# Patient Record
Sex: MALE | Race: WHITE | NOT HISPANIC OR LATINO
[De-identification: names, ages, dates, MRNs, and addresses within clinical notes are randomized per-mention and may not be internally consistent; named-entity substitution may affect disease eponyms.]

---

## 2017-04-28 ENCOUNTER — APPOINTMENT (OUTPATIENT)
Dept: CARDIOLOGY | Facility: CLINIC | Age: 67
End: 2017-04-28

## 2017-04-28 VITALS
SYSTOLIC BLOOD PRESSURE: 129 MMHG | HEART RATE: 55 BPM | WEIGHT: 165 LBS | OXYGEN SATURATION: 99 % | DIASTOLIC BLOOD PRESSURE: 72 MMHG | BODY MASS INDEX: 23.01 KG/M2

## 2017-04-28 DIAGNOSIS — J30.2 OTHER SEASONAL ALLERGIC RHINITIS: ICD-10-CM

## 2017-04-28 DIAGNOSIS — Z78.9 OTHER SPECIFIED HEALTH STATUS: ICD-10-CM

## 2017-05-08 ENCOUNTER — APPOINTMENT (OUTPATIENT)
Dept: CARDIOLOGY | Facility: CLINIC | Age: 67
End: 2017-05-08

## 2017-05-23 ENCOUNTER — APPOINTMENT (OUTPATIENT)
Dept: CARDIOLOGY | Facility: CLINIC | Age: 67
End: 2017-05-23

## 2018-02-05 ENCOUNTER — APPOINTMENT (OUTPATIENT)
Dept: CARDIOLOGY | Facility: CLINIC | Age: 68
End: 2018-02-05
Payer: COMMERCIAL

## 2018-02-05 ENCOUNTER — NON-APPOINTMENT (OUTPATIENT)
Age: 68
End: 2018-02-05

## 2018-02-05 VITALS
HEART RATE: 61 BPM | BODY MASS INDEX: 23.66 KG/M2 | WEIGHT: 169 LBS | SYSTOLIC BLOOD PRESSURE: 144 MMHG | OXYGEN SATURATION: 91 % | DIASTOLIC BLOOD PRESSURE: 76 MMHG | HEIGHT: 71 IN

## 2018-02-05 VITALS — SYSTOLIC BLOOD PRESSURE: 104 MMHG | DIASTOLIC BLOOD PRESSURE: 60 MMHG

## 2018-02-05 PROCEDURE — 36415 COLL VENOUS BLD VENIPUNCTURE: CPT

## 2018-02-05 PROCEDURE — 93000 ELECTROCARDIOGRAM COMPLETE: CPT

## 2018-02-05 PROCEDURE — 99214 OFFICE O/P EST MOD 30 MIN: CPT

## 2018-02-05 RX ORDER — MESALAMINE 4 G/60ML
4 ENEMA RECTAL
Qty: 1 | Refills: 0 | Status: DISCONTINUED | COMMUNITY
Start: 2017-04-28 | End: 2018-02-05

## 2018-02-07 LAB
25(OH)D3 SERPL-MCNC: 68.2 NG/ML
ALBUMIN SERPL ELPH-MCNC: 4.2 G/DL
ALP BLD-CCNC: 97 U/L
ALT SERPL-CCNC: 17 U/L
ANION GAP SERPL CALC-SCNC: 14 MMOL/L
AST SERPL-CCNC: 24 U/L
BASOPHILS # BLD AUTO: 0.03 K/UL
BASOPHILS NFR BLD AUTO: 0.6 %
BILIRUB SERPL-MCNC: 0.2 MG/DL
BUN SERPL-MCNC: 26 MG/DL
CALCIUM SERPL-MCNC: 9.4 MG/DL
CHLORIDE SERPL-SCNC: 103 MMOL/L
CHOLEST SERPL-MCNC: 172 MG/DL
CHOLEST/HDLC SERPL: 4 RATIO
CO2 SERPL-SCNC: 24 MMOL/L
CREAT SERPL-MCNC: 1.35 MG/DL
EOSINOPHIL # BLD AUTO: 0.16 K/UL
EOSINOPHIL NFR BLD AUTO: 3.1 %
GLUCOSE SERPL-MCNC: 59 MG/DL
HBA1C MFR BLD HPLC: 5.4 %
HCT VFR BLD CALC: 45.1 %
HDLC SERPL-MCNC: 43 MG/DL
HGB BLD-MCNC: 14.6 G/DL
IMM GRANULOCYTES NFR BLD AUTO: 0 %
LDLC SERPL CALC-MCNC: 87 MG/DL
LYMPHOCYTES # BLD AUTO: 1.66 K/UL
LYMPHOCYTES NFR BLD AUTO: 31.7 %
MAN DIFF?: NORMAL
MCHC RBC-ENTMCNC: 32.4 GM/DL
MCHC RBC-ENTMCNC: 33.6 PG
MCV RBC AUTO: 103.7 FL
MONOCYTES # BLD AUTO: 0.43 K/UL
MONOCYTES NFR BLD AUTO: 8.2 %
NEUTROPHILS # BLD AUTO: 2.95 K/UL
NEUTROPHILS NFR BLD AUTO: 56.4 %
PLATELET # BLD AUTO: 163 K/UL
POTASSIUM SERPL-SCNC: 5.7 MMOL/L
PROT SERPL-MCNC: 6.8 G/DL
RBC # BLD: 4.35 M/UL
RBC # FLD: 13.6 %
SODIUM SERPL-SCNC: 141 MMOL/L
TRIGL SERPL-MCNC: 210 MG/DL
TSH SERPL-ACNC: 2.34 UIU/ML
WBC # FLD AUTO: 5.23 K/UL

## 2018-02-22 LAB
ALBUMIN SERPL ELPH-MCNC: 4 G/DL
ALP BLD-CCNC: 79 U/L
ALT SERPL-CCNC: 20 U/L
ANION GAP SERPL CALC-SCNC: 11 MMOL/L
AST SERPL-CCNC: 25 U/L
BILIRUB SERPL-MCNC: 0.6 MG/DL
BUN SERPL-MCNC: 27 MG/DL
CALCIUM SERPL-MCNC: 9.5 MG/DL
CHLORIDE SERPL-SCNC: 107 MMOL/L
CO2 SERPL-SCNC: 24 MMOL/L
CREAT SERPL-MCNC: 1.46 MG/DL
GLUCOSE SERPL-MCNC: 66 MG/DL
POTASSIUM SERPL-SCNC: 4.7 MMOL/L
PROT SERPL-MCNC: 6.5 G/DL
SODIUM SERPL-SCNC: 142 MMOL/L

## 2018-03-08 ENCOUNTER — NON-APPOINTMENT (OUTPATIENT)
Age: 68
End: 2018-03-08

## 2018-03-08 ENCOUNTER — APPOINTMENT (OUTPATIENT)
Dept: CARDIOLOGY | Facility: CLINIC | Age: 68
End: 2018-03-08
Payer: COMMERCIAL

## 2018-03-08 VITALS
TEMPERATURE: 97.5 F | OXYGEN SATURATION: 99 % | SYSTOLIC BLOOD PRESSURE: 105 MMHG | HEIGHT: 71 IN | HEART RATE: 53 BPM | BODY MASS INDEX: 22.4 KG/M2 | WEIGHT: 160 LBS | DIASTOLIC BLOOD PRESSURE: 62 MMHG

## 2018-03-08 DIAGNOSIS — Z01.810 ENCOUNTER FOR PREPROCEDURAL CARDIOVASCULAR EXAMINATION: ICD-10-CM

## 2018-03-08 PROCEDURE — 93000 ELECTROCARDIOGRAM COMPLETE: CPT

## 2018-03-08 PROCEDURE — 36415 COLL VENOUS BLD VENIPUNCTURE: CPT

## 2018-03-08 PROCEDURE — 99214 OFFICE O/P EST MOD 30 MIN: CPT

## 2018-03-12 LAB
ALBUMIN SERPL ELPH-MCNC: 4.4 G/DL
ALP BLD-CCNC: 87 U/L
ALT SERPL-CCNC: 16 U/L
ANION GAP SERPL CALC-SCNC: 16 MMOL/L
APPEARANCE: CLEAR
AST SERPL-CCNC: 26 U/L
BASOPHILS # BLD AUTO: 0.02 K/UL
BASOPHILS NFR BLD AUTO: 0.4 %
BILIRUB SERPL-MCNC: 0.6 MG/DL
BILIRUBIN URINE: NEGATIVE
BLOOD URINE: NEGATIVE
BUN SERPL-MCNC: 22 MG/DL
CALCIUM SERPL-MCNC: 9.5 MG/DL
CHLORIDE SERPL-SCNC: 106 MMOL/L
CO2 SERPL-SCNC: 17 MMOL/L
COLOR: YELLOW
CREAT SERPL-MCNC: 1.23 MG/DL
EOSINOPHIL # BLD AUTO: 0.08 K/UL
EOSINOPHIL NFR BLD AUTO: 1.6 %
GLUCOSE QUALITATIVE U: NEGATIVE MG/DL
GLUCOSE SERPL-MCNC: 98 MG/DL
HCT VFR BLD CALC: 43.7 %
HGB BLD-MCNC: 15.2 G/DL
IMM GRANULOCYTES NFR BLD AUTO: 0 %
KETONES URINE: NEGATIVE
LEUKOCYTE ESTERASE URINE: NEGATIVE
LYMPHOCYTES # BLD AUTO: 1.23 K/UL
LYMPHOCYTES NFR BLD AUTO: 24.2 %
MAN DIFF?: NORMAL
MCHC RBC-ENTMCNC: 34.4 PG
MCHC RBC-ENTMCNC: 34.8 GM/DL
MCV RBC AUTO: 98.9 FL
MONOCYTES # BLD AUTO: 0.43 K/UL
MONOCYTES NFR BLD AUTO: 8.4 %
NEUTROPHILS # BLD AUTO: 3.33 K/UL
NEUTROPHILS NFR BLD AUTO: 65.4 %
NITRITE URINE: NEGATIVE
PH URINE: 5
PLATELET # BLD AUTO: 187 K/UL
POTASSIUM SERPL-SCNC: 4.7 MMOL/L
PROT SERPL-MCNC: 6.8 G/DL
PROTEIN URINE: NEGATIVE MG/DL
PSA SERPL-MCNC: 5.98 NG/ML
RBC # BLD: 4.42 M/UL
RBC # FLD: 13.4 %
SODIUM SERPL-SCNC: 139 MMOL/L
SPECIFIC GRAVITY URINE: 1.01
UROBILINOGEN URINE: NEGATIVE MG/DL
WBC # FLD AUTO: 5.09 K/UL

## 2018-04-03 ENCOUNTER — TRANSCRIPTION ENCOUNTER (OUTPATIENT)
Age: 68
End: 2018-04-03

## 2018-04-17 ENCOUNTER — APPOINTMENT (OUTPATIENT)
Dept: UROLOGY | Facility: CLINIC | Age: 68
End: 2018-04-17
Payer: COMMERCIAL

## 2018-04-17 ENCOUNTER — RECORD ABSTRACTING (OUTPATIENT)
Age: 68
End: 2018-04-17

## 2018-04-17 VITALS
TEMPERATURE: 97.9 F | HEART RATE: 52 BPM | SYSTOLIC BLOOD PRESSURE: 114 MMHG | RESPIRATION RATE: 17 BRPM | BODY MASS INDEX: 22.4 KG/M2 | WEIGHT: 160 LBS | DIASTOLIC BLOOD PRESSURE: 73 MMHG | HEIGHT: 71 IN

## 2018-04-17 DIAGNOSIS — Z80.42 FAMILY HISTORY OF MALIGNANT NEOPLASM OF PROSTATE: ICD-10-CM

## 2018-04-17 PROCEDURE — 99204 OFFICE O/P NEW MOD 45 MIN: CPT

## 2018-04-25 ENCOUNTER — RESULT REVIEW (OUTPATIENT)
Age: 68
End: 2018-04-25

## 2018-05-02 ENCOUNTER — OUTPATIENT (OUTPATIENT)
Dept: OUTPATIENT SERVICES | Facility: HOSPITAL | Age: 68
LOS: 1 days | End: 2018-05-02

## 2018-05-02 VITALS
RESPIRATION RATE: 16 BRPM | DIASTOLIC BLOOD PRESSURE: 70 MMHG | TEMPERATURE: 97 F | HEIGHT: 70.5 IN | SYSTOLIC BLOOD PRESSURE: 114 MMHG | WEIGHT: 164.02 LBS | HEART RATE: 56 BPM

## 2018-05-02 DIAGNOSIS — E03.9 HYPOTHYROIDISM, UNSPECIFIED: ICD-10-CM

## 2018-05-02 DIAGNOSIS — C61 MALIGNANT NEOPLASM OF PROSTATE: ICD-10-CM

## 2018-05-02 DIAGNOSIS — Z01.818 ENCOUNTER FOR OTHER PREPROCEDURAL EXAMINATION: ICD-10-CM

## 2018-05-02 DIAGNOSIS — Z90.89 ACQUIRED ABSENCE OF OTHER ORGANS: Chronic | ICD-10-CM

## 2018-05-02 LAB
ALBUMIN SERPL ELPH-MCNC: 4 G/DL — SIGNIFICANT CHANGE UP (ref 3.3–5)
ALP SERPL-CCNC: 83 U/L — SIGNIFICANT CHANGE UP (ref 40–120)
ALT FLD-CCNC: 20 U/L — SIGNIFICANT CHANGE UP (ref 4–41)
APPEARANCE UR: CLEAR — SIGNIFICANT CHANGE UP
AST SERPL-CCNC: 22 U/L — SIGNIFICANT CHANGE UP (ref 4–40)
BILIRUB SERPL-MCNC: 0.6 MG/DL — SIGNIFICANT CHANGE UP (ref 0.2–1.2)
BILIRUB UR-MCNC: NEGATIVE — SIGNIFICANT CHANGE UP
BLD GP AB SCN SERPL QL: NEGATIVE — SIGNIFICANT CHANGE UP
BLOOD UR QL VISUAL: NEGATIVE — SIGNIFICANT CHANGE UP
BUN SERPL-MCNC: 21 MG/DL — SIGNIFICANT CHANGE UP (ref 7–23)
CALCIUM SERPL-MCNC: 8.9 MG/DL — SIGNIFICANT CHANGE UP (ref 8.4–10.5)
CHLORIDE SERPL-SCNC: 104 MMOL/L — SIGNIFICANT CHANGE UP (ref 98–107)
CO2 SERPL-SCNC: 25 MMOL/L — SIGNIFICANT CHANGE UP (ref 22–31)
COLOR SPEC: YELLOW — SIGNIFICANT CHANGE UP
CREAT SERPL-MCNC: 1.27 MG/DL — SIGNIFICANT CHANGE UP (ref 0.5–1.3)
GLUCOSE SERPL-MCNC: 67 MG/DL — LOW (ref 70–99)
GLUCOSE UR-MCNC: NEGATIVE — SIGNIFICANT CHANGE UP
HCT VFR BLD CALC: 46.5 % — SIGNIFICANT CHANGE UP (ref 39–50)
HGB BLD-MCNC: 15.6 G/DL — SIGNIFICANT CHANGE UP (ref 13–17)
KETONES UR-MCNC: NEGATIVE — SIGNIFICANT CHANGE UP
LEUKOCYTE ESTERASE UR-ACNC: NEGATIVE — SIGNIFICANT CHANGE UP
MCHC RBC-ENTMCNC: 33.5 % — SIGNIFICANT CHANGE UP (ref 32–36)
MCHC RBC-ENTMCNC: 33.8 PG — SIGNIFICANT CHANGE UP (ref 27–34)
MCV RBC AUTO: 100.9 FL — HIGH (ref 80–100)
NITRITE UR-MCNC: NEGATIVE — SIGNIFICANT CHANGE UP
NRBC # FLD: 0 — SIGNIFICANT CHANGE UP
PH UR: 7 — SIGNIFICANT CHANGE UP (ref 4.6–8)
PLATELET # BLD AUTO: 202 K/UL — SIGNIFICANT CHANGE UP (ref 150–400)
PMV BLD: 11.7 FL — SIGNIFICANT CHANGE UP (ref 7–13)
POTASSIUM SERPL-MCNC: 4.4 MMOL/L — SIGNIFICANT CHANGE UP (ref 3.5–5.3)
POTASSIUM SERPL-SCNC: 4.4 MMOL/L — SIGNIFICANT CHANGE UP (ref 3.5–5.3)
PROT SERPL-MCNC: 6.8 G/DL — SIGNIFICANT CHANGE UP (ref 6–8.3)
PROT UR-MCNC: 20 MG/DL — SIGNIFICANT CHANGE UP
RBC # BLD: 4.61 M/UL — SIGNIFICANT CHANGE UP (ref 4.2–5.8)
RBC # FLD: 12.4 % — SIGNIFICANT CHANGE UP (ref 10.3–14.5)
RBC CASTS # UR COMP ASSIST: SIGNIFICANT CHANGE UP (ref 0–?)
RH IG SCN BLD-IMP: POSITIVE — SIGNIFICANT CHANGE UP
SODIUM SERPL-SCNC: 140 MMOL/L — SIGNIFICANT CHANGE UP (ref 135–145)
SP GR SPEC: 1.02 — SIGNIFICANT CHANGE UP (ref 1–1.04)
UROBILINOGEN FLD QL: NORMAL MG/DL — SIGNIFICANT CHANGE UP
WBC # BLD: 4.28 K/UL — SIGNIFICANT CHANGE UP (ref 3.8–10.5)
WBC # FLD AUTO: 4.28 K/UL — SIGNIFICANT CHANGE UP (ref 3.8–10.5)
WBC UR QL: SIGNIFICANT CHANGE UP (ref 0–?)

## 2018-05-02 RX ORDER — SODIUM CHLORIDE 9 MG/ML
1000 INJECTION, SOLUTION INTRAVENOUS
Qty: 0 | Refills: 0 | Status: DISCONTINUED | OUTPATIENT
Start: 2018-05-14 | End: 2018-05-14

## 2018-05-02 NOTE — H&P PST ADULT - FAMILY HISTORY
Aunt  Still living? No  Family history of lung cancer, Age at diagnosis: Age Unknown  Family history of multiple myeloma, Age at diagnosis: Age Unknown  Family history of diabetes mellitus, Age at diagnosis: Age Unknown     Father  Still living? No  Family history of CHF (congestive heart failure), Age at diagnosis: Age Unknown

## 2018-05-02 NOTE — H&P PST ADULT - PMH
Hyperlipidemia  been on medication for >5 years  Hypothyroidism    IBS (irritable bowel syndrome)    Malignant neoplasm of prostate

## 2018-05-02 NOTE — H&P PST ADULT - NEGATIVE NEUROLOGICAL SYMPTOMS
no weakness/no generalized seizures/no vertigo/no loss of sensation/no transient paralysis/no paresthesias/no headache/no difficulty walking

## 2018-05-02 NOTE — H&P PST ADULT - MUSCULOSKELETAL
details… detailed exam normal strength/no joint warmth/no calf tenderness/no joint swelling/no joint erythema/ROM intact

## 2018-05-02 NOTE — H&P PST ADULT - NEGATIVE CARDIOVASCULAR SYMPTOMS
no orthopnea/no paroxysmal nocturnal dyspnea/no peripheral edema/no palpitations/no claudication/no chest pain/no dyspnea on exertion

## 2018-05-02 NOTE — H&P PST ADULT - NEGATIVE ENMT SYMPTOMS
no nasal congestion/no hearing difficulty/no vertigo/no sinus symptoms/no tinnitus/no ear pain/no dysphagia

## 2018-05-02 NOTE — H&P PST ADULT - HISTORY OF PRESENT ILLNESS
69 yo male with PMH hypothyroidism presents to pre surgical testing.  Pt reports his PSA was elevated last year and has had 2 prostate biopsies, last done 3/2018, positive for malignancy.  Pt reports MRI done 3/2018.  Pt is scheduled for robotic radical prostatectomy on 5/14/18.

## 2018-05-02 NOTE — H&P PST ADULT - NEGATIVE MUSCULOSKELETAL SYMPTOMS
no muscle cramps/no neck pain/no stiffness/no arm pain L/no leg pain L/no joint swelling/no myalgia/no muscle weakness/no leg pain R/no arthralgia/no back pain

## 2018-05-02 NOTE — H&P PST ADULT - PROBLEM SELECTOR PLAN 1
Pt is scheduled for robotic radical prostatectomy on 5/14/18.   Pre op instructions including chlorhexidine wash given and pt able to verbalize understanding.

## 2018-05-02 NOTE — H&P PST ADULT - LYMPHATIC
supraclavicular L/supraclavicular R/anterior cervical R/posterior cervical L/anterior cervical L/posterior cervical R

## 2018-05-02 NOTE — H&P PST ADULT - NSANTHOSAYNRD_GEN_A_CORE
No. NILDA screening performed.  STOP BANG Legend: 0-2 = LOW Risk; 3-4 = INTERMEDIATE Risk; 5-8 = HIGH Risk

## 2018-05-03 LAB — SPECIMEN SOURCE: SIGNIFICANT CHANGE UP

## 2018-05-04 LAB — BACTERIA UR CULT: SIGNIFICANT CHANGE UP

## 2018-05-13 ENCOUNTER — TRANSCRIPTION ENCOUNTER (OUTPATIENT)
Age: 68
End: 2018-05-13

## 2018-05-14 ENCOUNTER — INPATIENT (INPATIENT)
Facility: HOSPITAL | Age: 68
LOS: 1 days | Discharge: HOME | End: 2018-05-16
Attending: UROLOGY | Admitting: UROLOGY
Payer: COMMERCIAL

## 2018-05-14 ENCOUNTER — APPOINTMENT (OUTPATIENT)
Dept: UROLOGY | Facility: HOSPITAL | Age: 68
End: 2018-05-14

## 2018-05-14 ENCOUNTER — RESULT REVIEW (OUTPATIENT)
Age: 68
End: 2018-05-14

## 2018-05-14 VITALS
OXYGEN SATURATION: 100 % | SYSTOLIC BLOOD PRESSURE: 110 MMHG | HEART RATE: 50 BPM | RESPIRATION RATE: 15 BRPM | DIASTOLIC BLOOD PRESSURE: 67 MMHG | HEIGHT: 70.5 IN | WEIGHT: 164.02 LBS | TEMPERATURE: 97 F

## 2018-05-14 DIAGNOSIS — C61 MALIGNANT NEOPLASM OF PROSTATE: ICD-10-CM

## 2018-05-14 DIAGNOSIS — Z29.9 ENCOUNTER FOR PROPHYLACTIC MEASURES, UNSPECIFIED: ICD-10-CM

## 2018-05-14 DIAGNOSIS — Z90.89 ACQUIRED ABSENCE OF OTHER ORGANS: Chronic | ICD-10-CM

## 2018-05-14 LAB — RH IG SCN BLD-IMP: POSITIVE — SIGNIFICANT CHANGE UP

## 2018-05-14 PROCEDURE — 99223 1ST HOSP IP/OBS HIGH 75: CPT

## 2018-05-14 PROCEDURE — 38571 LAPAROSCOPY LYMPHADENECTOMY: CPT

## 2018-05-14 PROCEDURE — 55866 LAPS SURG PRST8ECT RPBIC RAD: CPT

## 2018-05-14 PROCEDURE — 88307 TISSUE EXAM BY PATHOLOGIST: CPT | Mod: 26

## 2018-05-14 PROCEDURE — 88309 TISSUE EXAM BY PATHOLOGIST: CPT | Mod: 26

## 2018-05-14 RX ORDER — OXYCODONE AND ACETAMINOPHEN 5; 325 MG/1; MG/1
1 TABLET ORAL EVERY 4 HOURS
Qty: 0 | Refills: 0 | Status: DISCONTINUED | OUTPATIENT
Start: 2018-05-14 | End: 2018-05-14

## 2018-05-14 RX ORDER — FLUTICASONE PROPIONATE 50 MCG
1 SPRAY, SUSPENSION NASAL
Qty: 0 | Refills: 0 | Status: DISCONTINUED | OUTPATIENT
Start: 2018-05-14 | End: 2018-05-16

## 2018-05-14 RX ORDER — ONDANSETRON 8 MG/1
4 TABLET, FILM COATED ORAL ONCE
Qty: 0 | Refills: 0 | Status: COMPLETED | OUTPATIENT
Start: 2018-05-14 | End: 2018-05-14

## 2018-05-14 RX ORDER — MORPHINE SULFATE 50 MG/1
4 CAPSULE, EXTENDED RELEASE ORAL
Qty: 0 | Refills: 0 | Status: DISCONTINUED | OUTPATIENT
Start: 2018-05-14 | End: 2018-05-14

## 2018-05-14 RX ORDER — HYDROMORPHONE HYDROCHLORIDE 2 MG/ML
0.5 INJECTION INTRAMUSCULAR; INTRAVENOUS; SUBCUTANEOUS ONCE
Qty: 0 | Refills: 0 | Status: DISCONTINUED | OUTPATIENT
Start: 2018-05-14 | End: 2018-05-14

## 2018-05-14 RX ORDER — SODIUM CHLORIDE 9 MG/ML
1000 INJECTION, SOLUTION INTRAVENOUS
Qty: 0 | Refills: 0 | Status: DISCONTINUED | OUTPATIENT
Start: 2018-05-14 | End: 2018-05-15

## 2018-05-14 RX ORDER — TETRAHYDROZOLINE/POLYETHYL GLY 0.05 %-1 %
1 DROPS OPHTHALMIC (EYE)
Qty: 0 | Refills: 0 | Status: DISCONTINUED | OUTPATIENT
Start: 2018-05-14 | End: 2018-05-16

## 2018-05-14 RX ORDER — SODIUM CHLORIDE 9 MG/ML
500 INJECTION INTRAMUSCULAR; INTRAVENOUS; SUBCUTANEOUS ONCE
Qty: 0 | Refills: 0 | Status: COMPLETED | OUTPATIENT
Start: 2018-05-14 | End: 2018-05-14

## 2018-05-14 RX ORDER — ACETAMINOPHEN 500 MG
650 TABLET ORAL EVERY 6 HOURS
Qty: 0 | Refills: 0 | Status: DISCONTINUED | OUTPATIENT
Start: 2018-05-14 | End: 2018-05-16

## 2018-05-14 RX ORDER — OXYCODONE HYDROCHLORIDE 5 MG/1
10 TABLET ORAL EVERY 4 HOURS
Qty: 0 | Refills: 0 | Status: DISCONTINUED | OUTPATIENT
Start: 2018-05-14 | End: 2018-05-16

## 2018-05-14 RX ORDER — OXYCODONE AND ACETAMINOPHEN 5; 325 MG/1; MG/1
2 TABLET ORAL EVERY 4 HOURS
Qty: 0 | Refills: 0 | Status: DISCONTINUED | OUTPATIENT
Start: 2018-05-14 | End: 2018-05-14

## 2018-05-14 RX ORDER — SENNA PLUS 8.6 MG/1
2 TABLET ORAL AT BEDTIME
Qty: 0 | Refills: 0 | Status: DISCONTINUED | OUTPATIENT
Start: 2018-05-14 | End: 2018-05-16

## 2018-05-14 RX ORDER — ONDANSETRON 8 MG/1
4 TABLET, FILM COATED ORAL EVERY 6 HOURS
Qty: 0 | Refills: 0 | Status: DISCONTINUED | OUTPATIENT
Start: 2018-05-14 | End: 2018-05-16

## 2018-05-14 RX ORDER — HEPARIN SODIUM 5000 [USP'U]/ML
5000 INJECTION INTRAVENOUS; SUBCUTANEOUS ONCE
Qty: 0 | Refills: 0 | Status: COMPLETED | OUTPATIENT
Start: 2018-05-14 | End: 2018-05-14

## 2018-05-14 RX ORDER — DOCUSATE SODIUM 100 MG
100 CAPSULE ORAL THREE TIMES A DAY
Qty: 0 | Refills: 0 | Status: DISCONTINUED | OUTPATIENT
Start: 2018-05-14 | End: 2018-05-16

## 2018-05-14 RX ORDER — OXYBUTYNIN CHLORIDE 5 MG
5 TABLET ORAL THREE TIMES A DAY
Qty: 0 | Refills: 0 | Status: DISCONTINUED | OUTPATIENT
Start: 2018-05-14 | End: 2018-05-16

## 2018-05-14 RX ORDER — LIDOCAINE 4 G/100G
1 CREAM TOPICAL
Qty: 0 | Refills: 0 | Status: DISCONTINUED | OUTPATIENT
Start: 2018-05-14 | End: 2018-05-16

## 2018-05-14 RX ORDER — THYROID 120 MG
45 TABLET ORAL DAILY
Qty: 0 | Refills: 0 | Status: DISCONTINUED | OUTPATIENT
Start: 2018-05-14 | End: 2018-05-16

## 2018-05-14 RX ORDER — METOCLOPRAMIDE HCL 10 MG
10 TABLET ORAL ONCE
Qty: 0 | Refills: 0 | Status: COMPLETED | OUTPATIENT
Start: 2018-05-14 | End: 2018-05-14

## 2018-05-14 RX ORDER — OXYCODONE HYDROCHLORIDE 5 MG/1
5 TABLET ORAL EVERY 4 HOURS
Qty: 0 | Refills: 0 | Status: DISCONTINUED | OUTPATIENT
Start: 2018-05-14 | End: 2018-05-16

## 2018-05-14 RX ORDER — BENZOCAINE AND MENTHOL 5; 1 G/100ML; G/100ML
1 LIQUID ORAL
Qty: 0 | Refills: 0 | Status: DISCONTINUED | OUTPATIENT
Start: 2018-05-14 | End: 2018-05-16

## 2018-05-14 RX ORDER — HEPARIN SODIUM 5000 [USP'U]/ML
5000 INJECTION INTRAVENOUS; SUBCUTANEOUS EVERY 12 HOURS
Qty: 0 | Refills: 0 | Status: DISCONTINUED | OUTPATIENT
Start: 2018-05-14 | End: 2018-05-16

## 2018-05-14 RX ADMIN — SODIUM CHLORIDE 125 MILLILITER(S): 9 INJECTION, SOLUTION INTRAVENOUS at 12:26

## 2018-05-14 RX ADMIN — HYDROMORPHONE HYDROCHLORIDE 0.5 MILLIGRAM(S): 2 INJECTION INTRAMUSCULAR; INTRAVENOUS; SUBCUTANEOUS at 13:15

## 2018-05-14 RX ADMIN — MORPHINE SULFATE 4 MILLIGRAM(S): 50 CAPSULE, EXTENDED RELEASE ORAL at 13:05

## 2018-05-14 RX ADMIN — SODIUM CHLORIDE 3000 MILLILITER(S): 9 INJECTION INTRAMUSCULAR; INTRAVENOUS; SUBCUTANEOUS at 13:15

## 2018-05-14 RX ADMIN — SODIUM CHLORIDE 30 MILLILITER(S): 9 INJECTION, SOLUTION INTRAVENOUS at 07:41

## 2018-05-14 RX ADMIN — HEPARIN SODIUM 5000 UNIT(S): 5000 INJECTION INTRAVENOUS; SUBCUTANEOUS at 07:41

## 2018-05-14 RX ADMIN — SENNA PLUS 2 TABLET(S): 8.6 TABLET ORAL at 21:24

## 2018-05-14 RX ADMIN — OXYCODONE AND ACETAMINOPHEN 1 TABLET(S): 5; 325 TABLET ORAL at 15:03

## 2018-05-14 RX ADMIN — OXYCODONE AND ACETAMINOPHEN 1 TABLET(S): 5; 325 TABLET ORAL at 15:45

## 2018-05-14 RX ADMIN — OXYCODONE HYDROCHLORIDE 10 MILLIGRAM(S): 5 TABLET ORAL at 18:30

## 2018-05-14 RX ADMIN — BENZOCAINE AND MENTHOL 1 LOZENGE: 5; 1 LIQUID ORAL at 17:26

## 2018-05-14 RX ADMIN — MORPHINE SULFATE 4 MILLIGRAM(S): 50 CAPSULE, EXTENDED RELEASE ORAL at 12:23

## 2018-05-14 RX ADMIN — OXYCODONE HYDROCHLORIDE 10 MILLIGRAM(S): 5 TABLET ORAL at 17:33

## 2018-05-14 RX ADMIN — MORPHINE SULFATE 4 MILLIGRAM(S): 50 CAPSULE, EXTENDED RELEASE ORAL at 12:46

## 2018-05-14 RX ADMIN — HEPARIN SODIUM 5000 UNIT(S): 5000 INJECTION INTRAVENOUS; SUBCUTANEOUS at 17:27

## 2018-05-14 RX ADMIN — Medication 10 MILLIGRAM(S): at 19:51

## 2018-05-14 RX ADMIN — ONDANSETRON 4 MILLIGRAM(S): 8 TABLET, FILM COATED ORAL at 11:53

## 2018-05-14 RX ADMIN — HYDROMORPHONE HYDROCHLORIDE 0.5 MILLIGRAM(S): 2 INJECTION INTRAMUSCULAR; INTRAVENOUS; SUBCUTANEOUS at 13:30

## 2018-05-14 RX ADMIN — Medication 100 MILLIGRAM(S): at 21:23

## 2018-05-14 RX ADMIN — ONDANSETRON 4 MILLIGRAM(S): 8 TABLET, FILM COATED ORAL at 16:27

## 2018-05-14 RX ADMIN — MORPHINE SULFATE 4 MILLIGRAM(S): 50 CAPSULE, EXTENDED RELEASE ORAL at 12:49

## 2018-05-14 NOTE — ASU PREOP CHECKLIST - ADVANCE DIRECTIVE ADDRESSED/READDRESSED
Endocrinology Clinic Progress Note  PCP: Daysi Nguyen M.D.    CC:  Hypercalcemia    HPI:  Michael Mixon is a 59 y.o. old patient who comes in today for routine follow up. Recent lab work is consistent with diagnoses of primary hyperparathyroidism. His serum calcium level is more than 1 mg/dL above upper normal. He is taking small dose of supplemental calcium and vitamin D. No history of kidney stones or fragility fractures.    ROS:  Constitutional: No unintentional weight loss    Past Medical History:  Patient Active Problem List    Diagnosis Date Noted   • Preventative health care 08/19/2016   • Need for prophylactic vaccination with combined vaccine 08/19/2016   • Hypertriglyceridemia 03/28/2014   • History of prostate cancer 03/28/2014   • GERD (gastroesophageal reflux disease) 03/28/2014   • Vitamin D deficiency 03/28/2014       Medications:    Current outpatient prescriptions:   •  tamsulosin (FLOMAX) 0.4 MG capsule, Take 0.4 mg by mouth ONE-HALF HOUR AFTER BREAKFAST., Disp: , Rfl:   •  omeprazole (PRILOSEC) 20 MG delayed-release capsule, Take 1 Cap by mouth every day. 1-2 daily prn acid reflux., Disp: 90 Cap, Rfl: 3  •  NON SPECIFIED, Vit D 50,000 IU # 8  Sig: Take once week x 8 weeks., Disp: 8 Tab, Rfl: 0  •  clobetasol (TEMOVATE) 0.05 % CREA, Apply  to affected area(s) 2 times a day. Apply bid to effected area., Disp: 45 g, Rfl: 1  •  gemfibrozil (LOPID) 600 MG TABS, Take 1 Tab by mouth 2 times a day. 1/2 ac., Disp: 180 Each, Rfl: 3  •  mupirocin (BACTROBAN) 2 % OINT, Apply to effected area bid prn., Disp: 1 Tube, Rfl: 2  •  fexofenadine-pseudoephedrine (ALLEGRA-D)  MG per tablet, Take 1 Tab by mouth. One q 12 hours prn ear or nasal congestion., Disp: 60 Tab, Rfl: 3    Labs:  Results for MICHAEL MIXON (MRN [id number]) as of 3/10/2017 08:29   Ref. Range 8/19/2016 12:02 9/2/2016 08:04 2/25/2017 11:46 2/25/2017 11:52 2/27/2017 15:54   Sodium Latest Ref Range: 135-145 mmol/L 139  138      Potassium Latest Ref Range: 3.6-5.5 mmol/L 4.0  3.9     Chloride Latest Ref Range:  mmol/L 106  102     Co2 Latest Ref Range: 20-33 mmol/L 26  31     Anion Gap Latest Ref Range: 0.0-11.9  7.0  5.0     Glucose Latest Ref Range: 65-99 mg/dL 87  93     Bun Latest Ref Range: 8-22 mg/dL 15  15     Creatinine Latest Ref Range: 0.50-1.40 mg/dL 0.90  1.06     GFR If  Latest Ref Range: >60 mL/min/1.73 m 2 >60  >60     GFR If Non  Latest Ref Range: >60 mL/min/1.73 m 2 >60  >60     Calcium Latest Ref Range: 8.5-10.5 mg/dL 11.1 (H) 11.2 (H) 11.6 (H)     AST(SGOT) Latest Ref Range: 12-45 U/L 22  16     ALT(SGPT) Latest Ref Range: 2-50 U/L 30  27     Alkaline Phosphatase Latest Ref Range: 30-99 U/L 86  91     Total Bilirubin Latest Ref Range: 0.1-1.5 mg/dL 0.9  0.6     Albumin Latest Ref Range: 3.2-4.9 g/dL 4.5  4.6     Total Protein Latest Ref Range: 6.0-8.2 g/dL 7.2  7.8     Globulin Latest Ref Range: 1.9-3.5 g/dL 2.7  3.2     A-G Ratio Latest Units: g/dL 1.7  1.4     Phosphorus Latest Ref Range: 2.5-4.5 mg/dL   2.9     Ionized Calcium Latest Ref Range: 1.1-1.3 mmol/L     1.5 (H)   Glycohemoglobin Latest Ref Range: 0.0-5.6 % 5.1       Estim. Avg Glu Latest Units: mg/dL 100       Cholesterol,Tot Latest Ref Range: 100-199 mg/dL 157       Triglycerides Latest Ref Range: 0-149 mg/dL 176 (H)       HDL Latest Ref Range: >=40 mg/dL 31 (A)       LDL Latest Ref Range: <100 mg/dL 91       Creatinine, Random Urine Latest Ref Range: 800-2100 mg/d    1512    Creatinine, Urine Latest Units: mg/dL    88.40    Total Volume, Urine Latest Units: mL    2100    Creatinine 24 Hr Urine Latest Ref Range: 9012-8851 mg/24 Hr    1856    Total Volume Latest Units: mL    2100    Calcium Random Urine Latest Units: mg/dL    14.1    Calcium Urine Latest Units: mg/d    296    Calcium Creat Ratio   13156 Latest Ref Range:  mg/g    196    Creatinine Urine Latest Units: mg/dL    72      Physical Examination:  Vital  "signs: /82 mmHg  Pulse 80  Ht 1.746 m (5' 8.75\")  Wt 79.017 kg (174 lb 3.2 oz)  BMI 25.92 kg/m2  SpO2 96%  General: No apparent distress, cooperative  Eyes: No scleral icterus, no discharge  Resp: Normal effort, clear to auscultation bilaterally  CVS: Regular rate and rhythm, S1 S2 normal, no murmur  Extremities: No lower extremity edema  Psych: Alert and oriented, normal mood and affect    Assessment and Plan:    1. Hypercalcemia due to Primary hyperparathyroidism (CMS-HCC)  · We discussed that serum calcium 1 mg/dL above upper normal is an indication for parathyroidectomy, he agrees with this plan  · We will obtain parathyroid scan  · We will refer to surgery for parathyroid resection  · Advised to resume 800-1000 mg calcium per day and 800 international units vitamin D per day  · Advised to stay well hydrated  - NM-PARATHYROID (SESTAMIBI) SCAN; Future  - REFERRAL TO GENERAL SURGERY    Return in about 6 months (around 9/10/2017).    Thank you for allowing me to participate in the care of this patient.    Carlos Slater M.D.  03/10/2017    CC:   Daysi Nguyen M.D.    This note was created using voice recognition software (Dragon). The accuracy of the dictation is limited by the abilities of the software. I have reviewed the note prior to signing, however some errors in grammar and context are still possible. If you have any questions related to this note please do not hesitate to contact our office.     " done

## 2018-05-14 NOTE — CONSULT NOTE ADULT - PROBLEM SELECTOR RECOMMENDATION 9
S/P OR.  Management as per   DVT prophylaxis  Pain control  Incentive spirometer  OOB and ambulation  Bowel regimen

## 2018-05-14 NOTE — PROGRESS NOTE ADULT - SUBJECTIVE AND OBJECTIVE BOX
Post op check    This 67 yo M is s/p RALP  PMH: IBS; chol; hypothyroid    Pt is awake and alert  Has no c/o    Afeb 124/64 64 98%RA    Abd- soft; appropriately tender             wounds C&D  Way 140 clear  NAWAF 15

## 2018-05-14 NOTE — CONSULT NOTE ADULT - ASSESSMENT
68 y.o. M with h/o hypothyroidism, IBS, HLD prostate ca, admitted for Robotic Radical prostatectomy. S/P OR, doing well

## 2018-05-14 NOTE — BRIEF OPERATIVE NOTE - PROCEDURE
<<-----Click on this checkbox to enter Procedure Robot-assisted laparoscopic radical prostatectomy  05/14/2018    Active  SGURRAM

## 2018-05-14 NOTE — CONSULT NOTE ADULT - SUBJECTIVE AND OBJECTIVE BOX
Patient is a 68y old  Male who presents with a chief complaint of "prostate removal" (02 May 2018 08:38)      HPI:  69 yo male with PMH hypothyroidism presents for Robotic radical prostatectomy.  S/P OR. Only c/o mild pain of suprapubic area and sore throat. Denies any CP or SOB    History limited due to: [ ] Dementia  [ ] Delirium  [ ] Condition    Pain Symptoms if applicable:             	                      	   mild          Pain:	                    	    1-3	     Location:	Abd  Modifying factors:	  Associated symptoms:	None    Function: [x] Independent  [ ] Assistance  [ ] Total care  [ ] Non-ambulatory    Allergies    Allergy Status Unknown    Intolerances    Gluten (Stomach Upset)      HOME MEDICATIONS: [ x] Reviewed    MEDICATIONS  (STANDING):  docusate sodium 100 milliGRAM(s) Oral three times a day  heparin  Injectable 5000 Unit(s) SubCutaneous every 12 hours  lactated ringers. 1000 milliLiter(s) (125 mL/Hr) IV Continuous <Continuous>  senna 2 Tablet(s) Oral at bedtime  thyroid 45 milliGRAM(s) Oral daily    MEDICATIONS  (PRN):  benzocaine 15 mG/menthol 3.6 mG Lozenge 1 Lozenge Oral four times a day PRN Sore Throat  fluticasone propionate 50 MICROgram(s)/spray Nasal Spray 1 Spray(s) Both Nostrils <User Schedule> PRN rhinorrhea  lidocaine 5% Ointment 1 Application(s) Topical every 3 hours PRN urethral pain  ondansetron Injectable 4 milliGRAM(s) IV Push every 6 hours PRN Nausea and/or Vomiting  oxybutynin 5 milliGRAM(s) Oral three times a day PRN Bladder spasms  oxyCODONE    5 mG/acetaminophen 325 mG 1 Tablet(s) Oral every 4 hours PRN Mild Pain (1 - 3)  oxyCODONE    5 mG/acetaminophen 325 mG 2 Tablet(s) Oral every 4 hours PRN Moderate Pain (4 - 6)  tetrahydrazoline Solution 1 Drop(s) Both EYES four times a day PRN dry eyes      PAST MEDICAL & SURGICAL HISTORY:  Hyperlipidemia: been on medication for &gt;5 years  Malignant neoplasm of prostate  IBS (irritable bowel syndrome)  Hypothyroidism  S/P tonsillectomy: age 5  [x ] Reviewed     SOCIAL HISTORY:  Residence: [ ] care home  [ ] SNF  [x ] Community  [ ] Substance abuse: denies  [ ] Tobacco: denies  [ ] Alcohol use: denies    FAMILY HISTORY:  Family history of diabetes mellitus (Aunt)  Family history of CHF (congestive heart failure) (Father)  Family history of multiple myeloma (Aunt)  Family history of lung cancer (Aunt)  [ ] No pertinent family history in first degree relatives     REVIEW OF SYSTEMS:    CONSTITUTIONAL: No fever, weight loss, or fatigue  EYES: No eye pain, visual disturbances, or discharge  ENMT:  No difficulty hearing, tinnitus, vertigo; No sinus or throat pain  NECK: No pain or stiffness  BREASTS: No pain, masses, or nipple discharge  RESPIRATORY: No cough, wheezing, chills or hemoptysis; No shortness of breath  CARDIOVASCULAR: No chest pain, palpitations, dizziness, or leg swelling  GASTROINTESTINAL: (+) abdominal  pain. No nausea, vomiting, or hematemesis.  GENITOURINARY: No dysuria, frequency, hematuria, or incontinence, (+) rosas  NEUROLOGICAL: No headaches, memory loss, loss of strength, numbness, or tremors  SKIN: No itching, burning, rashes, or lesions   LYMPH NODES: No enlarged glands  ENDOCRINE: No heat or cold intolerance; No hair loss  MUSCULOSKELETAL: No muscle or back pain  PSYCHIATRIC: No depression, anxiety, mood swings, or difficulty sleeping  HEME/LYMPH: No easy bruising, or bleeding gums  ALLERGY AND IMMUNOLOGIC: No hives or eczema    [  ] All other ROS negative  [  ] Unable to obtain due to poor mental status    Vital Signs Last 24 Hrs  T(C): 36.7 (14 May 2018 14:54), Max: 36.7 (14 May 2018 14:54)  T(F): 98.1 (14 May 2018 14:54), Max: 98.1 (14 May 2018 14:54)  HR: 64 (14 May 2018 14:54) (50 - 81)  BP: 124/64 (14 May 2018 14:54) (106/81 - 130/70)  BP(mean): --  RR: 17 (14 May 2018 14:54) (10 - 17)  SpO2: 98% (14 May 2018 14:54) (93% - 100%)    PHYSICAL EXAM:    GENERAL: NAD, well-groomed, well-developed  HEAD:  Atraumatic, Normocephalic  EYES: EOMI, PERRLA, conjunctiva and sclera clear  ENMT: Moist mucous membranes  NECK: Supple, No JVD  RESPIRATORY: Clear to auscultation bilaterally; No rales, rhonchi, wheezing, or rubs  CARDIOVASCULAR: Regular rate and rhythm; (+) 2/6 systolic murmurs at LSB, No rubs, or gallops  GASTROINTESTINAL: Soft, Nontender, Nondistended; Bowel sounds present  GENITOURINARY: (+) rosas  EXTREMITIES:  2+ Peripheral Pulses, No clubbing, cyanosis, or edema  NERVOUS SYSTEM:  Alert & Oriented X3; Moving all 4 extremities; No gross sensory deficits  HEME/LYMPH: No lymphadenopathy noted  SKIN: No rashes or lesions; Incisions C/D/I    LABS: pre -surgical lab reviewed              CAPILLARY BLOOD GLUCOSE          RADIOLOGY & ADDITIONAL STUDIES:    EKG:   Personally Reviewed:  [ ] YES     Imaging:   Personally Reviewed:  [ ] YES               Consultant(s) notes reviewed:    Care Discussed with Consultant(s)/Other Providers:

## 2018-05-15 LAB
BASOPHILS # BLD AUTO: 0.01 K/UL — SIGNIFICANT CHANGE UP (ref 0–0.2)
BASOPHILS NFR BLD AUTO: 0.1 % — SIGNIFICANT CHANGE UP (ref 0–2)
BUN SERPL-MCNC: 16 MG/DL — SIGNIFICANT CHANGE UP (ref 7–23)
CALCIUM SERPL-MCNC: 8.4 MG/DL — SIGNIFICANT CHANGE UP (ref 8.4–10.5)
CHLORIDE SERPL-SCNC: 105 MMOL/L — SIGNIFICANT CHANGE UP (ref 98–107)
CO2 SERPL-SCNC: 24 MMOL/L — SIGNIFICANT CHANGE UP (ref 22–31)
CREAT SERPL-MCNC: 1.24 MG/DL — SIGNIFICANT CHANGE UP (ref 0.5–1.3)
EOSINOPHIL # BLD AUTO: 0.01 K/UL — SIGNIFICANT CHANGE UP (ref 0–0.5)
EOSINOPHIL NFR BLD AUTO: 0.1 % — SIGNIFICANT CHANGE UP (ref 0–6)
GLUCOSE SERPL-MCNC: 135 MG/DL — HIGH (ref 70–99)
HCT VFR BLD CALC: 38.6 % — LOW (ref 39–50)
HGB BLD-MCNC: 12.8 G/DL — LOW (ref 13–17)
IMM GRANULOCYTES # BLD AUTO: 0.02 # — SIGNIFICANT CHANGE UP
IMM GRANULOCYTES NFR BLD AUTO: 0.3 % — SIGNIFICANT CHANGE UP (ref 0–1.5)
LYMPHOCYTES # BLD AUTO: 1.3 K/UL — SIGNIFICANT CHANGE UP (ref 1–3.3)
LYMPHOCYTES # BLD AUTO: 16.8 % — SIGNIFICANT CHANGE UP (ref 13–44)
MCHC RBC-ENTMCNC: 33.2 % — SIGNIFICANT CHANGE UP (ref 32–36)
MCHC RBC-ENTMCNC: 33.2 PG — SIGNIFICANT CHANGE UP (ref 27–34)
MCV RBC AUTO: 100 FL — SIGNIFICANT CHANGE UP (ref 80–100)
MONOCYTES # BLD AUTO: 1.05 K/UL — HIGH (ref 0–0.9)
MONOCYTES NFR BLD AUTO: 13.5 % — SIGNIFICANT CHANGE UP (ref 2–14)
NEUTROPHILS # BLD AUTO: 5.37 K/UL — SIGNIFICANT CHANGE UP (ref 1.8–7.4)
NEUTROPHILS NFR BLD AUTO: 69.2 % — SIGNIFICANT CHANGE UP (ref 43–77)
NRBC # FLD: 0 — SIGNIFICANT CHANGE UP
PLATELET # BLD AUTO: 154 K/UL — SIGNIFICANT CHANGE UP (ref 150–400)
PMV BLD: 11.5 FL — SIGNIFICANT CHANGE UP (ref 7–13)
POTASSIUM SERPL-MCNC: 4.5 MMOL/L — SIGNIFICANT CHANGE UP (ref 3.5–5.3)
POTASSIUM SERPL-SCNC: 4.5 MMOL/L — SIGNIFICANT CHANGE UP (ref 3.5–5.3)
RBC # BLD: 3.86 M/UL — LOW (ref 4.2–5.8)
RBC # FLD: 12.7 % — SIGNIFICANT CHANGE UP (ref 10.3–14.5)
SODIUM SERPL-SCNC: 140 MMOL/L — SIGNIFICANT CHANGE UP (ref 135–145)
WBC # BLD: 7.76 K/UL — SIGNIFICANT CHANGE UP (ref 3.8–10.5)
WBC # FLD AUTO: 7.76 K/UL — SIGNIFICANT CHANGE UP (ref 3.8–10.5)

## 2018-05-15 PROCEDURE — 99233 SBSQ HOSP IP/OBS HIGH 50: CPT

## 2018-05-15 RX ORDER — SODIUM CHLORIDE 9 MG/ML
1000 INJECTION, SOLUTION INTRAVENOUS
Qty: 0 | Refills: 0 | Status: DISCONTINUED | OUTPATIENT
Start: 2018-05-15 | End: 2018-05-16

## 2018-05-15 RX ORDER — METOCLOPRAMIDE HCL 10 MG
10 TABLET ORAL EVERY 6 HOURS
Qty: 0 | Refills: 0 | Status: DISCONTINUED | OUTPATIENT
Start: 2018-05-15 | End: 2018-05-16

## 2018-05-15 RX ADMIN — Medication 650 MILLIGRAM(S): at 21:13

## 2018-05-15 RX ADMIN — BENZOCAINE AND MENTHOL 1 LOZENGE: 5; 1 LIQUID ORAL at 18:45

## 2018-05-15 RX ADMIN — ONDANSETRON 4 MILLIGRAM(S): 8 TABLET, FILM COATED ORAL at 08:05

## 2018-05-15 RX ADMIN — Medication 650 MILLIGRAM(S): at 08:13

## 2018-05-15 RX ADMIN — ONDANSETRON 4 MILLIGRAM(S): 8 TABLET, FILM COATED ORAL at 00:57

## 2018-05-15 RX ADMIN — Medication 650 MILLIGRAM(S): at 22:00

## 2018-05-15 RX ADMIN — OXYCODONE HYDROCHLORIDE 5 MILLIGRAM(S): 5 TABLET ORAL at 00:57

## 2018-05-15 RX ADMIN — Medication 100 MILLIGRAM(S): at 21:13

## 2018-05-15 RX ADMIN — Medication 650 MILLIGRAM(S): at 15:10

## 2018-05-15 RX ADMIN — HEPARIN SODIUM 5000 UNIT(S): 5000 INJECTION INTRAVENOUS; SUBCUTANEOUS at 05:36

## 2018-05-15 RX ADMIN — Medication 100 MILLIGRAM(S): at 07:22

## 2018-05-15 RX ADMIN — Medication 650 MILLIGRAM(S): at 09:00

## 2018-05-15 RX ADMIN — Medication 45 MILLIGRAM(S): at 05:35

## 2018-05-15 RX ADMIN — OXYCODONE HYDROCHLORIDE 5 MILLIGRAM(S): 5 TABLET ORAL at 01:20

## 2018-05-15 RX ADMIN — SENNA PLUS 2 TABLET(S): 8.6 TABLET ORAL at 21:13

## 2018-05-15 RX ADMIN — SODIUM CHLORIDE 75 MILLILITER(S): 9 INJECTION, SOLUTION INTRAVENOUS at 21:13

## 2018-05-15 RX ADMIN — HEPARIN SODIUM 5000 UNIT(S): 5000 INJECTION INTRAVENOUS; SUBCUTANEOUS at 18:45

## 2018-05-15 RX ADMIN — Medication 650 MILLIGRAM(S): at 16:00

## 2018-05-15 RX ADMIN — Medication 100 MILLIGRAM(S): at 13:04

## 2018-05-15 NOTE — PROGRESS NOTE ADULT - SUBJECTIVE AND OBJECTIVE BOX
Patient is a 68y old  Male who presents with a chief complaint of "prostate removal" (02 May 2018 08:38)      SUBJECTIVE / OVERNIGHT EVENTS:  c/O mild pain of lower abd. No flatus or BM.    MEDICATIONS  (STANDING):  dextrose 5% + sodium chloride 0.45%. 1000 milliLiter(s) (75 mL/Hr) IV Continuous <Continuous>  docusate sodium 100 milliGRAM(s) Oral three times a day  heparin  Injectable 5000 Unit(s) SubCutaneous every 12 hours  senna 2 Tablet(s) Oral at bedtime  thyroid 45 milliGRAM(s) Oral daily    MEDICATIONS  (PRN):  acetaminophen   Tablet. 650 milliGRAM(s) Oral every 6 hours PRN headache  benzocaine 15 mG/menthol 3.6 mG Lozenge 1 Lozenge Oral four times a day PRN Sore Throat  fluticasone propionate 50 MICROgram(s)/spray Nasal Spray 1 Spray(s) Both Nostrils <User Schedule> PRN rhinorrhea  lidocaine 5% Ointment 1 Application(s) Topical every 3 hours PRN urethral pain  ondansetron Injectable 4 milliGRAM(s) IV Push every 6 hours PRN Nausea and/or Vomiting  oxybutynin 5 milliGRAM(s) Oral three times a day PRN Bladder spasms  oxyCODONE    IR 5 milliGRAM(s) Oral every 4 hours PRN Mild Pain (1 - 3)  oxyCODONE    IR 10 milliGRAM(s) Oral every 4 hours PRN mod-severe pain  tetrahydrazoline Solution 1 Drop(s) Both EYES four times a day PRN dry eyes      Vital Signs Last 24 Hrs  T(C): 36.7 (15 May 2018 10:40), Max: 37 (14 May 2018 21:19)  T(F): 98.1 (15 May 2018 10:40), Max: 98.6 (14 May 2018 21:19)  HR: 54 (15 May 2018 10:40) (54 - 81)  BP: 110/64 (15 May 2018 10:40) (106/81 - 130/70)  BP(mean): --  RR: 18 (15 May 2018 10:40) (10 - 18)  SpO2: 99% (15 May 2018 10:40) (93% - 100%)  CAPILLARY BLOOD GLUCOSE        I&O's Summary    14 May 2018 07:01  -  15 May 2018 07:00  --------------------------------------------------------  IN: 750 mL / OUT: 1446.5 mL / NET: -696.5 mL    15 May 2018 07:01  -  15 May 2018 11:01  --------------------------------------------------------  IN: 0 mL / OUT: 320 mL / NET: -320 mL        PHYSICAL EXAM:  GENERAL: NAD, well-developed  HEAD:  Atraumatic, Normocephalic  EYES: EOMI, PERRLA, conjunctiva and sclera clear  NECK: Supple, No JVD  CHEST/LUNG: Clear to auscultation bilaterally; No wheeze  HEART: Regular rate and rhythm; No murmurs, rubs, or gallops  ABDOMEN: Soft, mildly tender, Nondistended; Bowel sounds present  : (+) rosas with clear urine  EXTREMITIES:  2+ Peripheral Pulses, No clubbing, cyanosis, or edema  PSYCH: AAOx3  NEUROLOGY: non-focal  SKIN: No rashes or lesions    LABS:                        12.8   7.76  )-----------( 154      ( 15 May 2018 05:24 )             38.6     05-15    140  |  105  |  16  ----------------------------<  135<H>  4.5   |  24  |  1.24    Ca    8.4      15 May 2018 05:24                RADIOLOGY & ADDITIONAL TESTS:    Imaging Personally Reviewed:    Consultant(s) Notes Reviewed:      Care Discussed with Consultants/Other Providers:

## 2018-05-15 NOTE — PROGRESS NOTE ADULT - SUBJECTIVE AND OBJECTIVE BOX
POD #1    Afeb 117/60 77 99%RA    Pt has no c/o    Abd- soft NT ND; no flatus     wounds C&D    Way 750  NAWAF 16

## 2018-05-16 ENCOUNTER — TRANSCRIPTION ENCOUNTER (OUTPATIENT)
Age: 68
End: 2018-05-16

## 2018-05-16 VITALS
DIASTOLIC BLOOD PRESSURE: 58 MMHG | TEMPERATURE: 99 F | HEART RATE: 53 BPM | OXYGEN SATURATION: 96 % | SYSTOLIC BLOOD PRESSURE: 118 MMHG | RESPIRATION RATE: 18 BRPM

## 2018-05-16 LAB
BUN SERPL-MCNC: 11 MG/DL — SIGNIFICANT CHANGE UP (ref 7–23)
CALCIUM SERPL-MCNC: 8.6 MG/DL — SIGNIFICANT CHANGE UP (ref 8.4–10.5)
CHLORIDE SERPL-SCNC: 104 MMOL/L — SIGNIFICANT CHANGE UP (ref 98–107)
CO2 SERPL-SCNC: 25 MMOL/L — SIGNIFICANT CHANGE UP (ref 22–31)
CREAT FLD-MCNC: 1.36 MG/DL — SIGNIFICANT CHANGE UP
CREAT SERPL-MCNC: 1.25 MG/DL — SIGNIFICANT CHANGE UP (ref 0.5–1.3)
GLUCOSE SERPL-MCNC: 120 MG/DL — HIGH (ref 70–99)
POTASSIUM SERPL-MCNC: 4.5 MMOL/L — SIGNIFICANT CHANGE UP (ref 3.5–5.3)
POTASSIUM SERPL-SCNC: 4.5 MMOL/L — SIGNIFICANT CHANGE UP (ref 3.5–5.3)
SODIUM SERPL-SCNC: 139 MMOL/L — SIGNIFICANT CHANGE UP (ref 135–145)

## 2018-05-16 PROCEDURE — 99232 SBSQ HOSP IP/OBS MODERATE 35: CPT

## 2018-05-16 RX ORDER — OXYBUTYNIN CHLORIDE 5 MG
1 TABLET ORAL
Qty: 0 | Refills: 0 | COMMUNITY
Start: 2018-05-16

## 2018-05-16 RX ORDER — OXYBUTYNIN CHLORIDE 5 MG
1 TABLET ORAL
Qty: 21 | Refills: 0
Start: 2018-05-16 | End: 2018-05-22

## 2018-05-16 RX ADMIN — Medication 650 MILLIGRAM(S): at 15:11

## 2018-05-16 RX ADMIN — Medication 650 MILLIGRAM(S): at 10:20

## 2018-05-16 RX ADMIN — Medication 650 MILLIGRAM(S): at 04:00

## 2018-05-16 RX ADMIN — Medication 45 MILLIGRAM(S): at 05:40

## 2018-05-16 RX ADMIN — HEPARIN SODIUM 5000 UNIT(S): 5000 INJECTION INTRAVENOUS; SUBCUTANEOUS at 05:40

## 2018-05-16 RX ADMIN — Medication 650 MILLIGRAM(S): at 09:42

## 2018-05-16 RX ADMIN — Medication 100 MILLIGRAM(S): at 05:40

## 2018-05-16 RX ADMIN — Medication 650 MILLIGRAM(S): at 15:40

## 2018-05-16 RX ADMIN — Medication 650 MILLIGRAM(S): at 03:17

## 2018-05-16 RX ADMIN — Medication 100 MILLIGRAM(S): at 15:11

## 2018-05-16 NOTE — DISCHARGE NOTE ADULT - NS AS ACTIVITY OBS
Showering allowed/Walking-Indoors allowed/Stairs allowed/No Heavy lifting/straining/Walking-Outdoors allowed

## 2018-05-16 NOTE — DISCHARGE NOTE ADULT - CARE PLAN
Principal Discharge DX:	Malignant neoplasm of prostate  Goal:	prostatectomy  Assessment and plan of treatment:	as above; drink plenty of fluids; change dressing at drain site daily or as needed then may remove; rosas care as instructed; call office for fever over 101; nausea; vomiting Principal Discharge DX:	Malignant neoplasm of prostate  Goal:	prostatectomy  Assessment and plan of treatment:	as above; drink plenty of fluids; change dressing at drain site daily or as needed then may remove; rosas care as instructed; call office for fever over 101; nausea; vomiting; avoid constipation while taking pain meds with stool softener/fiber

## 2018-05-16 NOTE — DISCHARGE NOTE ADULT - PLAN OF CARE
prostatectomy as above; drink plenty of fluids; change dressing at drain site daily or as needed then may remove; rosas care as instructed; call office for fever over 101; nausea; vomiting as above; drink plenty of fluids; change dressing at drain site daily or as needed then may remove; rosas care as instructed; call office for fever over 101; nausea; vomiting; avoid constipation while taking pain meds with stool softener/fiber

## 2018-05-16 NOTE — DISCHARGE NOTE ADULT - PATIENT PORTAL LINK FT
You can access the [facility], offered by [facility], by registering with the following website: [email or web address]

## 2018-05-16 NOTE — PROGRESS NOTE ADULT - SUBJECTIVE AND OBJECTIVE BOX
Patient is a 68y old  Male who presents with a chief complaint of "prostate removal" (02 May 2018 08:38)      SUBJECTIVE / OVERNIGHT EVENTS: No complaints. Ambulating, (+) Flatus    MEDICATIONS  (STANDING):  dextrose 5% + sodium chloride 0.45%. 1000 milliLiter(s) (75 mL/Hr) IV Continuous <Continuous>  docusate sodium 100 milliGRAM(s) Oral three times a day  heparin  Injectable 5000 Unit(s) SubCutaneous every 12 hours  senna 2 Tablet(s) Oral at bedtime  thyroid 45 milliGRAM(s) Oral daily    MEDICATIONS  (PRN):  acetaminophen   Tablet. 650 milliGRAM(s) Oral every 6 hours PRN headache  benzocaine 15 mG/menthol 3.6 mG Lozenge 1 Lozenge Oral four times a day PRN Sore Throat  fluticasone propionate 50 MICROgram(s)/spray Nasal Spray 1 Spray(s) Both Nostrils <User Schedule> PRN rhinorrhea  lidocaine 5% Ointment 1 Application(s) Topical every 3 hours PRN urethral pain  metoclopramide Injectable 10 milliGRAM(s) IV Push every 6 hours PRN nausea  ondansetron Injectable 4 milliGRAM(s) IV Push every 6 hours PRN Nausea and/or Vomiting  oxybutynin 5 milliGRAM(s) Oral three times a day PRN Bladder spasms  oxyCODONE    IR 5 milliGRAM(s) Oral every 4 hours PRN Mild Pain (1 - 3)  oxyCODONE    IR 10 milliGRAM(s) Oral every 4 hours PRN mod-severe pain  tetrahydrazoline Solution 1 Drop(s) Both EYES four times a day PRN dry eyes      Vital Signs Last 24 Hrs  T(C): 37.1 (16 May 2018 08:53), Max: 37.7 (15 May 2018 16:44)  T(F): 98.8 (16 May 2018 08:53), Max: 99.8 (15 May 2018 16:44)  HR: 53 (16 May 2018 08:53) (50 - 64)  BP: 118/58 (16 May 2018 08:53) (108/53 - 142/64)  BP(mean): --  RR: 18 (16 May 2018 08:53) (16 - 18)  SpO2: 96% (16 May 2018 08:53) (96% - 99%)  CAPILLARY BLOOD GLUCOSE        I&O's Summary    15 May 2018 07:01  -  16 May 2018 07:00  --------------------------------------------------------  IN: 0 mL / OUT: 2860.5 mL / NET: -2860.5 mL    16 May 2018 07:01  -  16 May 2018 11:49  --------------------------------------------------------  IN: 0 mL / OUT: 402.5 mL / NET: -402.5 mL        PHYSICAL EXAM:  GENERAL: NAD, well-developed  HEAD:  Atraumatic, Normocephalic  EYES: EOMI, PERRLA, conjunctiva and sclera clear  NECK: Supple, No JVD  CHEST/LUNG: Clear to auscultation bilaterally; No wheeze  HEART: Regular rate and rhythm; No murmurs, rubs, or gallops  ABDOMEN: Soft, Nontender, Nondistended; Bowel sounds present  : (+) rosas with clear urine  EXTREMITIES:  2+ Peripheral Pulses, No clubbing, cyanosis, or edema  PSYCH: AAOx3  NEUROLOGY: non-focal  SKIN: No rashes or lesions    LABS:                        12.8   7.76  )-----------( 154      ( 15 May 2018 05:24 )             38.6     05-16    139  |  104  |  11  ----------------------------<  120<H>  4.5   |  25  |  1.25    Ca    8.6      16 May 2018 05:30                RADIOLOGY & ADDITIONAL TESTS:    Imaging Personally Reviewed:    Consultant(s) Notes Reviewed:      Care Discussed with Consultants/Other Providers:

## 2018-05-16 NOTE — DISCHARGE NOTE ADULT - MEDICATION SUMMARY - MEDICATIONS TO TAKE
I will START or STAY ON the medications listed below when I get home from the hospital:    5MTHF plus B12  -- 1 cap(s) by mouth once a day. last dose 5/6/18  -- Indication: For Home med    vitamin C plus D  -- 1 cap(s) by mouth once a day  -- Indication: For Home med    Percocet 5/325 oral tablet  -- 2 tab(s) by mouth every 6 hours, As Needed MDD:8   -- Caution federal law prohibits the transfer of this drug to any person other  than the person for whom it was prescribed.  May cause drowsiness.  Alcohol may intensify this effect.  Use care when operating dangerous machinery.  This prescription cannot be refilled.  This product contains acetaminophen.  Do not use  with any other product containing acetaminophen to prevent possible liver damage.  Using more of this medication than prescribed may cause serious breathing problems.    -- Indication: For Pain    Saw Palmetto oral capsule  -- 1 cap(s) by mouth once a day. last dose 5/6/18  -- Indication: For Home med    magnesium citrate  -- 1 tab(s) by mouth once a day  -- Indication: For Home med    Flonase 50 mcg/inh nasal spray  -- 1 spray(s) into nose once a day, As Needed  -- Indication: For Same    glutamine 500 mg oral capsule  -- 1 cap(s) by mouth once a day. Last dose 5/6/18  -- Indication: For Same    Fish Oil oral capsule  -- 1 cap(s) by mouth 2 times a day. last dose 5/6  -- Indication: For Same    Visine 0.05% ophthalmic solution  -- 1 drop(s) to each affected eye 4 times a day, As Needed  -- Indication: For Same    Probiotic Formula oral capsule  -- 1 cap(s) by mouth once a day  -- Indication: For Same    [place] Thyroid  -- 45 milligram(s) by mouth once a day AM  -- Indication: For Same    oxybutynin 5 mg oral tablet  -- 1 tab(s) by mouth 3 times a day, As needed, Bladder spasms  -- Indication: For for bladder spasms; stop taking 24 hrs before your appt next week

## 2018-05-16 NOTE — DISCHARGE NOTE ADULT - HOSPITAL COURSE
Pt had RALP 2 days ago; ambulating; passed gas yesterday and britni reg diet today; NAWAF removed; home with rosas

## 2018-05-16 NOTE — DISCHARGE NOTE ADULT - CONDITIONS AT DISCHARGE
Stable.  OOB ambulating independently, Way catheter draining adequate urine, tolerating diet, scope sites intact.  Patient verbalizes readiness for discharge

## 2018-05-16 NOTE — DISCHARGE NOTE ADULT - INSTRUCTIONS
as tolerated Call MD for temperature greater than 100.5, pain not relieved by pain medication Call MD for temperature greater than 101, pain not relieved by pain medication, if urine stops draining from Way or if it becomes bright red or has clots, excessive bleeding from surgical incisions, trouble tolerating food, trouble having a bowel movement.  Please continue to take over the counter stool softeners to help with constipation which can be a side effect from pain medication.  Please schedule a follow up appointment with Dr. Mckeon.

## 2018-05-16 NOTE — DISCHARGE NOTE ADULT - CARE PROVIDERS DIRECT ADDRESSES
[email or web address]

## 2018-05-16 NOTE — PROGRESS NOTE ADULT - PROBLEM SELECTOR PLAN 1
POD#2.  management as per   pain control  Incentive spirometer  OOB and Ambulate  DVt prophylaxis  Monitor for GI function

## 2018-05-16 NOTE — PROGRESS NOTE ADULT - SUBJECTIVE AND OBJECTIVE BOX
POD #2  Afeb 122/60 56 96%RA    Pt has no c/o    Abd- soft NT ND; + flatus     wounds C&D    Rayna 600  NAWAF 16

## 2018-05-17 LAB — SURGICAL PATHOLOGY STUDY: SIGNIFICANT CHANGE UP

## 2018-05-18 ENCOUNTER — TRANSCRIPTION ENCOUNTER (OUTPATIENT)
Age: 68
End: 2018-05-18

## 2018-05-22 ENCOUNTER — APPOINTMENT (OUTPATIENT)
Dept: UROLOGY | Facility: CLINIC | Age: 68
End: 2018-05-22
Payer: COMMERCIAL

## 2018-05-22 PROCEDURE — 99024 POSTOP FOLLOW-UP VISIT: CPT

## 2018-05-25 ENCOUNTER — APPOINTMENT (OUTPATIENT)
Dept: UROLOGY | Facility: CLINIC | Age: 68
End: 2018-05-25

## 2018-06-18 ENCOUNTER — APPOINTMENT (OUTPATIENT)
Dept: ORTHOPEDIC SURGERY | Facility: CLINIC | Age: 68
End: 2018-06-18

## 2018-07-10 ENCOUNTER — APPOINTMENT (OUTPATIENT)
Dept: UROLOGY | Facility: CLINIC | Age: 68
End: 2018-07-10
Payer: COMMERCIAL

## 2018-07-10 VITALS
HEART RATE: 48 BPM | TEMPERATURE: 97.7 F | RESPIRATION RATE: 17 BRPM | DIASTOLIC BLOOD PRESSURE: 67 MMHG | SYSTOLIC BLOOD PRESSURE: 130 MMHG

## 2018-07-10 LAB — PSA SERPL-MCNC: <0.01 NG/ML

## 2018-07-10 PROCEDURE — 99024 POSTOP FOLLOW-UP VISIT: CPT

## 2018-08-13 NOTE — ASU PATIENT PROFILE, ADULT - SITE
prostate negative Affect and characteristics of appearance, verbalizations, behaviors are appropriate

## 2018-08-14 ENCOUNTER — APPOINTMENT (OUTPATIENT)
Dept: CARDIOLOGY | Facility: CLINIC | Age: 68
End: 2018-08-14
Payer: COMMERCIAL

## 2018-08-14 ENCOUNTER — NON-APPOINTMENT (OUTPATIENT)
Age: 68
End: 2018-08-14

## 2018-08-14 VITALS
OXYGEN SATURATION: 99 % | WEIGHT: 163 LBS | HEART RATE: 47 BPM | BODY MASS INDEX: 22.82 KG/M2 | HEIGHT: 71 IN | SYSTOLIC BLOOD PRESSURE: 120 MMHG | DIASTOLIC BLOOD PRESSURE: 74 MMHG

## 2018-08-14 DIAGNOSIS — N52.9 MALE ERECTILE DYSFUNCTION, UNSPECIFIED: ICD-10-CM

## 2018-08-14 PROCEDURE — 93000 ELECTROCARDIOGRAM COMPLETE: CPT

## 2018-08-14 PROCEDURE — 99214 OFFICE O/P EST MOD 30 MIN: CPT

## 2018-08-14 PROCEDURE — 36415 COLL VENOUS BLD VENIPUNCTURE: CPT

## 2018-08-15 LAB
25(OH)D3 SERPL-MCNC: 68 NG/ML
ALBUMIN SERPL ELPH-MCNC: 4.6 G/DL
ALP BLD-CCNC: 86 U/L
ALT SERPL-CCNC: 11 U/L
ANION GAP SERPL CALC-SCNC: 15 MMOL/L
AST SERPL-CCNC: 20 U/L
BASOPHILS # BLD AUTO: 0.02 K/UL
BASOPHILS NFR BLD AUTO: 0.4 %
BILIRUB SERPL-MCNC: 0.3 MG/DL
BUN SERPL-MCNC: 21 MG/DL
CALCIUM SERPL-MCNC: 9.9 MG/DL
CHLORIDE SERPL-SCNC: 103 MMOL/L
CHOLEST SERPL-MCNC: 187 MG/DL
CHOLEST/HDLC SERPL: 3.9 RATIO
CO2 SERPL-SCNC: 23 MMOL/L
CREAT SERPL-MCNC: 1.18 MG/DL
EOSINOPHIL # BLD AUTO: 0.14 K/UL
EOSINOPHIL NFR BLD AUTO: 2.7 %
GLUCOSE SERPL-MCNC: 44 MG/DL
HBA1C MFR BLD HPLC: 5.5 %
HCT VFR BLD CALC: 45.7 %
HDLC SERPL-MCNC: 48 MG/DL
HGB BLD-MCNC: 14.7 G/DL
IMM GRANULOCYTES NFR BLD AUTO: 0.2 %
LDLC SERPL CALC-MCNC: 120 MG/DL
LYMPHOCYTES # BLD AUTO: 2.01 K/UL
LYMPHOCYTES NFR BLD AUTO: 38.7 %
MAN DIFF?: NORMAL
MCHC RBC-ENTMCNC: 32.2 GM/DL
MCHC RBC-ENTMCNC: 32.2 PG
MCV RBC AUTO: 100.2 FL
MONOCYTES # BLD AUTO: 0.47 K/UL
MONOCYTES NFR BLD AUTO: 9.1 %
NEUTROPHILS # BLD AUTO: 2.54 K/UL
NEUTROPHILS NFR BLD AUTO: 48.9 %
PLATELET # BLD AUTO: 189 K/UL
POTASSIUM SERPL-SCNC: 4.4 MMOL/L
PROT SERPL-MCNC: 7 G/DL
RBC # BLD: 4.56 M/UL
RBC # FLD: 13.7 %
SODIUM SERPL-SCNC: 141 MMOL/L
TRIGL SERPL-MCNC: 97 MG/DL
TSH SERPL-ACNC: 2.81 UIU/ML
WBC # FLD AUTO: 5.19 K/UL

## 2018-10-11 PROBLEM — K58.9 IRRITABLE BOWEL SYNDROME, UNSPECIFIED: Chronic | Status: ACTIVE | Noted: 2018-05-02

## 2018-10-11 PROBLEM — K58.9 IRRITABLE BOWEL SYNDROME WITHOUT DIARRHEA: Chronic | Status: ACTIVE | Noted: 2018-05-02

## 2018-10-11 PROBLEM — C61 MALIGNANT NEOPLASM OF PROSTATE: Chronic | Status: ACTIVE | Noted: 2018-05-02

## 2018-10-11 PROBLEM — E03.9 HYPOTHYROIDISM, UNSPECIFIED: Chronic | Status: ACTIVE | Noted: 2018-05-02

## 2018-10-11 PROBLEM — E78.5 HYPERLIPIDEMIA, UNSPECIFIED: Chronic | Status: ACTIVE | Noted: 2018-05-02

## 2018-10-24 LAB — PSA SERPL-MCNC: <0.01 NG/ML

## 2018-10-30 ENCOUNTER — APPOINTMENT (OUTPATIENT)
Dept: UROLOGY | Facility: CLINIC | Age: 68
End: 2018-10-30
Payer: COMMERCIAL

## 2018-10-30 PROCEDURE — 99213 OFFICE O/P EST LOW 20 MIN: CPT

## 2018-11-01 ENCOUNTER — APPOINTMENT (OUTPATIENT)
Dept: UROLOGY | Facility: CLINIC | Age: 68
End: 2018-11-01
Payer: COMMERCIAL

## 2018-11-01 PROCEDURE — 99214 OFFICE O/P EST MOD 30 MIN: CPT

## 2018-12-13 ENCOUNTER — APPOINTMENT (OUTPATIENT)
Dept: UROLOGY | Facility: CLINIC | Age: 68
End: 2018-12-13

## 2019-06-04 LAB — PSA SERPL-MCNC: <0.01 NG/ML

## 2019-09-17 ENCOUNTER — APPOINTMENT (OUTPATIENT)
Dept: CARDIOLOGY | Facility: CLINIC | Age: 69
End: 2019-09-17
Payer: COMMERCIAL

## 2019-09-17 ENCOUNTER — NON-APPOINTMENT (OUTPATIENT)
Age: 69
End: 2019-09-17

## 2019-09-17 VITALS
BODY MASS INDEX: 22.82 KG/M2 | SYSTOLIC BLOOD PRESSURE: 111 MMHG | DIASTOLIC BLOOD PRESSURE: 68 MMHG | HEIGHT: 71 IN | HEART RATE: 48 BPM | OXYGEN SATURATION: 98 % | WEIGHT: 163 LBS

## 2019-09-17 DIAGNOSIS — R97.20 ELEVATED PROSTATE, SPECIFIC ANTIGEN [PSA]: ICD-10-CM

## 2019-09-17 PROCEDURE — 36415 COLL VENOUS BLD VENIPUNCTURE: CPT

## 2019-09-17 PROCEDURE — 99214 OFFICE O/P EST MOD 30 MIN: CPT

## 2019-09-17 PROCEDURE — 93000 ELECTROCARDIOGRAM COMPLETE: CPT

## 2019-09-17 NOTE — PHYSICAL EXAM
[Normal Appearance] : normal appearance [General Appearance - Well Developed] : well developed [Well Groomed] : well groomed [General Appearance - Well Nourished] : well nourished [General Appearance - In No Acute Distress] : no acute distress [No Deformities] : no deformities [No Oral Pallor] : no oral pallor [Normal Oral Mucosa] : normal oral mucosa [No Oral Cyanosis] : no oral cyanosis [Normal Oropharynx] : normal oropharynx [Normal Jugular Venous A Waves Present] : normal jugular venous A waves present [Normal Jugular Venous V Waves Present] : normal jugular venous V waves present [Respiration, Rhythm And Depth] : normal respiratory rhythm and effort [] : no respiratory distress [No Jugular Venous Black A Waves] : no jugular venous black A waves [Exaggerated Use Of Accessory Muscles For Inspiration] : no accessory muscle use [Auscultation Breath Sounds / Voice Sounds] : lungs were clear to auscultation bilaterally [Bowel Sounds] : normal bowel sounds [Abdomen Soft] : soft [Gait - Sufficient For Exercise Testing] : the gait was sufficient for exercise testing [Abdomen Tenderness] : non-tender [Abnormal Walk] : normal gait [Cyanosis, Localized] : no localized cyanosis [Nail Clubbing] : no clubbing of the fingernails [No Xanthoma] : no  xanthoma was observed [No Venous Stasis] : no venous stasis [Skin Color & Pigmentation] : normal skin color and pigmentation [Oriented To Time, Place, And Person] : oriented to person, place, and time [Impaired Insight] : insight and judgment were intact [No Anxiety] : not feeling anxious [Affect] : the affect was normal [Mood] : the mood was normal [Conjunctiva] : the conjunctiva were normal in both eyes [EOM Intact] : extraocular movements were intact [PERRL] : pupils were equal in size, round, and reactive to light [5th Left ICS - MCL] : palpated at the 5th LICS in the midclavicular line [No Precordial Heave] : no precordial heave was noted [Normal] : normal [Rhythm Regular] : regular [Bradycardia] : bradycardic [Normal S1] : normal S1 [Normal S2] : normal S2 [No Gallop] : no gallop heard [No Murmur] : no murmurs heard [Right Carotid Bruit] : no bruit heard over the right carotid [Left Carotid Bruit] : no bruit heard over the left carotid [2+] : left 2+ [No Abnormalities] : the abdominal aorta was not enlarged and no bruit was heard [No Pitting Edema] : no pitting edema present

## 2019-09-17 NOTE — REASON FOR VISIT
[Follow-Up - Clinic] : a clinic follow-up of [FreeTextEntry2] : erectile dysfunction [FreeTextEntry1] : Patient presents today for follow-up.

## 2019-09-17 NOTE — DISCUSSION/SUMMARY
[FreeTextEntry1] : Very pleasant 69 year-old gentleman with inflammatory bowel disease (ulcerative colitis) presents today for follow-up. His EKG shows sinus bradycardia consistent with his conditioning (he rides more than 20 miles on a bike several times per week), but there are diffuse nonspecific ST-T changes, which are his baseline.\par \par Follow-up routine labs.

## 2019-09-17 NOTE — HISTORY OF PRESENT ILLNESS
[FreeTextEntry1] : Patient presents today for follow-up. He is in his usual state of health. His erectile dysfunction had resolved without intervention. More recently, however, he noted an elevated PSA and 4K score. He is now status post robotic prostatectomy. He has urinary continence but needs Viagra for sexual function. He has resumed exercise after prostatectomy. He is exercising at approximately 80% of his prior intensity. He favors bike riding for aerobic exercise.\par \par Patient is up to date with colonoscopy, having had one in 2017. He follows with Dr. Dinero.\par \par PMD: BINH Valencia [phone]\par GI: Brent Dinero MD [phone]\par Urologist: Jere Duncan MD [phone] and Carlin Mckeon MD [phone]

## 2019-09-18 LAB
25(OH)D3 SERPL-MCNC: 38.1 NG/ML
ALBUMIN SERPL ELPH-MCNC: 4.6 G/DL
ALP BLD-CCNC: 100 U/L
ALT SERPL-CCNC: 12 U/L
ANION GAP SERPL CALC-SCNC: 10 MMOL/L
AST SERPL-CCNC: 20 U/L
BASOPHILS # BLD AUTO: 0.05 K/UL
BASOPHILS NFR BLD AUTO: 0.9 %
BILIRUB SERPL-MCNC: 0.3 MG/DL
BUN SERPL-MCNC: 20 MG/DL
CALCIUM SERPL-MCNC: 9.5 MG/DL
CHLORIDE SERPL-SCNC: 106 MMOL/L
CHOLEST SERPL-MCNC: 185 MG/DL
CHOLEST/HDLC SERPL: 4 RATIO
CO2 SERPL-SCNC: 25 MMOL/L
CREAT SERPL-MCNC: 1.2 MG/DL
EOSINOPHIL # BLD AUTO: 0.18 K/UL
EOSINOPHIL NFR BLD AUTO: 3.2 %
ESTIMATED AVERAGE GLUCOSE: 111 MG/DL
GLUCOSE SERPL-MCNC: 85 MG/DL
HBA1C MFR BLD HPLC: 5.5 %
HCT VFR BLD CALC: 48.5 %
HDLC SERPL-MCNC: 46 MG/DL
HGB BLD-MCNC: 15.7 G/DL
IMM GRANULOCYTES NFR BLD AUTO: 0.2 %
LDLC SERPL CALC-MCNC: 110 MG/DL
LYMPHOCYTES # BLD AUTO: 1.8 K/UL
LYMPHOCYTES NFR BLD AUTO: 32.4 %
MAN DIFF?: NORMAL
MCHC RBC-ENTMCNC: 32.4 GM/DL
MCHC RBC-ENTMCNC: 33.3 PG
MCV RBC AUTO: 102.8 FL
MONOCYTES # BLD AUTO: 0.62 K/UL
MONOCYTES NFR BLD AUTO: 11.2 %
NEUTROPHILS # BLD AUTO: 2.9 K/UL
NEUTROPHILS NFR BLD AUTO: 52.1 %
PLATELET # BLD AUTO: 166 K/UL
POTASSIUM SERPL-SCNC: 5.1 MMOL/L
PROT SERPL-MCNC: 6.9 G/DL
RBC # BLD: 4.72 M/UL
RBC # FLD: 13.3 %
SODIUM SERPL-SCNC: 141 MMOL/L
TRIGL SERPL-MCNC: 145 MG/DL
TSH SERPL-ACNC: 3.41 UIU/ML
WBC # FLD AUTO: 5.56 K/UL

## 2019-09-22 ENCOUNTER — TRANSCRIPTION ENCOUNTER (OUTPATIENT)
Age: 69
End: 2019-09-22

## 2019-10-29 ENCOUNTER — MOBILE ON CALL (OUTPATIENT)
Age: 69
End: 2019-10-29

## 2019-11-05 ENCOUNTER — APPOINTMENT (OUTPATIENT)
Dept: SURGERY | Facility: CLINIC | Age: 69
End: 2019-11-05
Payer: COMMERCIAL

## 2019-11-05 VITALS
DIASTOLIC BLOOD PRESSURE: 70 MMHG | WEIGHT: 161 LBS | RESPIRATION RATE: 15 BRPM | HEIGHT: 71 IN | OXYGEN SATURATION: 98 % | SYSTOLIC BLOOD PRESSURE: 107 MMHG | HEART RATE: 50 BPM | TEMPERATURE: 98.2 F | BODY MASS INDEX: 22.54 KG/M2

## 2019-11-05 DIAGNOSIS — Z85.46 PERSONAL HISTORY OF MALIGNANT NEOPLASM OF PROSTATE: ICD-10-CM

## 2019-11-05 DIAGNOSIS — K57.30 DIVERTICULOSIS OF LARGE INTESTINE W/OUT PERFORATION OR ABSCESS W/OUT BLEEDING: ICD-10-CM

## 2019-11-05 PROCEDURE — 99204 OFFICE O/P NEW MOD 45 MIN: CPT

## 2019-11-05 RX ORDER — SILDENAFIL 20 MG/1
20 TABLET ORAL
Qty: 90 | Refills: 1 | Status: DISCONTINUED | COMMUNITY
Start: 2018-11-01 | End: 2019-11-05

## 2019-11-05 RX ORDER — VITAMIN B COMPLEX
CAPSULE ORAL
Refills: 0 | Status: ACTIVE | COMMUNITY

## 2019-11-05 RX ORDER — THYROID, PORCINE 15 MG/1
15 TABLET ORAL
Qty: 90 | Refills: 0 | Status: DISCONTINUED | COMMUNITY
Start: 2017-12-18 | End: 2019-11-05

## 2019-11-05 RX ORDER — THYROID, PORCINE 30 MG/1
30 TABLET ORAL
Qty: 90 | Refills: 0 | Status: DISCONTINUED | COMMUNITY
Start: 2017-08-09 | End: 2019-11-05

## 2019-11-05 RX ORDER — CHROMIUM 200 MCG
TABLET ORAL
Refills: 0 | Status: ACTIVE | COMMUNITY

## 2019-11-05 RX ORDER — PSYLLIUM HUSK 0.4 G
CAPSULE ORAL
Refills: 0 | Status: ACTIVE | COMMUNITY

## 2019-11-05 RX ORDER — CETIRIZINE HYDROCHLORIDE 10 MG/1
10 TABLET, FILM COATED ORAL DAILY
Qty: 30 | Refills: 3 | Status: DISCONTINUED | COMMUNITY
Start: 2017-04-28 | End: 2019-11-05

## 2019-11-05 RX ORDER — OMEGA-3/DHA/EPA/FISH OIL 300-1000MG
1000 CAPSULE,DELAYED RELEASE (ENTERIC COATED) ORAL TWICE DAILY
Qty: 90 | Refills: 0 | Status: DISCONTINUED | COMMUNITY
Start: 2017-04-28 | End: 2019-11-05

## 2019-11-05 RX ORDER — SILDENAFIL 100 MG/1
100 TABLET, FILM COATED ORAL
Qty: 3 | Refills: 6 | Status: DISCONTINUED | COMMUNITY
Start: 2018-07-10 | End: 2019-11-05

## 2019-11-05 NOTE — PLAN
[FreeTextEntry1] : Advised elective mesh repair in ambulatory OR ([facility]). Discussed with patient nature of, indications for and risks/benefits of surgery.

## 2019-11-05 NOTE — HISTORY OF PRESENT ILLNESS
[de-identified] : Ry is a 70 y/o male here for evaluation of right groin pain. Reports an intermittent inguinal bulge.  [de-identified] : 2 weeks ago patient experienced acute onset of sharp right groin pain during dinner. Symptoms resolved after some time but recurred for short periods almost daily thereafter. About one week ago patient first became aware of bulge in the right groin which reduces with recumbency. Appetite and bowel habits remain normal. Family history negative for hernias.

## 2019-11-05 NOTE — ASSESSMENT
[FreeTextEntry1] : 69-year-old male status post prior robotic prostatectomy now with symptomatic right inguinal hernia.

## 2019-11-05 NOTE — PHYSICAL EXAM
[Normal Breath Sounds] : Normal breath sounds [Normal Heart Sounds] : normal heart sounds [Normal Rate and Rhythm] : normal rate and rhythm [No Rash or Lesion] : No rash or lesion [Alert] : alert [Oriented to Person] : oriented to person [Oriented to Place] : oriented to place [Oriented to Time] : oriented to time [Calm] : calm [JVD] : no jugular venous distention  [de-identified] : Well nourished male, in no apparent distress [de-identified] : WNL [de-identified] : Soft, nondistended, nontender. No palpable mass or organomegaly. Well-healed surgical scar at the umbilicus and trocar scars across the upper abdomen. Left groin-  no palpable hernia. Right groin-  moderate sized, nontender, reducible inguinal hernia. [de-identified] : External genitalia normal [de-identified] : Full ROM

## 2019-11-14 ENCOUNTER — APPOINTMENT (OUTPATIENT)
Dept: SURGERY | Facility: AMBULATORY MEDICAL SERVICES | Age: 69
End: 2019-11-14
Payer: COMMERCIAL

## 2019-11-14 PROCEDURE — 55520 REMOVAL OF SPERM CORD LESION: CPT | Mod: 59,RT

## 2019-11-14 PROCEDURE — 49505 PRP I/HERN INIT REDUC >5 YR: CPT | Mod: RT

## 2019-11-26 PROBLEM — Z09 POSTOPERATIVE EXAMINATION: Status: ACTIVE | Noted: 2019-11-26

## 2019-11-27 ENCOUNTER — APPOINTMENT (OUTPATIENT)
Dept: SURGERY | Facility: CLINIC | Age: 69
End: 2019-11-27
Payer: COMMERCIAL

## 2019-11-27 VITALS
DIASTOLIC BLOOD PRESSURE: 70 MMHG | RESPIRATION RATE: 15 BRPM | SYSTOLIC BLOOD PRESSURE: 110 MMHG | OXYGEN SATURATION: 98 % | WEIGHT: 161 LBS | HEART RATE: 57 BPM | TEMPERATURE: 97.8 F | HEIGHT: 71 IN | BODY MASS INDEX: 22.54 KG/M2

## 2019-11-27 DIAGNOSIS — Z09 ENCOUNTER FOR FOLLOW-UP EXAMINATION AFTER COMPLETED TREATMENT FOR CONDITIONS OTHER THAN MALIGNANT NEOPLASM: ICD-10-CM

## 2019-11-27 DIAGNOSIS — Z98.890 OTHER SPECIFIED POSTPROCEDURAL STATES: ICD-10-CM

## 2019-11-27 PROCEDURE — 99024 POSTOP FOLLOW-UP VISIT: CPT

## 2019-11-27 RX ORDER — MESALAMINE 1.2 G/1
1.2 TABLET, DELAYED RELEASE ORAL
Refills: 0 | Status: DISCONTINUED | COMMUNITY
End: 2019-11-27

## 2019-11-27 RX ORDER — DOXYLAMINE SUCCINATE 25 MG
TABLET ORAL
Refills: 0 | Status: DISCONTINUED | COMMUNITY
End: 2019-11-27

## 2019-11-27 NOTE — PHYSICAL EXAM
[de-identified] : The inguinal hernia repair is intact. Surgical wound is clean dry and intact. The cord and testicle are normal.

## 2019-11-27 NOTE — HISTORY OF PRESENT ILLNESS
[de-identified] : Ry is a 70 y/o male here for post-operative visit. 11/14/19- right inguinal hernia repair with 1 x 4" Prolene mesh. The patient had no complaints.

## 2020-08-13 ENCOUNTER — APPOINTMENT (OUTPATIENT)
Dept: UROLOGY | Facility: CLINIC | Age: 70
End: 2020-08-13
Payer: COMMERCIAL

## 2020-08-13 VITALS
TEMPERATURE: 97.3 F | WEIGHT: 160 LBS | OXYGEN SATURATION: 99 % | BODY MASS INDEX: 22.4 KG/M2 | HEIGHT: 71 IN | DIASTOLIC BLOOD PRESSURE: 62 MMHG | HEART RATE: 53 BPM | SYSTOLIC BLOOD PRESSURE: 98 MMHG

## 2020-08-13 DIAGNOSIS — Z85.46 PERSONAL HISTORY OF MALIGNANT NEOPLASM OF PROSTATE: ICD-10-CM

## 2020-08-13 PROCEDURE — 99213 OFFICE O/P EST LOW 20 MIN: CPT

## 2020-08-13 RX ORDER — MESALAMINE 4 G/60ML
4 ENEMA RECTAL
Refills: 0 | Status: ACTIVE | COMMUNITY

## 2020-08-13 NOTE — ASSESSMENT
[FreeTextEntry1] : 68 year old male with erectile dysfunction after RALP.\par \par Patient reports initially had good erectile function with viagra 100 mg\par Subsequently has had several episodes without being able to maintain erections sufficient for SI.\par Of note is extensive bicycle riding. There does not seem to be any temporal relationship to his riding and change infunction.\par TYRONE 5\par Reviewed the concepts of cavernous nerve recovery and penile rehabilitation with the patient, including the rationale for a daily low-dose PDE5i which should be taken even if it does not help produce an erection, in addition to the need to achieve actual erections on a regular basis (regardless of whether they will be used for intercourse).  Discussed the fact that patients who do not respond to PDE5i alone usually supplement the daily pill with intracavernosal injections 1-3 x per week.  \par Discussed EVD as adjunct or alternative. \par Patient is not willing to proceed with IC\par Will institute daily sildenafil 20 mg\par Will start Viagra 100 mg BIW \par Warned re priapism risk and need for immediate intervention if erection lasts more than 2 hours.  Patient instructed to report to an emergency room and explicitly inform staff of his condition and need for treatment.\par \par \par 8.13.2020\par patient returns doing will on sildenafil 100 mg\par no side effects\par no medical contraindication to sildenafil\par satisfied with effectiveness\par \par he is s/p right inguinal hernia and now has a LEFT inguinal hernia.  followup scheduled with Dr Johnson.\par \par Last psa last year was undetectable.\par  repeat PSA today\par

## 2020-08-13 NOTE — PHYSICAL EXAM
[Abdomen Soft] : soft [Abdomen Tenderness] : non-tender [Costovertebral Angle Tenderness] : no ~M costovertebral angle tenderness [Urethral Meatus] : meatus normal [Penis Abnormality] : normal circumcised penis [Epididymis] : the epididymides were normal [Testes Tenderness] : no tenderness of the testes [FreeTextEntry1] : right inguinal scar; left inguinal hernia

## 2020-08-13 NOTE — HISTORY OF PRESENT ILLNESS
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

## 2020-08-14 ENCOUNTER — TRANSCRIPTION ENCOUNTER (OUTPATIENT)
Age: 70
End: 2020-08-14

## 2020-08-14 ENCOUNTER — APPOINTMENT (OUTPATIENT)
Dept: SURGERY | Facility: CLINIC | Age: 70
End: 2020-08-14
Payer: COMMERCIAL

## 2020-08-14 VITALS
HEART RATE: 55 BPM | TEMPERATURE: 97.2 F | WEIGHT: 164 LBS | DIASTOLIC BLOOD PRESSURE: 70 MMHG | HEIGHT: 71 IN | RESPIRATION RATE: 15 BRPM | BODY MASS INDEX: 22.96 KG/M2 | SYSTOLIC BLOOD PRESSURE: 112 MMHG | OXYGEN SATURATION: 99 %

## 2020-08-14 LAB
APPEARANCE: CLEAR
BACTERIA: NEGATIVE
BILIRUBIN URINE: NEGATIVE
BLOOD URINE: NEGATIVE
COLOR: NORMAL
GLUCOSE QUALITATIVE U: NEGATIVE
HYALINE CASTS: 0 /LPF
KETONES URINE: NEGATIVE
LEUKOCYTE ESTERASE URINE: NEGATIVE
MICROSCOPIC-UA: NORMAL
NITRITE URINE: NEGATIVE
PH URINE: 6.5
PROTEIN URINE: NEGATIVE
PSA SERPL-MCNC: <0.01 NG/ML
RED BLOOD CELLS URINE: 1 /HPF
SPECIFIC GRAVITY URINE: 1.01
SQUAMOUS EPITHELIAL CELLS: 0 /HPF
UROBILINOGEN URINE: NORMAL
WHITE BLOOD CELLS URINE: 0 /HPF

## 2020-08-14 PROCEDURE — 99214 OFFICE O/P EST MOD 30 MIN: CPT

## 2020-08-14 RX ORDER — SILDENAFIL 100 MG/1
100 TABLET, FILM COATED ORAL
Qty: 30 | Refills: 1 | Status: DISCONTINUED | COMMUNITY
Start: 2020-08-13 | End: 2020-08-14

## 2020-08-14 NOTE — PLAN
[FreeTextEntry1] : Advised elective mesh repair in ambulatory OR. Discussed with patient nature of, indications for and risks/benefits of surgery.

## 2020-08-14 NOTE — HISTORY OF PRESENT ILLNESS
[de-identified] : Ry is a 71 y/o male here for evaluation of left inguinal bulge. Patient is s/p right inguinal hernia repair with 1 x 4" Prolene mesh on 11/14/19.  [de-identified] : Status post right inguinal hernia repair about a year ago. Several weeks ago patient first noted a bulge in the left groin. Minimal local discomfort and no generalized abdominal symptoms or change in bowel habits. Bulge reduces with recumbency. Past history significant for previous robotic prostatectomy for cancer.

## 2020-08-14 NOTE — PHYSICAL EXAM
[de-identified] : Soft, nondistended, nontender. No palpable mass or organomegaly. Well-healed surgical scars at the umbilicus and in right groin. No hernia palpable at either side. Left groin-  moderate-sized, nontender, reducible inguinal hernia.

## 2020-09-24 ENCOUNTER — APPOINTMENT (OUTPATIENT)
Dept: SURGERY | Facility: AMBULATORY MEDICAL SERVICES | Age: 70
End: 2020-09-24
Payer: COMMERCIAL

## 2020-09-24 PROCEDURE — 49505 PRP I/HERN INIT REDUC >5 YR: CPT | Mod: LT

## 2020-09-24 PROCEDURE — 55520 REMOVAL OF SPERM CORD LESION: CPT | Mod: 59,LT

## 2020-10-07 ENCOUNTER — APPOINTMENT (OUTPATIENT)
Dept: SURGERY | Facility: CLINIC | Age: 70
End: 2020-10-07
Payer: COMMERCIAL

## 2020-10-07 VITALS
OXYGEN SATURATION: 99 % | TEMPERATURE: 97.4 F | SYSTOLIC BLOOD PRESSURE: 114 MMHG | DIASTOLIC BLOOD PRESSURE: 69 MMHG | HEART RATE: 65 BPM | RESPIRATION RATE: 16 BRPM

## 2020-10-07 DIAGNOSIS — K40.90 UNILATERAL INGUINAL HERNIA, W/OUT OBSTRUCTION OR GANGRENE, NOT SPECIFIED AS RECURRENT: ICD-10-CM

## 2020-10-07 PROCEDURE — 99024 POSTOP FOLLOW-UP VISIT: CPT

## 2020-10-07 NOTE — HISTORY OF PRESENT ILLNESS
[de-identified] : Ry is a 71 y/o male here for post-operative visit. 9/24/20- left inguinal hernia repair with Prolene mesh.  [de-identified] : Status post left inguinal hernia repair on 9/24/20. At present has only minimal residual discomfort at the incision.

## 2020-10-07 NOTE — PHYSICAL EXAM
[de-identified] : Soft, nondistended, nontender. Left groin incision healing well with prominent ridge. Repair fully intact. No palpable seroma or signs of infection.

## 2021-06-07 ENCOUNTER — NON-APPOINTMENT (OUTPATIENT)
Age: 71
End: 2021-06-07

## 2021-06-07 ENCOUNTER — APPOINTMENT (OUTPATIENT)
Dept: CARDIOLOGY | Facility: CLINIC | Age: 71
End: 2021-06-07
Payer: COMMERCIAL

## 2021-06-07 VITALS
HEART RATE: 56 BPM | WEIGHT: 168 LBS | BODY MASS INDEX: 23.52 KG/M2 | HEIGHT: 71 IN | DIASTOLIC BLOOD PRESSURE: 69 MMHG | OXYGEN SATURATION: 97 % | SYSTOLIC BLOOD PRESSURE: 109 MMHG | RESPIRATION RATE: 17 BRPM | TEMPERATURE: 97.9 F

## 2021-06-07 PROCEDURE — 99214 OFFICE O/P EST MOD 30 MIN: CPT

## 2021-06-07 PROCEDURE — 36415 COLL VENOUS BLD VENIPUNCTURE: CPT

## 2021-06-07 PROCEDURE — 93000 ELECTROCARDIOGRAM COMPLETE: CPT

## 2021-06-07 PROCEDURE — 99072 ADDL SUPL MATRL&STAF TM PHE: CPT

## 2021-06-08 NOTE — PHYSICAL EXAM
[General Appearance - Well Developed] : well developed [Normal Appearance] : normal appearance [Well Groomed] : well groomed [General Appearance - Well Nourished] : well nourished [No Deformities] : no deformities [General Appearance - In No Acute Distress] : no acute distress [Normal Oral Mucosa] : normal oral mucosa [No Oral Pallor] : no oral pallor [No Oral Cyanosis] : no oral cyanosis [Normal Oropharynx] : normal oropharynx [Normal Jugular Venous A Waves Present] : normal jugular venous A waves present [Normal Jugular Venous V Waves Present] : normal jugular venous V waves present [No Jugular Venous Black A Waves] : no jugular venous black A waves [] : no respiratory distress [Respiration, Rhythm And Depth] : normal respiratory rhythm and effort [Exaggerated Use Of Accessory Muscles For Inspiration] : no accessory muscle use [Auscultation Breath Sounds / Voice Sounds] : lungs were clear to auscultation bilaterally [Bowel Sounds] : normal bowel sounds [Abdomen Soft] : soft [Abdomen Tenderness] : non-tender [Abnormal Walk] : normal gait [Gait - Sufficient For Exercise Testing] : the gait was sufficient for exercise testing [Nail Clubbing] : no clubbing of the fingernails [Cyanosis, Localized] : no localized cyanosis [Skin Color & Pigmentation] : normal skin color and pigmentation [No Venous Stasis] : no venous stasis [No Xanthoma] : no  xanthoma was observed [Oriented To Time, Place, And Person] : oriented to person, place, and time [Impaired Insight] : insight and judgment were intact [Affect] : the affect was normal [Mood] : the mood was normal [No Anxiety] : not feeling anxious [Conjunctiva] : the conjunctiva were normal in both eyes [PERRL] : pupils were equal in size, round, and reactive to light [EOM Intact] : extraocular movements were intact [5th Left ICS - MCL] : palpated at the 5th LICS in the midclavicular line [Normal] : normal [No Precordial Heave] : no precordial heave was noted [Bradycardia] : bradycardic [Rhythm Regular] : regular [Normal S1] : normal S1 [Normal S2] : normal S2 [No Gallop] : no gallop heard [No Murmur] : no murmurs heard [2+] : left 2+ [No Abnormalities] : the abdominal aorta was not enlarged and no bruit was heard [No Pitting Edema] : no pitting edema present [Right Carotid Bruit] : no bruit heard over the right carotid [Left Carotid Bruit] : no bruit heard over the left carotid

## 2021-06-08 NOTE — DISCUSSION/SUMMARY
[FreeTextEntry1] : Very pleasant 71 year-old gentleman with inflammatory bowel disease (ulcerative colitis) presents today for follow-up. His EKG shows sinus bradycardia consistent with his conditioning (he rides more than 20 miles on a bike several times per week), but there are diffuse nonspecific ST-T changes, which are his baseline.\par \par Follow-up routine labs. \par Encouraged to minimize use of Ambien. Given seasonal allergies, consider low dose antihistamine as alternative.

## 2021-06-08 NOTE — CARDIOLOGY SUMMARY
[de-identified] : 4/28/2017, sinus bradycardia at 51 bpm, diffuse ST-T abnormality (nonspecific)  [de-identified] : 5/23/2017, mild LV remodeling (increased wall thickness with normal LV mass), normal LV function, no pulmonary hypertension, normal LA size, mild mitral regurgitation LVEF 60-65%.

## 2021-06-08 NOTE — REASON FOR VISIT
[FreeTextEntry3] : BINH Valencia [phone] [FreeTextEntry1] : June 201 - Patient returns today in his usual state of health. This is his first visit since 2019. He spent June-September in [place], but he spent the rest of the pandemic in [place], [place]. He and his wife drove back and forth. \par He continues to exercise regularly, including riding his bike and doing some resistance training at the gym (more so in [place] than in [place]). He also plays golf.\par He received both doses of Covid-19 vaccine in [place] (January and February).

## 2021-06-08 NOTE — HISTORY OF PRESENT ILLNESS
[FreeTextEntry1] : Patient presents today for follow-up. He is in his usual state of health. His erectile dysfunction had resolved without intervention. More recently, however, he noted an elevated PSA and 4K score. He is now status post robotic prostatectomy. He has urinary continence but needs Viagra for sexual function. He has resumed exercise after prostatectomy. He is exercising at approximately 80% of his prior intensity. He favors bike riding for aerobic exercise.\par \par Patient is up to date with colonoscopy, having had one in 2017. He follows with Dr. Dinero.\par \par PMD: BINH Valencia [phone]\par GI: Brent Dinero MD [phone]\par Urologist: Jere Duncan MD [phone] and Carlin Mckeon MD [phone]

## 2021-06-10 LAB
25(OH)D3 SERPL-MCNC: 81.5 NG/ML
ALBUMIN SERPL ELPH-MCNC: 4.3 G/DL
ALP BLD-CCNC: 90 U/L
ALT SERPL-CCNC: 10 U/L
ANION GAP SERPL CALC-SCNC: 18 MMOL/L
AST SERPL-CCNC: 18 U/L
BASOPHILS # BLD AUTO: 0.03 K/UL
BASOPHILS NFR BLD AUTO: 0.5 %
BILIRUB SERPL-MCNC: 0.2 MG/DL
BUN SERPL-MCNC: 24 MG/DL
CALCIUM SERPL-MCNC: 9.2 MG/DL
CHLORIDE SERPL-SCNC: 105 MMOL/L
CHOLEST SERPL-MCNC: 186 MG/DL
CO2 SERPL-SCNC: 17 MMOL/L
COVID-19 NUCLEOCAPSID  GAM ANTIBODY INTERPRETATION: NEGATIVE
COVID-19 SPIKE DOMAIN ANTIBODY INTERPRETATION: POSITIVE
CREAT SERPL-MCNC: 1.31 MG/DL
EOSINOPHIL # BLD AUTO: 0.14 K/UL
EOSINOPHIL NFR BLD AUTO: 2.4 %
ESTIMATED AVERAGE GLUCOSE: 114 MG/DL
GLUCOSE SERPL-MCNC: 67 MG/DL
HBA1C MFR BLD HPLC: 5.6 %
HCT VFR BLD CALC: 45.1 %
HDLC SERPL-MCNC: 42 MG/DL
HGB BLD-MCNC: 15.3 G/DL
IMM GRANULOCYTES NFR BLD AUTO: 0.2 %
LDLC SERPL CALC-MCNC: 126 MG/DL
LDLC SERPL DIRECT ASSAY-MCNC: 124 MG/DL
LYMPHOCYTES # BLD AUTO: 1.6 K/UL
LYMPHOCYTES NFR BLD AUTO: 27.1 %
MAN DIFF?: NORMAL
MCHC RBC-ENTMCNC: 33.8 PG
MCHC RBC-ENTMCNC: 33.9 GM/DL
MCV RBC AUTO: 99.8 FL
MONOCYTES # BLD AUTO: 0.62 K/UL
MONOCYTES NFR BLD AUTO: 10.5 %
NEUTROPHILS # BLD AUTO: 3.51 K/UL
NEUTROPHILS NFR BLD AUTO: 59.3 %
NONHDLC SERPL-MCNC: 144 MG/DL
PLATELET # BLD AUTO: 191 K/UL
POTASSIUM SERPL-SCNC: 4.6 MMOL/L
PROT SERPL-MCNC: 6.6 G/DL
PSA FREE FLD-MCNC: NORMAL %
PSA FREE SERPL-MCNC: <0.01 NG/ML
PSA SERPL-MCNC: 0.03 NG/ML
RBC # BLD: 4.52 M/UL
RBC # FLD: 13.2 %
SARS-COV-2 AB SERPL IA-ACNC: >250 U/ML
SARS-COV-2 AB SERPL QL IA: 0.09 INDEX
SODIUM SERPL-SCNC: 140 MMOL/L
TRIGL SERPL-MCNC: 88 MG/DL
TSH SERPL-ACNC: 3.87 UIU/ML
WBC # FLD AUTO: 5.91 K/UL

## 2021-07-30 ENCOUNTER — NON-APPOINTMENT (OUTPATIENT)
Age: 71
End: 2021-07-30

## 2021-10-13 ENCOUNTER — APPOINTMENT (OUTPATIENT)
Dept: ORTHOPEDIC SURGERY | Facility: CLINIC | Age: 71
End: 2021-10-13
Payer: COMMERCIAL

## 2021-10-13 VITALS
WEIGHT: 168 LBS | HEART RATE: 47 BPM | BODY MASS INDEX: 23.52 KG/M2 | SYSTOLIC BLOOD PRESSURE: 119 MMHG | HEIGHT: 71 IN | DIASTOLIC BLOOD PRESSURE: 72 MMHG

## 2021-10-13 DIAGNOSIS — M17.0 BILATERAL PRIMARY OSTEOARTHRITIS OF KNEE: ICD-10-CM

## 2021-10-13 DIAGNOSIS — M17.10 UNILATERAL PRIMARY OSTEOARTHRITIS, UNSPECIFIED KNEE: ICD-10-CM

## 2021-10-13 PROCEDURE — 99204 OFFICE O/P NEW MOD 45 MIN: CPT | Mod: 25

## 2021-10-13 PROCEDURE — 20610 DRAIN/INJ JOINT/BURSA W/O US: CPT | Mod: LT

## 2021-10-13 PROCEDURE — 73562 X-RAY EXAM OF KNEE 3: CPT | Mod: 50

## 2021-10-29 ENCOUNTER — APPOINTMENT (OUTPATIENT)
Dept: UROLOGY | Facility: CLINIC | Age: 71
End: 2021-10-29

## 2021-11-03 ENCOUNTER — APPOINTMENT (OUTPATIENT)
Dept: UROLOGY | Facility: CLINIC | Age: 71
End: 2021-11-03
Payer: COMMERCIAL

## 2021-11-03 DIAGNOSIS — N52.31 ERECTILE DYSFUNCTION FOLLOWING RADICAL PROSTATECTOMY: ICD-10-CM

## 2021-11-03 DIAGNOSIS — Z85.46 PERSONAL HISTORY OF MALIGNANT NEOPLASM OF PROSTATE: ICD-10-CM

## 2021-11-03 DIAGNOSIS — R97.20 ELEVATED PROSTATE, SPECIFIC ANTIGEN [PSA]: ICD-10-CM

## 2021-11-03 PROCEDURE — 99214 OFFICE O/P EST MOD 30 MIN: CPT | Mod: 95

## 2021-11-03 PROCEDURE — 99204 OFFICE O/P NEW MOD 45 MIN: CPT | Mod: 95

## 2021-11-03 RX ORDER — SILDENAFIL 100 MG/1
100 TABLET, FILM COATED ORAL
Qty: 30 | Refills: 1 | Status: ACTIVE | COMMUNITY
Start: 2021-11-03 | End: 1900-01-01

## 2021-11-03 NOTE — HISTORY OF PRESENT ILLNESS
[Home] : at home, [unfilled] , at the time of the visit. 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  The patient's identity has been confirmed.  The patient was previously emailed a copy of the telemedicine consent.  They have had a chance to review and has now given verbal consent and has requested care to be assessed and treated through telemedicine and understands there maybe limitations in this process and they may need further follow up care in the office and or hospital settings.   \par THE FOLLOWING WAS READ TO AND CONSENTED TO BY PATIENT\par By virtue of my participation in this telehealth visit, I am consenting to receive care through telehealth. Telehealth is the use of electronic information and communication technologies by providers to deliver health care to patients at a distance. \par I understand that any care provided to me through [facility]’s telehealth application (“the [employer] laron”) will incorporate security protocols to protect the privacy and security of my health information. If any other application is used to provide care to me, I understand that the technology may not contain appropriate security protocols to protect the privacy and security of my health information. My provider has explained to me the risks associated with the technology platforms that he or she is using to provide care to me. I acknowledge that there are potential risks associated with any technology used while obtaining care through telehealth, including, but not limited to, connectively interruptions, other technical difficulties, and unauthorized access by a third party to one’s health information. Despite these risks, I agree to participate in the telehealth encounter. \par I understand and agree that I or my healthcare provider may terminate a telehealth encounter at any time in the event of a technical malfunction. \par I also understand that my location determines where medicine is being practiced. As a result, I will inform my provider where I am located at the time of my telehealth visit. \par I understand that there may be costs associated with a telehealth visit. I agree that I am responsible for any fees associated with the telehealth services that I receive. \par This Informed Consent for Telehealth Services During a Public Health Emergency will remain in effect solely during the term of the public health emergency.\par \par Verbal consent given on Nov 03, 2021  11:30AM by Iván Armenta from Mr. DENNIS COGAN \par  \par \par

## 2021-11-03 NOTE — REASON FOR VISIT
[FreeTextEntry1] : RS- neg\par TC SENT OUT WILL TREAT IF POSITIVE\par ADVISED SX TX, FLUIDS FEVER CONTROL\par F/U IF FEVER PERSIST OR SX WORSEN\par \par disc most likely Coxsackie virus, sx tx , fluids advised
[FreeTextEntry1] : RS- neg\par TC SENT OUT WILL TREAT IF POSITIVE\par ADVISED SX TX, FLUIDS FEVER CONTROL\par F/U IF FEVER PERSIST OR SX WORSEN\par \par disc most likely Coxsackie virus, sx tx , fluids advised
[Follow-up Visit ___] : a follow-up visit  for [unfilled]

## 2021-12-08 NOTE — H&P PST ADULT - ENERGY EXPENDITURE (METS)
[facility] Home Exercise Prescription      Exercise Plan:  It is very beneficial for you to exercise at home on a regular basis.  The American Heart Association suggests at least 150 minutes per week of moderate exercise.  If you are interested in losing weight, 300 minutes or more per week of moderate exercise may be necessary.  Below is a list of guidelines for you to follow when exercising.     Warm-Up  The exercise session should always begin with at least 2-5  minutes of warming up.  This means starting out the aerobic exercise phase at a slow pace, gradually increasing the speed or intensity.     Aerobic Exercise  This phase consists of continuous, purposeful activity using larger muscle groups to elevate heart rate to a goal range.  [facility] exercise settings.    Mode of Exercise: Treadmill,Arm ergometer,Recumbent bike,Free weights,Walking,Recumbent stepper  Frequency of Exercise: 1-3 days/week  Duration of Aerobic Exercise: 16-30 minutes  Intensity of Exercise (METS) - Current: 2.7  Intensity Level - Rate of Perceived: Somewhat hard to hard  Target Heart Rate Range: 50 bpm above baseline  Strength: 1 to 3 sets of 10 to 15 repetitions,Free weight  Progression: Duration will be advanced by 1-10 minutes every week, up to 60 minutes,Weights will be advanced 2-15 pounds, once 10-15 repetitions can be lifted comfortably,METs will be advanced by 0.5-1.0 every 2 weeks    What is a MET?  Metabolic Equivalency Table: Activities are categorized by the amount of energy it takes to perform them.  This energy level is measured in \"METs\".  One MET equals the amount of energy your body uses at rest and increases as you activity level increases.  For example: Sitting in a chair is 1 MET, doing laundry is 3 METs, 3mph/2% incline on a treadmill is 4 METs.        Cool-Down  The exercise session should always end with at least 2-5  minutes of cooling down. This means ending the aerobic exercise at  a slow pace, gradually decreasing speed or intensity.  This should be followed by stretching to increase flexibility.  Hold each stretch for 15-30 seconds.     Always listen to your body!  During exercise, if you experience any of the following Signs and Symptoms STOP exercising and alert your physician:  Chest discomfort (that may spread to other areas of the body), lightheadedness/dizziness, unusual shortness of breath, unusual fatigue, excessive sweating, or nausea/vomiting.     Special Weather Considerations:  Avoid exercise outdoors if the temperature is less than 20 degrees or more than 80 degrees.  Remember to factor in wind chills and heat indexes. Also, stay hydrated and drink water.    [facility] Life Style Changes    Goals identified on first session visit:  Increase endurance  Increase muscle strength  Return to daily and/or recreational activities  Control diabetes    Tips to help you succeed with Lifestyle Change:  1. Take it one change at a time:  Do not tackle all your goals at once. Rank your risk factors and then decided which to change first.  2. Set realistic goals-set your long term goals and divide each long term goal into a short term goal.  3. Expect set-backs:  Make a plan to get back on track. A journal or log may help you stick to your plan.     Heart Healthy Diet Tips:  1. Eat a variety of foods every day. Include choices from all of the food groups.  2. Eat high-fiber foods such as fresh fruits, fresh vegetables, and whole grains.  3. Choose low-fat or reduced fat products and lean cut meats.   4. Control your portions and make healthy choices more often.  5. Read food labels and checking for serving size.  6. Try not to skip meals.  This may cause you to overeat at the next meal.    You may be receiving a [facility] Survey via mail, email or text. Please consider taking the time to complete it as your feedback is important to us in order to make improvements in our program. We  always strive to provide very good care.             5

## 2021-12-14 ENCOUNTER — TRANSCRIPTION ENCOUNTER (OUTPATIENT)
Age: 71
End: 2021-12-14

## 2022-06-06 ENCOUNTER — NON-APPOINTMENT (OUTPATIENT)
Age: 72
End: 2022-06-06

## 2022-06-06 ENCOUNTER — APPOINTMENT (OUTPATIENT)
Dept: CARDIOLOGY | Facility: CLINIC | Age: 72
End: 2022-06-06
Payer: COMMERCIAL

## 2022-06-06 VITALS
HEIGHT: 71 IN | RESPIRATION RATE: 17 BRPM | HEART RATE: 52 BPM | WEIGHT: 160 LBS | DIASTOLIC BLOOD PRESSURE: 73 MMHG | SYSTOLIC BLOOD PRESSURE: 118 MMHG | OXYGEN SATURATION: 98 % | BODY MASS INDEX: 22.4 KG/M2

## 2022-06-06 DIAGNOSIS — Z11.59 ENCOUNTER FOR SCREENING FOR OTHER VIRAL DISEASES: ICD-10-CM

## 2022-06-06 PROCEDURE — 93000 ELECTROCARDIOGRAM COMPLETE: CPT

## 2022-06-06 PROCEDURE — 99214 OFFICE O/P EST MOD 30 MIN: CPT

## 2022-06-06 PROCEDURE — 36415 COLL VENOUS BLD VENIPUNCTURE: CPT

## 2022-06-07 LAB
25(OH)D3 SERPL-MCNC: 80.3 NG/ML
ALBUMIN SERPL ELPH-MCNC: 4.3 G/DL
ALP BLD-CCNC: 89 U/L
ALT SERPL-CCNC: 13 U/L
ANION GAP SERPL CALC-SCNC: 12 MMOL/L
AST SERPL-CCNC: 20 U/L
BASOPHILS # BLD AUTO: 0.03 K/UL
BASOPHILS NFR BLD AUTO: 0.5 %
BILIRUB SERPL-MCNC: 0.2 MG/DL
BUN SERPL-MCNC: 26 MG/DL
CALCIUM SERPL-MCNC: 9 MG/DL
CHLORIDE SERPL-SCNC: 105 MMOL/L
CHOLEST SERPL-MCNC: 215 MG/DL
CO2 SERPL-SCNC: 22 MMOL/L
COVID-19 NUCLEOCAPSID  GAM ANTIBODY INTERPRETATION: NEGATIVE
COVID-19 SPIKE DOMAIN ANTIBODY INTERPRETATION: POSITIVE
CREAT SERPL-MCNC: 1.24 MG/DL
EGFR: 62 ML/MIN/1.73M2
EOSINOPHIL # BLD AUTO: 0.09 K/UL
EOSINOPHIL NFR BLD AUTO: 1.4 %
ESTIMATED AVERAGE GLUCOSE: 117 MG/DL
GLUCOSE SERPL-MCNC: 84 MG/DL
HBA1C MFR BLD HPLC: 5.7 %
HCT VFR BLD CALC: 46.9 %
HDLC SERPL-MCNC: 41 MG/DL
HGB BLD-MCNC: 15.4 G/DL
IMM GRANULOCYTES NFR BLD AUTO: 0.2 %
LDLC SERPL CALC-MCNC: 150 MG/DL
LDLC SERPL DIRECT ASSAY-MCNC: 148 MG/DL
LYMPHOCYTES # BLD AUTO: 1.58 K/UL
LYMPHOCYTES NFR BLD AUTO: 24.2 %
MAN DIFF?: NORMAL
MCHC RBC-ENTMCNC: 32.8 GM/DL
MCHC RBC-ENTMCNC: 33.6 PG
MCV RBC AUTO: 102.4 FL
MONOCYTES # BLD AUTO: 0.52 K/UL
MONOCYTES NFR BLD AUTO: 8 %
NEUTROPHILS # BLD AUTO: 4.29 K/UL
NEUTROPHILS NFR BLD AUTO: 65.7 %
NONHDLC SERPL-MCNC: 173 MG/DL
PLATELET # BLD AUTO: 190 K/UL
POTASSIUM SERPL-SCNC: 4.8 MMOL/L
PROT SERPL-MCNC: 6.4 G/DL
RBC # BLD: 4.58 M/UL
RBC # FLD: 13 %
SARS-COV-2 AB SERPL IA-ACNC: 178 U/ML
SARS-COV-2 AB SERPL QL IA: 0.08 INDEX
SODIUM SERPL-SCNC: 138 MMOL/L
TRIGL SERPL-MCNC: 118 MG/DL
TSH SERPL-ACNC: 2.77 UIU/ML
WBC # FLD AUTO: 6.52 K/UL

## 2022-06-15 NOTE — CARDIOLOGY SUMMARY
[de-identified] : 4/28/2017, sinus bradycardia at 51 bpm, diffuse ST-T abnormality (nonspecific)  [de-identified] : 5/23/2017, mild LV remodeling (increased wall thickness with normal LV mass), normal LV function, no pulmonary hypertension, normal LA size, mild mitral regurgitation LVEF 60-65%.

## 2022-06-15 NOTE — REASON FOR VISIT
[FreeTextEntry3] : BINH Valencia [phone] [FreeTextEntry1] : June 2022 - Patient returns today for follow-up in his usual state of health. He spent November -May in [place], [place].\par He continues to exercise regularly, including riding his bike and doing some resistance training at the gym. He also plays golf.\par He received both doses of Covid-19 vaccine in [place] (January and February 2021) and a Moderna booster sometime after that. He is considering a second booster.

## 2022-06-15 NOTE — DISCUSSION/SUMMARY
[FreeTextEntry1] : Very pleasant 72 year-old gentleman with inflammatory bowel disease (ulcerative colitis) presents today for follow-up. His EKG shows sinus bradycardia consistent with his conditioning (he rides more than 20 miles on a bike several times per week).\par \par Follow-up routine labs. \par Patient has stopped using Ambien. Given seasonal allergies, he takes the occasional Zyrtec at night. \par \par He has been prescribed medical marijuana that also helps him sleep.

## 2022-06-15 NOTE — HISTORY OF PRESENT ILLNESS
[FreeTextEntry1] : Patient presents today for follow-up. He is in his usual state of health. His erectile dysfunction had resolved without intervention. More recently, however, he noted an elevated PSA and 4K score. He is now status post robotic prostatectomy. He has urinary continence but needs Viagra for sexual function. He has resumed exercise after prostatectomy. He is exercising at approximately 80% of his prior intensity. He favors bike riding for aerobic exercise.\par \par Patient is up to date with colonoscopy, having had one in 2017. He follows with Dr. Dinero.\par \par June 201 - Patient returns today in his usual state of health. This is his first visit since 2019. He spent June-September in [place], but he spent the rest of the pandemic in [place], [place]. He and his wife drove back and forth. \par He continues to exercise regularly, including riding his bike and doing some resistance training at the gym (more so in [place] than in [place]). He also plays golf.\par He received both doses of Covid-19 vaccine in [place] (January and February).\par \par PMD: BINH Valencia [phone]\par GI: Brent Dinero MD [phone]\par Urologist: Jere Duncan MD [phone] and Carlin Mckeon MD [phone]

## 2022-07-18 ENCOUNTER — TRANSCRIPTION ENCOUNTER (OUTPATIENT)
Age: 72
End: 2022-07-18

## 2022-08-11 ENCOUNTER — APPOINTMENT (OUTPATIENT)
Dept: CARDIOLOGY | Facility: CLINIC | Age: 72
End: 2022-08-11

## 2022-08-11 VITALS
DIASTOLIC BLOOD PRESSURE: 64 MMHG | HEART RATE: 54 BPM | WEIGHT: 162 LBS | BODY MASS INDEX: 22.59 KG/M2 | SYSTOLIC BLOOD PRESSURE: 120 MMHG | OXYGEN SATURATION: 98 %

## 2022-08-11 PROCEDURE — 99214 OFFICE O/P EST MOD 30 MIN: CPT

## 2022-08-16 NOTE — HISTORY OF PRESENT ILLNESS
[FreeTextEntry1] : Patient presents today for follow-up. He is in his usual state of health. His erectile dysfunction had resolved without intervention. More recently, however, he noted an elevated PSA and 4K score. He is now status post robotic prostatectomy. He has urinary continence but needs Viagra for sexual function. He has resumed exercise after prostatectomy. He is exercising at approximately 80% of his prior intensity. He favors bike riding for aerobic exercise.\par \par Patient is up to date with colonoscopy, having had one in 2017. He follows with Dr. Dinero.\par \par June 201 - Patient returns today in his usual state of health. This is his first visit since 2019. He spent June-September in [place], but he spent the rest of the pandemic in [place], [place]. He and his wife drove back and forth. \par He continues to exercise regularly, including riding his bike and doing some resistance training at the gym (more so in [place] than in [place]). He also plays golf.\par He received both doses of Covid-19 vaccine in [place] (January and February).\par \par June 2022 - Patient returns today for follow-up in his usual state of health. He spent November -May in [place], [place].\par He continues to exercise regularly, including riding his bike and doing some resistance training at the gym. He also plays golf.\par He received both doses of Covid-19 vaccine in [place] (January and February 2021) and a Moderna booster sometime after that. He is considering a second booster.\par \par PMD: BINH Valencia [phone]\par GI: Brent Dinero MD [phone]\par Urologist: Jere Duncan MD [phone] and Carlin Mckeon MD [phone]

## 2022-08-16 NOTE — CARDIOLOGY SUMMARY
[de-identified] : 4/28/2017, sinus bradycardia at 51 bpm, diffuse ST-T abnormality (nonspecific)  [de-identified] : 5/23/2017, mild LV remodeling (increased wall thickness with normal LV mass), normal LV function, no pulmonary hypertension, normal LA size, mild mitral regurgitation LVEF 60-65%.

## 2022-08-16 NOTE — REASON FOR VISIT
[FreeTextEntry3] : BINH Valencia [phone] [FreeTextEntry1] : August 2022 - Patient returns today for follow-up. He is having worsening difficulty with falling asleep. He  tries to maintain consistent sleep hygiene, including reading before going to bed. He attributes the worsening of the problem to a recent trip to [place] that threw him off. He had been using Benadryl and a medical marijuana gummy, and that was working, until it stopped. Several weeks ago, he started taking Ambien 5 mg PRN, and he has found that very effective.\par He continues to exercise regularly, usually in the morning.\par He does not have any caffeine intake.

## 2022-08-24 DIAGNOSIS — U07.1 COVID-19: ICD-10-CM

## 2022-09-16 ENCOUNTER — EMERGENCY (EMERGENCY)
Facility: HOSPITAL | Age: 72
LOS: 1 days | Discharge: HOME | End: 2022-09-16
Attending: STUDENT IN AN ORGANIZED HEALTH CARE EDUCATION/TRAINING PROGRAM
Payer: COMMERCIAL

## 2022-09-16 VITALS
RESPIRATION RATE: 18 BRPM | DIASTOLIC BLOOD PRESSURE: 72 MMHG | TEMPERATURE: 98 F | HEART RATE: 64 BPM | SYSTOLIC BLOOD PRESSURE: 142 MMHG | OXYGEN SATURATION: 100 %

## 2022-09-16 DIAGNOSIS — Z90.89 ACQUIRED ABSENCE OF OTHER ORGANS: Chronic | ICD-10-CM

## 2022-09-16 LAB
ALBUMIN SERPL ELPH-MCNC: 4.1 G/DL — SIGNIFICANT CHANGE UP (ref 3.3–5)
ALP SERPL-CCNC: 98 U/L — SIGNIFICANT CHANGE UP (ref 40–120)
ALT FLD-CCNC: 12 U/L — SIGNIFICANT CHANGE UP (ref 10–45)
ANION GAP SERPL CALC-SCNC: 9 MMOL/L — SIGNIFICANT CHANGE UP (ref 5–17)
APTT BLD: 27.3 SEC — LOW (ref 27.5–35.5)
AST SERPL-CCNC: 19 U/L — SIGNIFICANT CHANGE UP (ref 10–40)
BASOPHILS # BLD AUTO: 0.02 K/UL — SIGNIFICANT CHANGE UP (ref 0–0.2)
BASOPHILS NFR BLD AUTO: 0.4 % — SIGNIFICANT CHANGE UP (ref 0–2)
BILIRUB SERPL-MCNC: 0.2 MG/DL — SIGNIFICANT CHANGE UP (ref 0.2–1.2)
BUN SERPL-MCNC: 25 MG/DL — HIGH (ref 7–23)
CALCIUM SERPL-MCNC: 9.1 MG/DL — SIGNIFICANT CHANGE UP (ref 8.4–10.5)
CHLORIDE SERPL-SCNC: 108 MMOL/L — SIGNIFICANT CHANGE UP (ref 96–108)
CO2 SERPL-SCNC: 22 MMOL/L — SIGNIFICANT CHANGE UP (ref 22–31)
CREAT SERPL-MCNC: 1.32 MG/DL — HIGH (ref 0.5–1.3)
EGFR: 57 ML/MIN/1.73M2 — LOW
EOSINOPHIL # BLD AUTO: 0.16 K/UL — SIGNIFICANT CHANGE UP (ref 0–0.5)
EOSINOPHIL NFR BLD AUTO: 2.9 % — SIGNIFICANT CHANGE UP (ref 0–6)
GLUCOSE SERPL-MCNC: 143 MG/DL — HIGH (ref 70–99)
HCT VFR BLD CALC: 43.3 % — SIGNIFICANT CHANGE UP (ref 39–50)
HGB BLD-MCNC: 14.7 G/DL — SIGNIFICANT CHANGE UP (ref 13–17)
IMM GRANULOCYTES NFR BLD AUTO: 0.4 % — SIGNIFICANT CHANGE UP (ref 0–0.9)
INR BLD: 1.09 RATIO — SIGNIFICANT CHANGE UP (ref 0.88–1.16)
LYMPHOCYTES # BLD AUTO: 1.74 K/UL — SIGNIFICANT CHANGE UP (ref 1–3.3)
LYMPHOCYTES # BLD AUTO: 31.7 % — SIGNIFICANT CHANGE UP (ref 13–44)
MCHC RBC-ENTMCNC: 33.9 GM/DL — SIGNIFICANT CHANGE UP (ref 32–36)
MCHC RBC-ENTMCNC: 34.2 PG — HIGH (ref 27–34)
MCV RBC AUTO: 100.7 FL — HIGH (ref 80–100)
MONOCYTES # BLD AUTO: 0.62 K/UL — SIGNIFICANT CHANGE UP (ref 0–0.9)
MONOCYTES NFR BLD AUTO: 11.3 % — SIGNIFICANT CHANGE UP (ref 2–14)
NEUTROPHILS # BLD AUTO: 2.93 K/UL — SIGNIFICANT CHANGE UP (ref 1.8–7.4)
NEUTROPHILS NFR BLD AUTO: 53.3 % — SIGNIFICANT CHANGE UP (ref 43–77)
NRBC # BLD: 0 /100 WBCS — SIGNIFICANT CHANGE UP (ref 0–0)
PLATELET # BLD AUTO: 173 K/UL — SIGNIFICANT CHANGE UP (ref 150–400)
POTASSIUM SERPL-MCNC: 4.4 MMOL/L — SIGNIFICANT CHANGE UP (ref 3.5–5.3)
POTASSIUM SERPL-SCNC: 4.4 MMOL/L — SIGNIFICANT CHANGE UP (ref 3.5–5.3)
PROT SERPL-MCNC: 6.4 G/DL — SIGNIFICANT CHANGE UP (ref 6–8.3)
PROTHROM AB SERPL-ACNC: 12.6 SEC — SIGNIFICANT CHANGE UP (ref 10.5–13.4)
RBC # BLD: 4.3 M/UL — SIGNIFICANT CHANGE UP (ref 4.2–5.8)
RBC # FLD: 12.7 % — SIGNIFICANT CHANGE UP (ref 10.3–14.5)
SARS-COV-2 RNA SPEC QL NAA+PROBE: DETECTED
SODIUM SERPL-SCNC: 139 MMOL/L — SIGNIFICANT CHANGE UP (ref 135–145)
TROPONIN T, HIGH SENSITIVITY RESULT: <6 NG/L — SIGNIFICANT CHANGE UP (ref 0–51)
WBC # BLD: 5.49 K/UL — SIGNIFICANT CHANGE UP (ref 3.8–10.5)
WBC # FLD AUTO: 5.49 K/UL — SIGNIFICANT CHANGE UP (ref 3.8–10.5)

## 2022-09-16 PROCEDURE — 70496 CT ANGIOGRAPHY HEAD: CPT | Mod: 26,MA

## 2022-09-16 PROCEDURE — 70498 CT ANGIOGRAPHY NECK: CPT | Mod: 26,MA

## 2022-09-16 PROCEDURE — 99291 CRITICAL CARE FIRST HOUR: CPT

## 2022-09-16 PROCEDURE — 93010 ELECTROCARDIOGRAM REPORT: CPT

## 2022-09-16 RX ORDER — ATORVASTATIN CALCIUM 80 MG/1
80 TABLET, FILM COATED ORAL AT BEDTIME
Refills: 0 | Status: DISCONTINUED | OUTPATIENT
Start: 2022-09-16 | End: 2022-09-20

## 2022-09-16 RX ORDER — ASPIRIN/CALCIUM CARB/MAGNESIUM 324 MG
81 TABLET ORAL DAILY
Refills: 0 | Status: DISCONTINUED | OUTPATIENT
Start: 2022-09-16 | End: 2022-09-16

## 2022-09-16 RX ORDER — CLOPIDOGREL BISULFATE 75 MG/1
300 TABLET, FILM COATED ORAL ONCE
Refills: 0 | Status: COMPLETED | OUTPATIENT
Start: 2022-09-16 | End: 2022-09-16

## 2022-09-16 RX ORDER — ACETAMINOPHEN 500 MG
975 TABLET ORAL ONCE
Refills: 0 | Status: DISCONTINUED | OUTPATIENT
Start: 2022-09-16 | End: 2022-09-16

## 2022-09-16 RX ORDER — ASPIRIN/CALCIUM CARB/MAGNESIUM 324 MG
81 TABLET ORAL DAILY
Refills: 0 | Status: DISCONTINUED | OUTPATIENT
Start: 2022-09-16 | End: 2022-09-20

## 2022-09-16 RX ORDER — ASPIRIN/CALCIUM CARB/MAGNESIUM 324 MG
81 TABLET ORAL ONCE
Refills: 0 | Status: COMPLETED | OUTPATIENT
Start: 2022-09-16 | End: 2022-09-16

## 2022-09-16 RX ORDER — ACETAMINOPHEN 500 MG
650 TABLET ORAL ONCE
Refills: 0 | Status: COMPLETED | OUTPATIENT
Start: 2022-09-16 | End: 2022-09-16

## 2022-09-16 RX ORDER — LANOLIN ALCOHOL/MO/W.PET/CERES
3 CREAM (GRAM) TOPICAL ONCE
Refills: 0 | Status: COMPLETED | OUTPATIENT
Start: 2022-09-16 | End: 2022-09-16

## 2022-09-16 RX ADMIN — Medication 650 MILLIGRAM(S): at 22:11

## 2022-09-16 RX ADMIN — Medication 81 MILLIGRAM(S): at 16:44

## 2022-09-16 RX ADMIN — Medication 650 MILLIGRAM(S): at 22:00

## 2022-09-16 RX ADMIN — CLOPIDOGREL BISULFATE 300 MILLIGRAM(S): 75 TABLET, FILM COATED ORAL at 16:44

## 2022-09-16 RX ADMIN — Medication 3 MILLIGRAM(S): at 21:51

## 2022-09-16 RX ADMIN — ATORVASTATIN CALCIUM 80 MILLIGRAM(S): 80 TABLET, FILM COATED ORAL at 21:51

## 2022-09-16 NOTE — ED CDU PROVIDER INITIAL DAY NOTE - OBJECTIVE STATEMENT
72 y M with pmhx of colitis on mesalamine, hx of basal and squamous cancer  presenting as code stroke. Around 2:30/2:45 pm patient was speaking with wife when all of a sudden he felt like his R arm and R leg were falling asleep and couldn't move his right arm. Pt reports he couldn't speak, he could only make noises. Per wife he kept pointing to his right hand and garbled. She estimates it lasted 1-2 min and that he was back to normal by the time she called for help. Had covid 3 weeks ago for the first known time, but has improved from that stand point. No known rhinorrhea/cough/nasal congestion/vomiting/diarrhea. Eating and drinking ok. No fevers. Denies bleeding/bruising. No known bloody stools recently.     In ED, initial labs unremarkable. COVID+ however patient reports COVID 3 weeks ago and feeling better. Pt seen by neuro, recommending MRI Brain w/wo, TTE, and plan as above. Normal neuro exam at time of CDU eval.

## 2022-09-16 NOTE — ED CDU PROVIDER INITIAL DAY NOTE - NSICDXFAMILYHX_GEN_ALL_CORE_FT
FAMILY HISTORY:  Father  Still living? No  Family history of CHF (congestive heart failure), Age at diagnosis: Age Unknown    Aunt  Still living? No  Family history of diabetes mellitus, Age at diagnosis: Age Unknown  Family history of lung cancer, Age at diagnosis: Age Unknown  Family history of multiple myeloma, Age at diagnosis: Age Unknown

## 2022-09-16 NOTE — ED PROVIDER NOTE - PROGRESS NOTE DETAILS
Seen by stroke code team. NIH score 0. Suspect TIA over stroke and recommend observation in CDU. Rec starting aspirin and loading dose of palvix 300 followed by 75 q day x 3weeks. -Vickie RODRÍGUEZ PGY6 Patient and wife believe he is back to baseline. -Vickie RODRÍGUEZ PGY6 Patient and wife believe he is back to baseline.  -Vickie RODRÍGUEZ PGY6 Entered late: Patient notes that about 1 time a month he has an intrusive thought of about 1 second that he could jump in front of a train. Lasts for a second. He seems surprised by this and reports he is a very optimistic happy person. Denies suicidal ideation/homicidal ideation/thoughts of wanting to hurt self. This sounds like a passing intrusive thought. In the absence of SI/HI, recommended he discuss with his PMD and get reconnected with psych just to monitor. -Vickie RODRÍGUEZ PGY6

## 2022-09-16 NOTE — ED ADULT NURSE NOTE - CHPI ED NUR SYMPTOMS NEG
Detail Level: Zone
no blurred vision/no change in level of consciousness/no confusion/no dizziness/no fever/no loss of consciousness/no nausea/no numbness/no vomiting

## 2022-09-16 NOTE — ED PROVIDER NOTE - NEUROLOGICAL, MLM
Alert and oriented, no focal deficits. 5/5 strength:  b/l elbow flexion/extension, b/l hip flexion, b/l plantar flexion, b/l flexion/extension, b/l shoulder shrug, neck rotation to the left. Neck rotation to the right slightly diminished. Alert and interactive. 5/5 strength:  b/l elbow flexion/extension, b/l hip flexion, b/l plantar flexion, b/l flexion/extension, b/l shoulder shrug, neck rotation to the left. Neck rotation to the right slightly diminished.

## 2022-09-16 NOTE — ED CDU PROVIDER DISPOSITION NOTE - ATTENDING CONTRIBUTION TO CARE
Patient is a 71 yo M with history of hyperlipidemia, hypothyroidism, IBS, hx of prostate CA, hx of colitis on mesalamine, hx of basal and squamous cancer in CDU for evaluation for possible TIA/ CVA. Patient was evaluated in the ED as a code stroke. He reported that yesterday around 2:30/2:45 pm, he had an episode of right arm and right leg weakness where he couldn't move his right arm or leg and couldn't speak. EMS was called and his symptoms resolved by the time EMS was present. He never had chest pain, shortness of breath. No prior episodes. Per ED note, wife he kept pointing to his right hand and patient's speech was garbled. The episode lasted for 1-2 minutes. Of note, patient had covid 3 weeks ago for the first known time. He denies fevers, chest pain or shortness of breath. No vomiting or diarrhea. Patient was seen by neurology, who recommended an MRI Brain w/wo, TTE.    Patient seen and examined alongside BINH Le.    VS noted  Gen. no acute distress, Non toxic   HEENT: PERRL, EOMI, mmm  Lungs: CTAB/L no C/ W /R   CVS: RRR   Abd; Soft non tender, non distended   Ext: no edema  Skin: no rash  Neuro AAOx3, CN 2-12 intact, strength equal in UE/LE, gait normal, clear speech  a/p: TIA, r/o CVA - Patient is back to baseline, reports no further episodes and has no complaints on my assessment. MRI: MRI: Small focus of diffusion restriction and increased T2 signal within the   left motor cortex strongly suspicious for an area of acute or subacute   infarction. Echo: Conclusions: 1. Normal left ventricular internal dimensions and wall thicknesses. 2. Normal left ventricular systolic function. No segmental wall motion abnormalities. 3. Normal right ventricular size and function. 4. Agitated saline injection and color flow Doppler demonstrates evidence of a patent foramen ovale. Neurology aware. D-dimer negative and LE dopplers negative. Neuro recommendation is for d/c home on ASA, Plavix, Lipitor. Patient is stable for discharge.   - Marita RODRÍGUEZ. 11-Apr-2020

## 2022-09-16 NOTE — ED CDU PROVIDER DISPOSITION NOTE - CLINICAL COURSE
72 y M with pmhx of colitis on mesalamine, hx of basal and squamous cancer  presenting as code stroke. Around 2:30/2:45 pm patient was speaking with wife when all of a sudden he felt like his R arm and R leg were falling asleep and couldn't move his right arm. Pt reports he couldn't speak, he could only make noises. Per wife he kept pointing to his right hand and garbled. She estimates it lasted 1-2 min and that he was back to normal by the time she called for help. Had covid 3 weeks ago for the first known time, but has improved from that stand point. No known rhinorrhea/cough/nasal congestion/vomiting/diarrhea. Eating and drinking ok. No fevers. Denies bleeding/bruising. No known bloody stools recently.     In ED, initial labs unremarkable. COVID+ however patient reports COVID 3 weeks ago and feeling better. Pt seen by neuro, recommending MRI Brain w/wo, TTE, and plan as above. Nonfocal neuro exam at time of CDU eval.  In CDU, 72 y M with pmhx of colitis on mesalamine, hx of basal and squamous cancer  presenting as code stroke. Around 2:30/2:45 pm patient was speaking with wife when all of a sudden he felt like his R arm and R leg were falling asleep and couldn't move his right arm. Pt reports he couldn't speak, he could only make noises. Per wife he kept pointing to his right hand and garbled. She estimates it lasted 1-2 min and that he was back to normal by the time she called for help. Had covid 3 weeks ago for the first known time, but has improved from that stand point. No known rhinorrhea/cough/nasal congestion/vomiting/diarrhea. Eating and drinking ok. No fevers. Denies bleeding/bruising. No known bloody stools recently.     In ED, initial labs unremarkable. COVID+ however patient reports COVID 3 weeks ago and feeling better. Pt seen by neuro, recommending MRI Brain w/wo, TTE, and plan as above. Nonfocal neuro exam at time of CDU eval.  In CDU, pt better, no neurologic deficits, no events on tele, MRI +small stroke. echo +PFO. ddimer negative, dopplers negative. seen by neuro- d/c home on ASA, plavix, lipitor

## 2022-09-16 NOTE — ED PROVIDER NOTE - ATTENDING CONTRIBUTION TO CARE
72 M w/ hx of colitis on mesalamine RHand dominant prior hx of prostate ca s/p resection in 2018 presents to the ER as a code stroke, presents to the ER w/ R hand clumsiness, and weakness, and slurred speech that occurred at 230 and self resolved by 240 PM, pt states this has never occurred before, felt a little dizzy w/ mild headache, pt w/ no fevers, no chills, no ansuea no vomiting. pt states that he was brought in by ambulance, upon arrival to the ER pt w/ improvement in symptoms, no slurred speech. On exam, pt I have reviewed the triage vital signs. Const: no acute distress, Well-developed, Eyes: no conjunctival injection and no scleral icterus ENMT: Moist mucus membranes, CVS: +S1/S2, radial pulse 2+ bilaterally RESP: Unlabored respiratory effort, Clear to auscultation bilaterally GI: Nontender/Nondistended soft abdomen, no CVA tenderness MSK: Extremities w/o deformity or ttp, Psych: Awake, Alert, & Orientedx3;  Appropriate mood and affect, cooperative Neuro: 5/5 bilateral elbow flexion/extension, 5/5 bilateral wrist flexion/extension, 5/5 bilateral hand , 5/5 bilateral hip flexion/extension, 5/5 bilateral knee flexion/extension, 5/5 bilateral dorsiflexion/plantarflexion, intact sensation in the bilateral arms and legs to light touch, normal finger to nose bilaterally w/ no dysmetria, EOMI, CN2-12 intact, pupils are 4 mm and reactive bilaterally  Pt w/ claustrophobia states can do a closed MRI but will need valium pt to get benzo prior to MRI. pt to start asa and plavix. concern for TIA neuro recommending observation

## 2022-09-16 NOTE — ED CDU PROVIDER INITIAL DAY NOTE - MEDICAL DECISION MAKING DETAILS
Recommendation Preamble: The following recommendations were made during the visit: Recommendations (Free Text): Recommended ISDIN Healthy Scalp products Detail Level: Zone Render Risk Assessment In Note?: no see attestation

## 2022-09-16 NOTE — ED ADULT NURSE REASSESSMENT NOTE - NS ED NURSE REASSESS COMMENT FT1
Pt complaining of slight headache at this time- denies wanting any pain medication. Pt instructed to inform RN if pain medication is needed. VS updated- see flowsheet. Pt instructed not to leave room due to covid precautions.

## 2022-09-16 NOTE — ED ADULT NURSE REASSESSMENT NOTE - NS ED NURSE REASSESS COMMENT FT1
Received pt from ZHOU Singleton , received pt alert and responsive, oriented x4, denies any respiratory distress, SOB, or difficulty breathing. Pt transferred to CDU for TIA. Pending MRI in AM pt aware. On telemetry pt is SR on monitor.  IV in place, patent and free of signs of infiltration, on continuos cardiac monitoring as ordered, NSR.  pt denies chest pain or palpitations, V/S stable, pt afebrile, pt denies pain at this time. Pt educated on unit and unit rules, instructed patient to notify RN of any needed assistance, Pt verbalizes understanding, Call bell placed within reach. Safety maintained. Will continue to monitor. Family member at bedside.

## 2022-09-16 NOTE — CONSULT NOTE ADULT - ATTENDING COMMENTS
code stroke called and neurology emergently assessed patient. Briefly   73 yo RH male with a hypothyroidism, anxiety, IBS, prostate cancer, skin cancer (unknown type), and HLD who presented to the ED on 09/16 for transient RUE plegia + speech disturbance. LKW 09/16 @ 1430.   CTH, CTA H/N unremarkable.   Neuro exam nonfocal. 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 Etiology ESUS.  may be related to covid hypercoagulability   - Dual antiplatelet therapy with ASA 81mg PO daily and Clopidogrel 75mg PO daily x 3 weeks followed by monotherapy with ASA 81mg PO daily for now;   - please check D dimer and APLS labs.   - if D dimer is 2x the upper limit of normal would place on eliquis 5mg BID x 1 month for possible covid hypercoagulability   - check LE doppler given +PFO   - High dose statin therapy - atorvastatin 80mg PO daily. LDL goal <70mg/dL.   - outpatient cardio f/u with Dr. Natalio yoder for ILR.   - telemetry  - PT/OT/SS/SLP, OOBC --> no needs   - permissive HTN, -180mmHg x24hrs, can now bring down to normotension .  - check FS, glucose control <180  - GI/DVT ppx  - Counseling on diet, exercise, and medication adherence was done  - Counseling on smoking cessation and alcohol consumption offered when appropriate.  - Pain assessed and judicious use of narcotics when appropriate was discussed.    - Stroke education given when appropriate.  - Importance of fall prevention discussed.   - Differential diagnosis and plan of care discussed with patient and/or family and primary team  - Thank you for allowing me to participate in the care of this patient. Call with questions.   - quick outpatient f/u with me and cardio.  would also send to hematology outpatient  - spoke with CDU   Josue Aldrich MD  Vascular Neurology  Office: [phone]

## 2022-09-16 NOTE — CONSULT NOTE ADULT - ASSESSMENT
Assessment: 71 yo RH male with a PMHx of hypothyroidism, anxiety, IBS, prostate cancer, skin cancer (unknown type), and HLD who presented to the ED on 09/16 for transient RUE plegia + speech disturbance. LKW 09/16 @ 1430. CTH, CTA H/N unremarkable. Neuro exam nonfocal.    Impression: Transient RUE plegia + speech disturbance, possibly due to L brain dysfunction in setting of transient ischemic attack vs less-likely acute ischemic stroke.    Recommendations:  [] CDU.  [] Neuro checks Q4HR.  [] Telemetry.  [] Fall/Aspiration precautions.  [x] Passed dysphagia screen.   [] Keep BP permissive up to 220/110 for now.  [] MRI brain w/wo contrast (given history of cancer).  [] TTE.  [] Start ASA 81MG PO QD.  [] Give Plavix 300MG PO x1 loading dose, then tomorrow start Plavix 75MG PO QD x3 weeks. After that, patient will remain on ASA monotherapy (CHANCE Trial Protocol).  [] Start Atorvastatin 80MG PO QHS (titrate to goal LDL < 70).  [] Send Lipid panel, HbA1c, TSH, B12, Folate.   [] Rest of care per CDU team.    Patient discussed with stroke fellow Dr. Muñoz. Final recommendations regarding management to be confirmed upon review by stroke attending Dr. Aldrich.

## 2022-09-16 NOTE — ED PROVIDER NOTE - OBJECTIVE STATEMENT
72 y M with pmhx of colitis on mesalamine presenting as code stroke. Around 2:30/2:45 pm patient was speaking with wife when all of a sudden he couldn't move his right hand. Pt reports he couldn't speak, he could only make noises. Per wife he kept pointing to his right hand and garbled. She estimates it lasted 1-2 min and that he was back to normal by the time she called for help. Had covid 3 weeks ago for the first known time, but has improved from that stand point. No known rhinorrhea/cough/nasal congestion/vomiting/diarrhea. Eating and drinking ok. 72 y M with pmhx of colitis on mesalamine, hx of basal and squamous cancer  presenting as code stroke. Around 2:30/2:45 pm patient was speaking with wife when all of a sudden he couldn't move his right hand. Pt reports he couldn't speak, he could only make noises. Per wife he kept pointing to his right hand and garbled. She estimates it lasted 1-2 min and that he was back to normal by the time she called for help. Had covid 3 weeks ago for the first known time, but has improved from that stand point. No known rhinorrhea/cough/nasal congestion/vomiting/diarrhea. Eating and drinking ok.    Meds: ambien, mesalamine 72 y M with pmhx of colitis on mesalamine, hx of basal and squamous cancer  presenting as code stroke. Around 2:30/2:45 pm patient was speaking with wife when all of a sudden he felt like his R arm and R leg were falling asleep and couldn't move his right arm. Pt reports he couldn't speak, he could only make noises. Per wife he kept pointing to his right hand and garbled. She estimates it lasted 1-2 min and that he was back to normal by the time she called for help. Had covid 3 weeks ago for the first known time, but has improved from that stand point. No known rhinorrhea/cough/nasal congestion/vomiting/diarrhea. Eating and drinking ok. No fevers. Denies bleeding/bruising. No known bloody stools recently.     Meds: ambien, mesalamine 72 y M with pmhx of colitis on mesalamine, hx of basal and squamous cancer  presenting as code stroke. Around 2:30/2:45 pm patient was speaking with wife when all of a sudden he felt like his R arm and R leg were falling asleep and couldn't move his right arm. Pt reports he couldn't speak, he could only make noises. Per wife he kept pointing to his right hand and garbled. She estimates it lasted 1-2 min and that he was back to normal by the time she called for help. Had covid 3 weeks ago for the first known time, but has improved from that stand point. No known rhinorrhea/cough/nasal congestion/vomiting/diarrhea. Eating and drinking ok. No fevers. Denies bleeding/bruising. No known bloody stools recently. Patient notes that about 1 time a month he has an intrusive thought of about 1 second that he could jump in front of a train.     Meds: ambien, mesalamine

## 2022-09-16 NOTE — ED CDU PROVIDER INITIAL DAY NOTE - ATTENDING APP SHARED VISIT CONTRIBUTION OF CARE
72 M w/ hx of colitis on mesalamine RH dominant prior hx of prostate ca s/p resection in 2018 presents to the ER as a code stroke, presents to the ER w/ R hand clumsiness, and weakness, and slurred speech that occurred at 230 and self resolved by 240 PM, pt states this has never occurred before, felt a little dizzy w/ mild headache, pt w/ no fevers, no chills, no nausea no vomiting. pt states that he was brought in by ambulance, upon arrival to the ER pt w/ improvement in symptoms, no slurred speech. on exam, pt neuro intact plan for ASA and plavix concern for TIA and neuro recommending MRI pt comfortable w/ observation

## 2022-09-16 NOTE — ED ADULT NURSE NOTE - ED STAT RN HANDOFF DETAILS 2
1900 Report given to receiving change of shift SHMUEL Taylor  patient is in no acute distress. Patient vital signs stable, plan of care explained.

## 2022-09-16 NOTE — ED ADULT NURSE REASSESSMENT NOTE - NS ED NURSE REASSESS COMMENT FT1
Pt assessed, resting in bed with wife at bedside. Denies any complaints at this time of chest pain/SOB/dizziness/weakness/nausea, no new neuro deficits noted. Pt and spouse updated on POC.

## 2022-09-16 NOTE — ED CDU PROVIDER DISPOSITION NOTE - NSFOLLOWUPINSTRUCTIONS_ED_ALL_ED_FT
Rest. Keep self well hydrated. Continue home medications as prescribed.     Start taking Aspirin 81mg daily and Atorvastatin 80mg daily.   Take Plavix 75mg once daily for 3 weeks.    Follow up with your primary care provider and Neurologist,  -- in 24-48 hours. Take copy of your printed results with you to your appointment.     Return to ER for any weakness/dizziness, numbness/tingling, change in speech or vision, problems walking or any other concerns.     -----------------------      Please read the information below concerning stroke prevention:    Stroke Prevention  Some medical conditions and behaviors are associated with a higher chance of having a stroke. You can help prevent a stroke by making nutrition, lifestyle, and other changes, including managing any medical conditions you may have.  WHAT NUTRITION CHANGES CAN BE MADE?  Eat healthy foods. You can do this by:  · Choosing foods high in fiber, such as fresh fruits and vegetables and whole grains.  · Eating at least 5 or more servings of fruits and vegetables a day. Try to fill half of your plate at each meal with fruits and vegetables.  · Choosing lean protein foods, such as lean cuts of meat, poultry without skin, fish, tofu, beans, and nuts.  · Eating low-fat dairy products.  · Avoiding foods that are high in salt (sodium). This can help lower blood pressure.  · Avoiding foods that have saturated fat, trans fat, and cholesterol. This can help prevent high cholesterol.  · Avoiding processed and premade foods.  Follow your health care provider's specific guidelines for losing weight, controlling high blood pressure (hypertension), lowering high cholesterol, and managing diabetes. These may include:  · Reducing your daily calorie intake.  · Limiting your daily sodium intake to 1,500 milligrams (mg).  · Using only healthy fats for cooking, such as olive oil, canola oil, or sunflower oil.  · Counting your daily carbohydrate intake.  WHAT LIFESTYLE CHANGES CAN BE MADE?  Maintain a healthy weight. Talk to your health care provider about your ideal weight.  Get at least 30 minutes of moderate physical activity at least 5 days a week. Moderate activity includes brisk walking, biking, and swimming.  Do not use any products that contain nicotine or tobacco, such as cigarettes and e-cigarettes. If you need  help quitting, ask your health care provider. It may also be helpful to avoid exposure to secondhand smoke.  Limit alcohol intake to no more than 1 drink a day for nonpregnant women and 2 drinks a day for men. One  drink equals 12 oz of beer, 5 oz of wine, or 1½ oz of hard liquor.  Stop any illegal drug use.      Avoid taking birth control pills. Talk to your health care provider about the risks of taking birth control pills if:  · You are over 35 years old.  · You smoke.  · You get migraines.  · You have ever had a blood clot.  WHAT OTHER CHANGES CAN BE MADE?  Manage your cholesterol levels.  · Eating a healthy diet is important for preventing high cholesterol. If cholesterol cannot be managed  through diet alone, you may also need to take medicines.  · Take any prescribed medicines to control your cholesterol as told by your health care provider.  Manage your diabetes.  · Eating a healthy diet and exercising regularly are important parts of managing your blood sugar. If your  blood sugar cannot be managed through diet and exercise, you may need to take medicines.  · Take any prescribed medicines to control your diabetes as told by your health care provider.  Control your hypertension.  · To reduce your risk of stroke, try to keep your blood pressure below 130/80.  · Eating a healthy diet and exercising regularly are an important part of controlling your blood pressure. If  your blood pressure cannot be managed through diet and exercise, you may need to take medicines.  · Take any prescribed medicines to control hypertension as told by your health care provider.  · Ask your health care provider if you should monitor your blood pressure at home.  · Have your blood pressure checked every year, even if your blood pressure is normal. Blood pressure  increases with age and some medical conditions.  Get evaluated for sleep disorders (sleep apnea). Talk to your health care provider about getting a sleep evaluation if you snore a lot or have excessive sleepiness.  Take over-the-counter and prescription medicines only as told by your health care provider. Aspirin or blood thinners (antiplatelets or anticoagulants) may be recommended to reduce your risk of forming blood clots that can lead to stroke.  Make sure that any other medical conditions you have, such as atrial fibrillation or atherosclerosis, are  managed.  WHAT ARE THE WARNING SIGNS OF A STROKE?  The warning signs of a stroke can be easily remembered as BEFAST.  B is for balance. Signs include:  · Dizziness.  · Loss of balance or coordination.  · Sudden trouble walking.  E is for eyes. Signs include:  · A sudden change in vision.  · Trouble seeing.  F is for face. Signs include:  · Sudden weakness or numbness of the face.  · The face or eyelid drooping to one side.  A is for arms. Signs include:  · Sudden weakness or numbness of the arm, usually on one side of the body.  S is for speech. Signs include:  · Trouble speaking (aphasia).  · Trouble understanding.  T is for time.      · These symptoms may represent a serious problem that is an emergency. Do not wait to see if the symptoms will go away. Get medical help right away. Call your local emergency services (911 in the [place] Do not drive yourself to the hospital.  Other signs of stroke may include:  · A sudden, severe headache with no known cause.  · Nausea or vomiting.  · Seizure.    WHERE TO FIND MORE INFORMATION  For more information, visit:  American Stroke Association: www.strokeassociation.org  National Stroke Association: www.stroke.org  SUMMARY  You can prevent a stroke by eating healthy, exercising, not smoking, limiting alcohol intake, and managing  any medical conditions you may have.  Do not use any products that contain nicotine or tobacco, such as cigarettes and e-cigarettes. If you need  help quitting, ask your health care provider. It may also be helpful to avoid exposure to secondhand smoke.  Remember BEFAST for warning signs of stroke. Get help right away if you or a loved one has any of these  signs.    CARE AGREEMENT:    You have the right to help plan your care. Learn about your health condition and how it may be treated. Discuss treatment options with your healthcare providers to decide what care you want to receive. You always have the right to refuse treatment. Rest. Keep self well hydrated. Continue home medications as prescribed.     Start taking Aspirin 81mg daily and Atorvastatin 80mg daily.   Take Plavix 75mg once daily for 3 weeks.     Follow up with your PCP in 1 -2 days.     Follow up with Neurology Dr. Aldrich #[phone] in 3-4 days.    Follow up with your Cardiologist Dr. Natalio Covington in 2-3 days, can set you up with a loop recorder     Bring Printed Results to your Doctor Visits. Stroke Education given to you.       Return if symptoms worsen, fever, weakness, numbness, dizziness, vision change, slurred speech and all other concerns    ***Follow up the following with your doctors:  patent foramen ovale on echocardiogram with the Cardiologist  elevated creatinine (kidney function) with your Doctors  elevated TSH level - can indicate that you are hypothyroid  elevated vitamin b12 level  elevated cholesterol diet  mild diastolic dysfunction found on echo    -----------------------      Please read the information below concerning stroke prevention:          Stroke    AMBULATORY CARE:    A stroke happens when blood flow to part of the brain is stopped. This can cause serious brain damage from a lack of oxygen. A stroke caused by a blood clot is called an ischemic stroke. A stroke caused by a burst or torn blood vessel is called an intracerebral hemorrhage, or a hemorrhagic stroke. When stroke symptoms go away completely within minutes to hours and do not cause damage, it is called a transient ischemic attack (TIA). A TIA is a warning sign that you are at risk of soon having a stroke.  Ischemic Stroke       Hemorrhagic Stroke         Know the warning signs of a stroke: The words BE FAST can help you remember and recognize warning signs of a stroke:  •B = Balance: Sudden loss of balance      •E = Eyes: Loss of vision in one or both eyes      •F = Face: Face droops on one side      •A = Arms: Arm drops when both arms are raised      •S = Speech: Speech is slurred or sounds different      •T = Time: Time to get help immediately    BE FAST SIGNS OF A STROKE         Call your local emergency number (821 in the [place]) or have someone call if:   •You have any of the following signs of a stroke: ?Numbness or drooping on one side of your face       ?Weakness in an arm or leg      ?Confusion or difficulty speaking      ?Dizziness, a severe headache, or vision loss    BE FAST SIGNS OF A STROKE         •You have a seizure.      •You have chest pain or shortness of breath.      Seek care immediately if:   •Your arm or leg feels warm, tender, and painful. It may look swollen and red.      •You have loss of balance or coordination.      •You have double vision or vision loss.      •You have unusual or heavy bleeding.      Call your doctor or neurologist if:   •Your blood sugar level or blood pressure is higher or lower than usual.      •You have trouble swallowing.      •You have trouble having a bowel movement or urinating.      •You have questions or concerns about your condition or care.      Signs and symptoms depend on the type of stroke you had and where it occurred:   •Loss of consciousness      •Loss of vision in one or both eyes      •Sudden weakness or paralysis in your arm, leg, or face      •Sudden trouble walking, speaking, or understanding words you hear or read      •Vomiting or a severe headache      Treatment depends on the type of stroke you had:   •Medicines may be given to prevent or break up blood clots, or help your blood clot more easily. You may also need medicines to treat high cholesterol, high blood pressure, or diabetes.      •Thrombolysis is a procedure used to break apart clots in an artery. A catheter is guided into the artery until it is near the clot. Medicine is put through the catheter that will help break apart the clot. The clot may be pulled out of the artery.      •Surgery may be used to remove a blood clot or to relieve pressure within your brain. You may also need surgery to remove plaque buildup from your carotid arteries.      Recovery testing: Your healthcare provider will test your recovery 90 days (3 months) after your stroke. This may be done over the phone or in person. Your provider will ask how well you can do the activities you did before the stroke. He or she will also ask how well you can do your daily activities without help. Your provider may make recommendations for you based on your test. For example, you may need someone to help you walk safely. You may also need help with daily activities, such as getting dressed. Based on your answers, your provider may do this test again over time.    Manage the effects of a stroke:   •Go to stroke rehabilitation (rehab) if directed. Rehab is a program run by specialists who will help you recover abilities you may have lost. Specialists include physical, occupational, and speech therapists. Physical therapists help you gain strength or keep your balance. Occupational therapists teach you new ways to do daily activities. Your therapy may include movements for everyday activities. An example is being able to raise yourself from a chair. A speech therapist helps you improve your ability to talk and swallow.      •Make your home safe. Remove anything you might trip over. Tape electrical cords down. Keep paths clear throughout your home. Make sure your home is well lit. Put nonslip materials on surfaces that might be slippery. An example is your bathtub or shower floor. A cane or walker may help you keep your balance as you walk.  Fall Prevention for Adults           Prevent another stroke:   •Manage health conditions. A condition such as diabetes can increase your risk for a stroke. Control your blood sugar level if you have hyperglycemia or diabetes. Take your prescribed medicines and check your blood sugar level as directed.  How to check your blood sugar           •Check your blood pressure as directed. High blood pressure can increase your risk for a stroke. Follow your healthcare provider’s directions for controlling your blood pressure.   How to take a Blood Pressure           •Do not use nicotine products or illegal drugs. Nicotine and other chemicals in cigarettes and cigars can cause blood vessel damage. Nicotine and illegal drugs both increase your risk for a stroke. Ask your healthcare provider for information if you currently smoke or use drugs and need help to quit. E- cigarettes or smokeless tobacco still contain nicotine. Talk to your healthcare provider before you use these products.      •Talk to your healthcare provider about alcohol. Alcohol can raise your blood pressure. The recommended limit is 2 drinks in a day for men and 1 drink in a day for women. Do not binge drink or save a week's worth of alcohol to drink in 1 or 2 days. Limit weekly amounts as directed by your provider.      •Eat a variety of healthy foods. Healthy foods include whole-grain breads, low-fat dairy products, beans, lean meats, and fish. Eat at least 5 servings of fruits and vegetables each day. Choose foods that are low in fat, cholesterol, salt, and sugar. Eat foods that are high in potassium, such as potatoes and bananas. A dietitian can help you create healthy meal plans.  Healthy Foods           •Maintain a healthy weight. Ask your healthcare provider what a healthy weight is for you. Ask him or her to help you create a weight loss plan, if needed. He or she can help you create small goals if you have a lot of weight to lose.      •Exercise as directed. Exercise can lower your blood pressure, cholesterol, weight, and blood sugar levels. Healthcare providers will help you create exercise goals. They can also help you make a plan to reach your goals. For example, you can break exercise into 10 minute periods, 3 times in the day. Find an exercise that you enjoy. This will make it easier for you to reach your exercise goals.  Diverse Family Walking for Exercise           •Manage stress. Stress can raise your blood pressure. Find new ways to relax, such as deep breathing or listening to music.      What you need to know about depression after a stroke: Talk to your healthcare provider if you have depression that continues or is getting worse. Your provider may be able to help treat your depression. Your provider can also recommend support groups for you to join. A support group is a place to talk with others who have had a stroke. It may also help to talk to friends and family members about how you are feeling. Tell your family and friends to let your healthcare provider know if they see any signs of depression:   •Extreme sadness      •Avoiding social interaction with family or friends      •A lack of interest in things you once enjoyed      •Irritability      •Trouble sleeping      •Low energy levels      •A change in eating habits or sudden weight gain or loss      Follow up with your doctor or neurologist as directed: You may need to come in for regular tests of your brain function. Your medicines may also need to be checked. Write down your questions so you remember to ask them during your visits.    For support and more information:   •American Heart Association and American Stroke Association  [address] [place] [address]  Phone: 1-616.814.7415  Web Address: http://www.heart.org           © Copyright [employer] 2022           back to top                          © Copyright [employer] 2022

## 2022-09-16 NOTE — ED CDU PROVIDER INITIAL DAY NOTE - NSICDXPASTMEDICALHX_GEN_ALL_CORE_FT
PAST MEDICAL HISTORY:  Hyperlipidemia been on medication for >5 years    Hypothyroidism     IBS (irritable bowel syndrome)     Malignant neoplasm of prostate

## 2022-09-16 NOTE — ED ADULT NURSE NOTE - OBJECTIVE STATEMENT
71yo M aaox4 preents to ED from home c/o R arm weakness, and sludge speech, as per EMS around 1430 had an episode od R arm weakness ( cant moved it at all), and sludge speech, lasting for few minutes and resolved on it own, Upon arrived code stroke initiated ( 1527) , placed IV, draw blood, pt was taking to CT Scan

## 2022-09-16 NOTE — ED CDU PROVIDER DISPOSITION NOTE - PATIENT PORTAL LINK FT
You can access the FollowMyHealth Patient Portal offered by [facility] by registering at the following website: [email or web address]. By joining [employer]’s FollowMyHealth portal, you will also be able to view your health information using other applications (apps) compatible with our system.

## 2022-09-16 NOTE — CONSULT NOTE ADULT - SUBJECTIVE AND OBJECTIVE BOX
COGAN, DENNIS  Male  MRN-[id number]    HPI: 71 yo RH male with a PMHx of hypothyroidism, anxiety, IBS, prostate cancer, skin cancer (unknown type), and HLD who presented to the ED on 09/16 for transient RUE plegia + speech disturbance. LKW 09/16 @ 1430. Patient states he then had sudden RUE plegia and he could not get any words out, only nonsensical sounds. These symptoms were mostly resolved by the time the patient arrived to the ED, and were completely resolved by the time the ED imaging was completed. He has never had any episodes similar to this in the past. Denies HA, dizziness, vision changes, numbness/tingling. Not a candidate for tPA as NIHSS 0. Not a candidate for MT as no LVO on imaging.     NIHSS: 0  ABCD2: 4  MRS: 0    ROS: All negative except as mentioned in HPI.    PAST MEDICAL & SURGICAL HISTORY:  Hypothyroidism    IBS (irritable bowel syndrome)    Malignant neoplasm of prostate    Hyperlipidemia    S/P tonsillectomy  age 5    FAMILY HISTORY:  No family history of stroke.    SOCIAL HISTORY:  Lives with wife, nonsmoker. Occasional EtOH.     Allergies    No Known Allergies    Intolerances    Gluten (Stomach Upset)    Vital Signs Last 24 Hrs  T(C): 36.6 (16 Sep 2022 16:45), Max: 36.6 (16 Sep 2022 15:39)  T(F): 97.8 (16 Sep 2022 16:45), Max: 97.8 (16 Sep 2022 15:39)  HR: 54 (16 Sep 2022 16:45) (54 - 64)  BP: 126/80 (16 Sep 2022 16:45) (126/80 - 142/72)  RR: 18 (16 Sep 2022 16:45) (18 - 18)  SpO2: 100% (16 Sep 2022 16:45) (100% - 100%)    Parameters below as of 16 Sep 2022 16:45  Patient On (Oxygen Delivery Method): room air    General Exam:  Constitutional: Lying on stretcher, NAD.  Head: Normocephalic, atraumatic.  Extremities: No edema, no cyanosis.    Neuro Exam:   MS: Alert, eyes open spontaneously. Oriented to self, age, location, month, year. Speech is fluent, not slurred. Able to name objects and repeat. Follows commands.  CN: PERRL. VFF. EOMI. V1-V3 intact. Face symmetric. Tongue midline.   Motor: All extremities antigravity without drift.              Deltoid	Biceps	Triceps	Wrist	   R	5	5	5	5	5	  L	5	5	5	5	5    	H-Flex  R	5		   L	5	  Sensory: Intact to LT throughout. No extinction.  Coordination: No dysmetria on FNF or on HTS bilaterally.  Gait: Normal gait, normal pivot.    LABS:               14.7   5.49  )-----------( 173      ( 16 Sep 2022 15:34 )             43.3     09-16    139  |  108  |  25<H>  ----------------------------<  143<H>  4.4   |  22  |  1.32<H>    Ca    9.1      16 Sep 2022 15:34    TPro  6.4  /  Alb  4.1  /  TBili  0.2  /  DBili  x   /  AST  19  /  ALT  12  /  AlkPhos  98  09-16    PT/INR - ( 16 Sep 2022 15:34 )   PT: 12.6 sec;   INR: 1.09 ratio      PTT - ( 16 Sep 2022 15:34 )  PTT:27.3 sec    RADIOLOGY:    -09/16 CTH: Unremarkable noncontrast brain CT.   -09/16 CTA H/N: Normal CTA of the head and neck.

## 2022-09-16 NOTE — ED ADULT NURSE NOTE - CHIEF COMPLAINT
Attempted to call report to ICU.
Pt was given 2mg of Ativan instead of 4 mg due to drowsiness. Pt has not been vomiting.
Report given to ICU RN.
Report to [facility] RN
The patient is a 72y Male complaining of

## 2022-09-16 NOTE — ED PROVIDER NOTE - CLINICAL SUMMARY MEDICAL DECISION MAKING FREE TEXT BOX
72 M w/ hx of colitis on mesalamine RH dominant prior hx of prostate ca s/p resection in 2018 presents to the ER as a code stroke, presents to the ER w/ R hand clumsiness, and weakness, and slurred speech that occurred at 230 and self resolved by 240 PM, pt states this has never occurred before, felt a little dizzy w/ mild headache, pt w/ no fevers, no chills, no nausea no vomiting. pt states that he was brought in by ambulance, upon arrival to the ER pt w/ improvement in symptoms, no slurred speech. on exam, pt neuro intact plan for ASA and plavix concern for TIA and neuro recommending MRI pt comfortable w/ observation.

## 2022-09-17 VITALS
DIASTOLIC BLOOD PRESSURE: 80 MMHG | OXYGEN SATURATION: 97 % | RESPIRATION RATE: 16 BRPM | HEART RATE: 61 BPM | TEMPERATURE: 98 F | SYSTOLIC BLOOD PRESSURE: 123 MMHG

## 2022-09-17 LAB
A1C WITH ESTIMATED AVERAGE GLUCOSE RESULT: 5.6 % — SIGNIFICANT CHANGE UP (ref 4–5.6)
CHOLEST SERPL-MCNC: 197 MG/DL — SIGNIFICANT CHANGE UP
D DIMER BLD IA.RAPID-MCNC: 170 NG/ML DDU — SIGNIFICANT CHANGE UP
ESTIMATED AVERAGE GLUCOSE: 114 MG/DL — SIGNIFICANT CHANGE UP (ref 68–114)
FOLATE SERPL-MCNC: >20 NG/ML — SIGNIFICANT CHANGE UP
HDLC SERPL-MCNC: 43 MG/DL — SIGNIFICANT CHANGE UP
LIPID PNL WITH DIRECT LDL SERPL: 144 MG/DL — HIGH
NON HDL CHOLESTEROL: 154 MG/DL — HIGH
TRIGL SERPL-MCNC: 50 MG/DL — SIGNIFICANT CHANGE UP
TSH SERPL-MCNC: 4.8 UIU/ML — HIGH (ref 0.27–4.2)
VIT B12 SERPL-MCNC: 1554 PG/ML — HIGH (ref 232–1245)

## 2022-09-17 PROCEDURE — 99217: CPT

## 2022-09-17 PROCEDURE — 70553 MRI BRAIN STEM W/O & W/DYE: CPT | Mod: 26,MA

## 2022-09-17 PROCEDURE — 85379 FIBRIN DEGRADATION QUANT: CPT

## 2022-09-17 PROCEDURE — 83036 HEMOGLOBIN GLYCOSYLATED A1C: CPT

## 2022-09-17 PROCEDURE — A9585: CPT

## 2022-09-17 PROCEDURE — 82947 ASSAY GLUCOSE BLOOD QUANT: CPT

## 2022-09-17 PROCEDURE — 85014 HEMATOCRIT: CPT

## 2022-09-17 PROCEDURE — 36415 COLL VENOUS BLD VENIPUNCTURE: CPT

## 2022-09-17 PROCEDURE — 80061 LIPID PANEL: CPT

## 2022-09-17 PROCEDURE — 85025 COMPLETE CBC W/AUTO DIFF WBC: CPT

## 2022-09-17 PROCEDURE — 84443 ASSAY THYROID STIM HORMONE: CPT

## 2022-09-17 PROCEDURE — 82330 ASSAY OF CALCIUM: CPT

## 2022-09-17 PROCEDURE — 82803 BLOOD GASES ANY COMBINATION: CPT

## 2022-09-17 PROCEDURE — 93306 TTE W/DOPPLER COMPLETE: CPT

## 2022-09-17 PROCEDURE — 85018 HEMOGLOBIN: CPT

## 2022-09-17 PROCEDURE — 87635 SARS-COV-2 COVID-19 AMP PRB: CPT

## 2022-09-17 PROCEDURE — 93970 EXTREMITY STUDY: CPT | Mod: 26

## 2022-09-17 PROCEDURE — 70450 CT HEAD/BRAIN W/O DYE: CPT | Mod: MA

## 2022-09-17 PROCEDURE — 70553 MRI BRAIN STEM W/O & W/DYE: CPT | Mod: MA

## 2022-09-17 PROCEDURE — 93306 TTE W/DOPPLER COMPLETE: CPT | Mod: 26

## 2022-09-17 PROCEDURE — 99291 CRITICAL CARE FIRST HOUR: CPT | Mod: 25

## 2022-09-17 PROCEDURE — 82607 VITAMIN B-12: CPT

## 2022-09-17 PROCEDURE — 82962 GLUCOSE BLOOD TEST: CPT

## 2022-09-17 PROCEDURE — 82435 ASSAY OF BLOOD CHLORIDE: CPT

## 2022-09-17 PROCEDURE — 70498 CT ANGIOGRAPHY NECK: CPT | Mod: MA

## 2022-09-17 PROCEDURE — 84484 ASSAY OF TROPONIN QUANT: CPT

## 2022-09-17 PROCEDURE — 84295 ASSAY OF SERUM SODIUM: CPT

## 2022-09-17 PROCEDURE — 82746 ASSAY OF FOLIC ACID SERUM: CPT

## 2022-09-17 PROCEDURE — 83605 ASSAY OF LACTIC ACID: CPT

## 2022-09-17 PROCEDURE — 85610 PROTHROMBIN TIME: CPT

## 2022-09-17 PROCEDURE — 84132 ASSAY OF SERUM POTASSIUM: CPT

## 2022-09-17 PROCEDURE — 70496 CT ANGIOGRAPHY HEAD: CPT | Mod: MA

## 2022-09-17 PROCEDURE — 85730 THROMBOPLASTIN TIME PARTIAL: CPT

## 2022-09-17 PROCEDURE — 80053 COMPREHEN METABOLIC PANEL: CPT

## 2022-09-17 PROCEDURE — G0378: CPT

## 2022-09-17 PROCEDURE — 93970 EXTREMITY STUDY: CPT

## 2022-09-17 RX ORDER — CLOPIDOGREL BISULFATE 75 MG/1
75 TABLET, FILM COATED ORAL DAILY
Refills: 0 | Status: COMPLETED | OUTPATIENT
Start: 2022-09-17 | End: 2022-09-17

## 2022-09-17 RX ORDER — CLOPIDOGREL BISULFATE 75 MG/1
1 TABLET, FILM COATED ORAL
Qty: 21 | Refills: 0
Start: 2022-09-17 | End: 2022-10-07

## 2022-09-17 RX ORDER — ATORVASTATIN CALCIUM 80 MG/1
1 TABLET, FILM COATED ORAL
Qty: 30 | Refills: 0
Start: 2022-09-17 | End: 2022-10-16

## 2022-09-17 RX ADMIN — Medication 1 MILLIGRAM(S): at 08:28

## 2022-09-17 RX ADMIN — CLOPIDOGREL BISULFATE 75 MILLIGRAM(S): 75 TABLET, FILM COATED ORAL at 15:26

## 2022-09-17 RX ADMIN — Medication 81 MILLIGRAM(S): at 15:25

## 2022-09-17 NOTE — ED CDU PROVIDER SUBSEQUENT DAY NOTE - NSICDXPASTMEDICALHX_GEN_ALL_CORE_FT
PAST MEDICAL HISTORY:  Hyperlipidemia been on medication for >5 years    Hypothyroidism     IBS (irritable bowel syndrome)     Malignant neoplasm of prostate      Impaired Impaired Impaired Impaired

## 2022-09-17 NOTE — ED CDU PROVIDER SUBSEQUENT DAY NOTE - ATTENDING APP SHARED VISIT CONTRIBUTION OF CARE
Patient is a 73 yo M with history of hyperlipidemia, hypothyroidism, IBS, hx of prostate CA, hx of colitis on mesalamine, hx of basal and squamous cancer in CDU for evaluation for possible TIA/ CVA. Patient was evaluated in the ED as a code stroke. He reported that yesterday around 2:30/2:45 pm, he had an episode of right arm and right leg weakness where he couldn't move his right arm or leg and couldn't speak. EMS was called and his symptoms resolved by the time EMS was present. He never had chest pain, shortness of breath. No prior episodes. Per ED note, wife he kept pointing to his right hand and patient's speech was garbled. The episode lasted for 1-2 minutes. Of note, patient had covid 3 weeks ago for the first known time. He denies fevers, chest pain or shortness of breath. No vomiting or diarrhea. Patient was seen by neurology, who recommended an MRI Brain w/wo, TTE.    Patient seen and examined alongside [facility].    VS noted  Gen. no acute distress, Non toxic   HEENT: PERRL, EOMI, mmm  Lungs: CTAB/L no C/ W /R   CVS: RRR   Abd; Soft non tender, non distended   Ext: no edema  Skin: no rash  Neuro AAOx3, CN 2-12 intact, strength equal in UE/LE, gait normal, clear speech  a/p: TIA, r/o CVA - plan for MRI imaging, TTE and final neurology recs. Patient is back to baseline, reports no further episodes and has no complaints on my assessment.   - Marita RODRÍGUEZ

## 2022-09-17 NOTE — ED CDU PROVIDER SUBSEQUENT DAY NOTE - PROGRESS NOTE DETAILS
CDU NOTE [facility] Le: pt resting comfortably, feels well without complaint. feels himself. NAD VSS. no events on tele. neuro exam- no focal deficit, m/s intact x 4, CNII-XII grossly intact.   MRI today CDU NOTE [facility] Le: MRI +small stroke. echo- +PFO.  pt seen by Dr. Aldrich and results reviewed with him, ok to d/c home on ASA, plavix x 3 weeks, lipitor 80mg daily. pt to f/up outpt in his office and with pt's cardiologist Dr. Covington for PFO and loop recorder.  Dr. Marita marcelo with plan, stable for d/c home CDU NOTE [facility] Le: prior to d/c patient, received call from Dr. Aldrich, in setting of patient having recent COVID INFECTION, recommends b/l LE dopplers, Lupus panel, DDimer before discharging. pt ok with that plan. all tests ordered CDU NOTE [facility] Le: ddimer negative. b/l LE dopplers negative. per Dr. Aldrich- if these tests negative then patient goes home on ASA, Plavix, lipitor.  as per Dr. Kirkland pt stable for d/c home

## 2022-09-17 NOTE — ED CDU PROVIDER SUBSEQUENT DAY NOTE - HISTORY
Patient resting comfortably, NAD. Ambulatory to bathroom with no difficulty. Most recent VSS. Neuro exam nonfocal. Pending MRI and TTE today. - Terri Maxwell PA-C

## 2022-09-18 LAB
DRVVT SCREEN TO CONFIRM RATIO: ABNORMAL
LA NT DPL PPP QL: 45.3 SEC — SIGNIFICANT CHANGE UP
NORMALIZED SCT PPP-RTO: 1.02 RATIO — SIGNIFICANT CHANGE UP (ref 0–1.16)
NORMALIZED SCT PPP-RTO: SIGNIFICANT CHANGE UP

## 2022-09-18 RX ORDER — ATORVASTATIN CALCIUM 80 MG/1
1 TABLET, FILM COATED ORAL
Qty: 30 | Refills: 0
Start: 2022-09-18 | End: 2022-10-17

## 2022-09-18 RX ORDER — CLOPIDOGREL BISULFATE 75 MG/1
1 TABLET, FILM COATED ORAL
Qty: 21 | Refills: 0
Start: 2022-09-18 | End: 2022-10-08

## 2022-09-19 ENCOUNTER — APPOINTMENT (OUTPATIENT)
Dept: PULMONOLOGY | Facility: CLINIC | Age: 72
End: 2022-09-19
Payer: COMMERCIAL

## 2022-09-19 ENCOUNTER — NON-APPOINTMENT (OUTPATIENT)
Age: 72
End: 2022-09-19

## 2022-09-19 VITALS
DIASTOLIC BLOOD PRESSURE: 68 MMHG | OXYGEN SATURATION: 95 % | SYSTOLIC BLOOD PRESSURE: 107 MMHG | HEART RATE: 66 BPM | TEMPERATURE: 98.3 F

## 2022-09-19 DIAGNOSIS — G47.9 SLEEP DISORDER, UNSPECIFIED: ICD-10-CM

## 2022-09-19 PROCEDURE — 99203 OFFICE O/P NEW LOW 30 MIN: CPT

## 2022-09-19 PROCEDURE — 99204 OFFICE O/P NEW MOD 45 MIN: CPT

## 2022-09-19 RX ORDER — NIRMATRELVIR AND RITONAVIR 300-100 MG
20 X 150 MG & KIT ORAL
Qty: 30 | Refills: 0 | Status: DISCONTINUED | COMMUNITY
Start: 2022-08-24 | End: 2022-09-19

## 2022-09-19 NOTE — ASSESSMENT
[FreeTextEntry1] : Chronic insomnia issues.  Agree with patient that avoiding zolpidem is a good idea because he does not have an acute or short-term insomnia.  Use of long-term zolpidem will likely lead to dependence.  I do not object that much to his current regimen.  We had a long conversation about different kinds of sleep medications there indications there are downsides.  I cannot speak to the long-term safety of cannabis Gummies however their prescription and sleep has become if increasing frequency without a lot of data to backup the usage.  We did discuss the use of the antihistamine which does appear to help him.  He does find that they are restorative to him although I explained that most patients this is not the case these medications are basically once that basically help you pass the time but do not lead to improvement in daytime functioning however for this particular patient appeared to be useful I do not see the advantage of a prescription antihistamine at this time I would continue with Unisom and in fact I advised him to use it at bedtime rather than during awakening at 1 AM follow-up in 3 months depending on status may consider sleep  study or home sleep study to rule out NILDA

## 2022-09-19 NOTE — ED POST DISCHARGE NOTE - DETAILS
9/19/22 - Spoke with pt feeling much better now has no complaints.  Informed of S/C being positive and how tht may indicate an autoimmune process and he can discuss this with his PCP Natalio Covington with regard to further testing.  Sushma

## 2022-09-19 NOTE — HISTORY OF PRESENT ILLNESS
[TextBox_4] : Patient here for assessment and management of chronic insomnia chronic poor sleep has been on sleep medication for about 10 years.  Some occasional snoring and waking up with nasal stuffiness.  He notes difficulty both initiating sleep and maintaining sleep.  He denies depressive symptoms.  He is anxious but primarily about falling asleep.  He does not remember why he started taking sleeping pills this is been over 10 years currently he has been using a combination of Unisom and CBD Gummies.  He tends to take the Unisom in the middle of the night.  He goes to bed about 10 and wakes up about 4 AM.  He does note that he feels significantly tired before 10 PM and struggles to remain awake.  Snoring is not a major issue.\par Recently prescribed zolpidem but is reluctant to take because of concerns about dependence\par \par \par \par

## 2022-09-20 ENCOUNTER — NON-APPOINTMENT (OUTPATIENT)
Age: 72
End: 2022-09-20

## 2022-09-20 ENCOUNTER — APPOINTMENT (OUTPATIENT)
Dept: CARDIOLOGY | Facility: CLINIC | Age: 72
End: 2022-09-20

## 2022-09-20 VITALS
DIASTOLIC BLOOD PRESSURE: 66 MMHG | HEART RATE: 60 BPM | SYSTOLIC BLOOD PRESSURE: 110 MMHG | OXYGEN SATURATION: 100 % | WEIGHT: 165 LBS | BODY MASS INDEX: 23.01 KG/M2

## 2022-09-20 DIAGNOSIS — Z92.89 PERSONAL HISTORY OF OTHER MEDICAL TREATMENT: ICD-10-CM

## 2022-09-20 PROCEDURE — 93000 ELECTROCARDIOGRAM COMPLETE: CPT

## 2022-09-20 PROCEDURE — 99215 OFFICE O/P EST HI 40 MIN: CPT | Mod: 25

## 2022-09-22 ENCOUNTER — NON-APPOINTMENT (OUTPATIENT)
Age: 72
End: 2022-09-22

## 2022-09-22 ENCOUNTER — APPOINTMENT (OUTPATIENT)
Dept: CARDIOLOGY | Facility: CLINIC | Age: 72
End: 2022-09-22

## 2022-09-22 VITALS
OXYGEN SATURATION: 98 % | BODY MASS INDEX: 23.1 KG/M2 | SYSTOLIC BLOOD PRESSURE: 117 MMHG | HEART RATE: 56 BPM | HEIGHT: 71 IN | WEIGHT: 165 LBS | DIASTOLIC BLOOD PRESSURE: 71 MMHG

## 2022-09-22 DIAGNOSIS — Q24.8 OTHER SPECIFIED CONGENITAL MALFORMATIONS OF HEART: ICD-10-CM

## 2022-09-22 PROCEDURE — 99215 OFFICE O/P EST HI 40 MIN: CPT

## 2022-09-22 NOTE — HISTORY OF PRESENT ILLNESS
[FreeTextEntry1] : Patient is a 72 year old man in good health who presented with an acute stroke 9/2022.  this was documented on MRI.  CTA showed no cerebrovascular disease.  Patient's deficits rresolved and he was placed on aspirin and Plavix.  Workup for cryptogenic stroke is proceeding.  TTE with bubbles showed intracardiac right to left shunting consistent with a PFO.  He is scheduled for a possible ILR to look for occult atrial fibrillation.

## 2022-09-23 ENCOUNTER — NON-APPOINTMENT (OUTPATIENT)
Age: 72
End: 2022-09-23

## 2022-09-23 ENCOUNTER — APPOINTMENT (OUTPATIENT)
Dept: ELECTROPHYSIOLOGY | Facility: CLINIC | Age: 72
End: 2022-09-23

## 2022-09-23 VITALS
WEIGHT: 165 LBS | SYSTOLIC BLOOD PRESSURE: 108 MMHG | HEIGHT: 71 IN | HEART RATE: 53 BPM | OXYGEN SATURATION: 99 % | DIASTOLIC BLOOD PRESSURE: 66 MMHG | BODY MASS INDEX: 23.1 KG/M2

## 2022-09-23 PROBLEM — Z92.89 HISTORY OF RECENT HOSPITALIZATION: Status: ACTIVE | Noted: 2022-09-20

## 2022-09-23 PROCEDURE — 99203 OFFICE O/P NEW LOW 30 MIN: CPT

## 2022-09-23 NOTE — REVIEW OF SYSTEMS
[Negative] : Heme/Lymph [de-identified] : See HPI [de-identified] : Poor sleep - takes 1/2 a TCH gummy prior to be

## 2022-09-23 NOTE — CARDIOLOGY SUMMARY
[de-identified] : The patient declined and ECG this visit\par \par ECG from 9/20/2022: Sinus obi at 55bpm, normal axis, normal intervals, no ST/TW changes\par  [de-identified] : \par \par TTE from 9/17/2022: LA: 3.1, ELISA: 26, EF: 70%, mild MR, normal LV size and function, stage I diastolic dysfunction, normal RV size and function, min TR, evidence of a PFO\par \par \par TTE from 5/23/2017: EF: 60-65% [de-identified] : \par MRI brain from 9/17/2022: Small focus of diffusion restriction and increased T2 signal within the left motor cortex strongly suspicious for an area of acute or subacute infarction. [de-identified] : \par Bilateral LE Doppler from 9/17/2022: no DVT [de-identified] : \par Labs from 9/16/2022: Hb: 14.7, Plts: 173, Cr: 1.32 (GFR: 57), TSH: 4.8 (from 9/17/2022), TSH: 2.77 (from 6/6/2022)

## 2022-09-23 NOTE — DISCUSSION/SUMMARY
[FreeTextEntry4] : TIA [FreeTextEntry1] : Mr. Dennis Cogan is a 72 year old man with ulcerative colitis, chronic kidney disease stage III, prostate cancer (status-post a robotic prostatectomy) and a recent transient ischemic attack. He presents today for an evaluation for a loop recorder. Although he has a PFO he did not have LE DVTs detected during his admission for the TIA. \par \par We discussed the role of an ILR in patients with cryptogenic stroke. The risks, benefits and alternatives of an ILR were reviewed with the patient. Risks include, but are not limited to bleeding, infection and pain. The patient would like to proceed with the ILR. Will schedule.

## 2022-09-23 NOTE — HISTORY OF PRESENT ILLNESS
[FreeTextEntry1] : Patient presents today for follow-up. He is in his usual state of health. His erectile dysfunction had resolved without intervention. More recently, however, he noted an elevated PSA and 4K score. He is now status post robotic prostatectomy. He has urinary continence but needs Viagra for sexual function. He has resumed exercise after prostatectomy. He is exercising at approximately 80% of his prior intensity. He favors bike riding for aerobic exercise.\par \par Patient is up to date with colonoscopy, having had one in 2017. He follows with Dr. Dinero.\par \par June 201 - Patient returns today in his usual state of health. This is his first visit since 2019. He spent June-September in [place], but he spent the rest of the pandemic in [place], [place]. He and his wife drove back and forth. \par He continues to exercise regularly, including riding his bike and doing some resistance training at the gym (more so in [place] than in [place]). He also plays golf.\par He received both doses of Covid-19 vaccine in [place] (January and February).\par \par June 2022 - Patient returns today for follow-up in his usual state of health. He spent November -May in [place], [place].\par He continues to exercise regularly, including riding his bike and doing some resistance training at the gym. He also plays golf.\par He received both doses of Covid-19 vaccine in [place] (January and February 2021) and a Moderna booster sometime after that. He is considering a second booster.\par \par \par August 2022 - Patient returns today for follow-up. He is having worsening difficulty with falling asleep. He  tries to maintain consistent sleep hygiene, including reading before going to bed. He attributes the worsening of the problem to a recent trip to [place] that threw him off. He had been using Benadryl and a medical marijuana gummy, and that was working, until it stopped. Several weeks ago, he started taking Ambien 5 mg PRN, and he has found that very effective.\par He continues to exercise regularly, usually in the morning.\par He does not have any caffeine intake. \par PMD: BINH Valencia [phone]\par GI: Brent Dinero MD [phone]\par Urologist: Jere Duncan MD [phone] and Carlin Mckeon MD [phone]

## 2022-09-23 NOTE — REASON FOR VISIT
[FreeTextEntry3] : BINH Valencia [phone] [FreeTextEntry1] : 9/20/2022.\par Dennis Cogan returns today for follow up after a recent CVA. He is accompanied by his wife, Patricia.\par On Friday afternoon he was taken to [facility] for complaints of right sided weakness and aphasia which resolved after 10-20 minutes. HIs speech returned to normal first, followed by his left leg and then his left arm and hand.  In the ER a Stroke Code was activated. CT of the head was without acute findings and bedside NIH was 0. Subsequent MRI of the brain revealed a small focus of diffusion restriction and increased T2 signal within the left motor cortex strongly suspicious for an area of acute or subacute infarction.  He was sent to the Clinical Decision Unit before being discharged with aspirin, clopidogrel and high intensity statin therapy. Today he reports feeling well in general, but is a bit shaken about the event. He was seen by Dr. Romero last night who performed ultrasounds of his head and neck which are reported as normal. On Thursday he will be evaluated by Sergey Michele MD for possible PFO closure. \par

## 2022-09-23 NOTE — PHYSICAL EXAM
[Well Developed] : well developed [Well Nourished] : well nourished [No Acute Distress] : no acute distress [Normal Conjunctiva] : normal conjunctiva [Normal Venous Pressure] : normal venous pressure [No Carotid Bruit] : no carotid bruit [Normal S1, S2] : normal S1, S2 [No Murmur] : no murmur [No Rub] : no rub [No Gallop] : no gallop [Clear Lung Fields] : clear lung fields [Good Air Entry] : good air entry [No Respiratory Distress] : no respiratory distress  [Soft] : abdomen soft [Non Tender] : non-tender [No Masses/organomegaly] : no masses/organomegaly [Normal Bowel Sounds] : normal bowel sounds [Normal Gait] : normal gait [No Edema] : no edema [No Cyanosis] : no cyanosis [No Clubbing] : no clubbing [No Varicosities] : no varicosities [No Rash] : no rash [No Skin Lesions] : no skin lesions [Moves all extremities] : moves all extremities [No Focal Deficits] : no focal deficits [Normal Speech] : normal speech [Alert and Oriented] : alert and oriented [Normal memory] : normal memory [de-identified] : Regular rate and rhythm by palpation

## 2022-09-23 NOTE — CARDIOLOGY SUMMARY
[de-identified] : 4/28/2017, sinus bradycardia at 51 bpm, diffuse ST-T abnormality (nonspecific) \par 9/20/2022, sinus bradycardia at 55 bpm, normal axis, normal intervals and morphologies, normal R-wave progression.  [de-identified] : 5/23/2017, mild LV remodeling (increased wall thickness with normal LV mass), normal LV function, no pulmonary hypertension, normal LA size, mild mitral regurgitation LVEF 60-65%.\par \par 9/17/2022. TTE.\par Conclusions: \par 1. Normal left ventricular internal dimensions and wall\par thicknesses.\par 2. Normal left ventricular systolic function. No segmental\par wall motion abnormalities.\par 3. Normal right ventricular size and function.\par 4. Agitated saline injection and color flow Doppler\par demonstrates evidence of a patent foramen ovale.\par ------------------------------------------------------------------------\par Confirmed on  9/17/2022 - 12:24:57 by Kenyon Sheikh M.D.\par

## 2022-09-23 NOTE — HISTORY OF PRESENT ILLNESS
[FreeTextEntry1] : Mr. Dennis Cogan is a 72 year old man with ulcerative colitis, chronic kidney disease stage III, prostate cancer (status-post a robotic prostatectomy) and a recent transient ischemic attack. He presented to [facility] on 9/16/2022 with transient aphasia and right upper and lower extremity weakness. A Stroke Code was called. He was deemed not to be a candidate for tPA as his NIHSS was 0 (symptoms had resolved by the time he as evaluated in the Emergency Department). An MRI of the brain from 9/17/2022 demonstrated a small focus of diffusion restriction and increased T2 signal within the left motor cortex that was strongly suspicious for an acute or subacute infarction. \par \par The patient presents today for an initial consultation to discuss an implantable loop recorder. Of note, a transthoracic echocardiogram performed on 9/17/2022 demonstrated the presence of a PFO. He saw Dr. Sergey Kauffman in consultation on 9/22/2022 - a KIRTI is planned. He denies palpitations. He drinks three glasses of wine weekly and denies waking up in the middle of the night gasping for breath. No reports of snoring. He is active - rides bikes with his wife without symptoms.

## 2022-09-23 NOTE — DISCUSSION/SUMMARY
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 \par His EKG shows sinus bradycardia consistent with his conditioning (he rides more than 20 miles on a bike several times per week). It is unchanged from prior.\par \par We discussed the mechanism of acute stroke in terms of possible causes including, plaque rupture within the vessels of the brain, distal thrombus dislodgement or underlying arrhythmia.\par \par Continue low dose aspirin, clopidogrel and high intensity statin therapy for patient with recent stroke. \par Most recent  mg/dL. Goal LDL now < 70 mg/dL.\par \par He will follow up with Dr. Watson Kauffman for potential PFO closure later this week.\par He has been referred to electrophysiology for loop recorder assessment to screen for any paroxysmal atrial fibrillation. He understands that should silent AF be detected, his management would require anticoagulation with NOAC.\par \par Follow up in 4 weeks.

## 2022-09-29 ENCOUNTER — TRANSCRIPTION ENCOUNTER (OUTPATIENT)
Age: 72
End: 2022-09-29

## 2022-09-29 ENCOUNTER — OUTPATIENT (OUTPATIENT)
Dept: OUTPATIENT SERVICES | Facility: HOSPITAL | Age: 72
LOS: 1 days | End: 2022-09-29
Payer: COMMERCIAL

## 2022-09-29 VITALS
SYSTOLIC BLOOD PRESSURE: 118 MMHG | DIASTOLIC BLOOD PRESSURE: 69 MMHG | HEIGHT: 71 IN | HEART RATE: 58 BPM | RESPIRATION RATE: 18 BRPM | TEMPERATURE: 98 F | OXYGEN SATURATION: 100 % | WEIGHT: 164.91 LBS

## 2022-09-29 VITALS
OXYGEN SATURATION: 98 % | DIASTOLIC BLOOD PRESSURE: 73 MMHG | SYSTOLIC BLOOD PRESSURE: 136 MMHG | HEART RATE: 60 BPM | RESPIRATION RATE: 16 BRPM

## 2022-09-29 DIAGNOSIS — Z90.89 ACQUIRED ABSENCE OF OTHER ORGANS: Chronic | ICD-10-CM

## 2022-09-29 DIAGNOSIS — I63.9 CEREBRAL INFARCTION, UNSPECIFIED: ICD-10-CM

## 2022-09-29 PROCEDURE — 33285 INSJ SUBQ CAR RHYTHM MNTR: CPT

## 2022-09-29 PROCEDURE — C1764: CPT

## 2022-09-29 RX ORDER — GLUTAMINE 5 G/1
1 POWDER, FOR SOLUTION ORAL
Qty: 0 | Refills: 0 | DISCHARGE

## 2022-09-29 RX ORDER — L.ACIDOPH/B.ANIMALIS/B.LONGUM 15B CELL
1 CAPSULE ORAL
Qty: 0 | Refills: 0 | DISCHARGE

## 2022-09-29 RX ORDER — TETRAHYDROZOLINE/POLYETHYL GLY 0.05 %-1 %
1 DROPS OPHTHALMIC (EYE)
Qty: 0 | Refills: 0 | DISCHARGE

## 2022-09-29 RX ORDER — CEFAZOLIN SODIUM 1 G
2000 VIAL (EA) INJECTION ONCE
Refills: 0 | Status: COMPLETED | OUTPATIENT
Start: 2022-09-29 | End: 2022-09-29

## 2022-09-29 RX ORDER — THYROID 120 MG
45 TABLET ORAL
Qty: 0 | Refills: 0 | DISCHARGE

## 2022-09-29 RX ORDER — FLUTICASONE PROPIONATE 50 MCG
1 SPRAY, SUSPENSION NASAL
Qty: 0 | Refills: 0 | DISCHARGE

## 2022-09-29 RX ORDER — MULTIVIT WITH MIN/MFOLATE/K2 340-15/3 G
1 POWDER (GRAM) ORAL
Qty: 0 | Refills: 0 | DISCHARGE

## 2022-09-29 RX ORDER — OMEGA-3 ACID ETHYL ESTERS 1 G
1 CAPSULE ORAL
Qty: 0 | Refills: 0 | DISCHARGE

## 2022-09-29 RX ADMIN — Medication 100 MILLIGRAM(S): at 08:33

## 2022-09-29 NOTE — ASU DISCHARGE PLAN (ADULT/PEDIATRIC) - ASU DC SPECIAL INSTRUCTIONSFT
WOUND CARE:  Do NOT scrub, rub, or pick at your incision site  the glue which  wear off in 5-7 days  do not get your incision wet for 3 days   AFTER 3 days you may shower:   - use mild soap and gentle warm stream, pat dry with towel  DO NOT apply lotions, powders, ointment, or perfumes to incision  wear loose clothing around incision for 7 days  f/u appointment as instructed     ACTIVITY:   resume normal activities    Follow heart healthy diet recommended by your doctor, , if you smoke STOP SMOKING ( may call [phone] for center of tobacco control if you need assistance)     ***CALL YOUR DOCTOR***  if you experience: fever, chills, body aches, or severe pain, swelling, redness, heat or yellow discharge at incision site  If you are unable to reach your doctor, you may contact Cardiology Office at [facility] at [phone]

## 2022-09-29 NOTE — ASU DISCHARGE PLAN (ADULT/PEDIATRIC) - NS MD DC FALL RISK RISK
For information on Fall & Injury Prevention, visit: [email or web address] OR  [email or web address] OR  https://www.cdc.gov/steadi/patient.html

## 2022-09-29 NOTE — ASU DISCHARGE PLAN (ADULT/PEDIATRIC) - CARE PROVIDER_API CALL
wound check appointment,   At [address]  Phone: (   )    -  Fax: (   )    -  Established Patient  Scheduled Appointment: 10/20/2022 08:40 AM

## 2022-09-29 NOTE — ASU DISCHARGE PLAN (ADULT/PEDIATRIC) - PROVIDER TOKENS
FREE:[LAST:[wound check appointment],PHONE:[(   )    -],FAX:[(   )    -],ADDRESS:[At [address]],SCHEDULEDAPPT:[10/20/2022],SCHEDULEDAPPTTIME:[08:40 AM],ESTABLISHEDPATIENT:[T]]

## 2022-09-30 ENCOUNTER — NON-APPOINTMENT (OUTPATIENT)
Age: 72
End: 2022-09-30

## 2022-10-11 ENCOUNTER — OUTPATIENT (OUTPATIENT)
Dept: OUTPATIENT SERVICES | Facility: HOSPITAL | Age: 72
LOS: 1 days | End: 2022-10-11
Payer: COMMERCIAL

## 2022-10-11 ENCOUNTER — APPOINTMENT (OUTPATIENT)
Dept: CV DIAGNOSITCS | Facility: HOSPITAL | Age: 72
End: 2022-10-11

## 2022-10-11 ENCOUNTER — FORM ENCOUNTER (OUTPATIENT)
Age: 72
End: 2022-10-11

## 2022-10-11 VITALS
RESPIRATION RATE: 16 BRPM | SYSTOLIC BLOOD PRESSURE: 106 MMHG | OXYGEN SATURATION: 99 % | HEART RATE: 49 BPM | TEMPERATURE: 97 F | DIASTOLIC BLOOD PRESSURE: 64 MMHG

## 2022-10-11 VITALS
HEART RATE: 56 BPM | OXYGEN SATURATION: 96 % | SYSTOLIC BLOOD PRESSURE: 114 MMHG | RESPIRATION RATE: 18 BRPM | DIASTOLIC BLOOD PRESSURE: 79 MMHG

## 2022-10-11 DIAGNOSIS — Z90.89 ACQUIRED ABSENCE OF OTHER ORGANS: Chronic | ICD-10-CM

## 2022-10-11 DIAGNOSIS — I63.9 CEREBRAL INFARCTION, UNSPECIFIED: ICD-10-CM

## 2022-10-11 PROCEDURE — 93312 ECHO TRANSESOPHAGEAL: CPT

## 2022-10-11 PROCEDURE — 93320 DOPPLER ECHO COMPLETE: CPT | Mod: 26

## 2022-10-11 PROCEDURE — 93325 DOPPLER ECHO COLOR FLOW MAPG: CPT | Mod: 26

## 2022-10-11 PROCEDURE — 93312 ECHO TRANSESOPHAGEAL: CPT | Mod: 26

## 2022-10-11 PROCEDURE — 93325 DOPPLER ECHO COLOR FLOW MAPG: CPT

## 2022-10-11 PROCEDURE — 93320 DOPPLER ECHO COMPLETE: CPT

## 2022-10-12 ENCOUNTER — APPOINTMENT (OUTPATIENT)
Dept: CARDIOLOGY | Facility: CLINIC | Age: 72
End: 2022-10-12

## 2022-10-18 ENCOUNTER — APPOINTMENT (OUTPATIENT)
Dept: CARDIOLOGY | Facility: CLINIC | Age: 72
End: 2022-10-18

## 2022-10-18 VITALS — DIASTOLIC BLOOD PRESSURE: 74 MMHG | SYSTOLIC BLOOD PRESSURE: 126 MMHG

## 2022-10-18 PROCEDURE — 99214 OFFICE O/P EST MOD 30 MIN: CPT

## 2022-10-18 RX ORDER — ASPIRIN 81 MG/1
81 TABLET ORAL
Refills: 0 | Status: ACTIVE | COMMUNITY
Start: 2022-10-18

## 2022-10-20 ENCOUNTER — NON-APPOINTMENT (OUTPATIENT)
Age: 72
End: 2022-10-20

## 2022-10-20 ENCOUNTER — APPOINTMENT (OUTPATIENT)
Dept: ELECTROPHYSIOLOGY | Facility: CLINIC | Age: 72
End: 2022-10-20

## 2022-10-20 VITALS
WEIGHT: 165 LBS | DIASTOLIC BLOOD PRESSURE: 65 MMHG | HEART RATE: 59 BPM | BODY MASS INDEX: 23.01 KG/M2 | SYSTOLIC BLOOD PRESSURE: 107 MMHG

## 2022-10-20 PROCEDURE — 93291 INTERROG DEV EVAL SCRMS IP: CPT

## 2022-10-20 PROCEDURE — 93000 ELECTROCARDIOGRAM COMPLETE: CPT | Mod: 59

## 2022-11-04 NOTE — HISTORY OF PRESENT ILLNESS
[FreeTextEntry1] : Patient presents today for follow-up. He is in his usual state of health. His erectile dysfunction had resolved without intervention. More recently, however, he noted an elevated PSA and 4K score. He is now status post robotic prostatectomy. He has urinary continence but needs Viagra for sexual function. He has resumed exercise after prostatectomy. He is exercising at approximately 80% of his prior intensity. He favors bike riding for aerobic exercise.\par \par Patient is up to date with colonoscopy, having had one in 2017. He follows with Dr. Dinero.\par \par June 201 - Patient returns today in his usual state of health. This is his first visit since 2019. He spent June-[place] in [place], but he spent the rest of the pandemic in [place], [place]. He and his wife drove back and forth. \par He continues to exercise regularly, including riding his bike and doing some resistance training at the gym (more so in [place] than in [place]). He also plays golf.\par He received both doses of Covid-19 vaccine in [place] (January and February).\par \par June 2022 - Patient returns today for follow-up in his usual state of health. He spent November -May in [place], [place].\par He continues to exercise regularly, including riding his bike and doing some resistance training at the gym. He also plays golf.\par He received both doses of Covid-19 vaccine in [place] (January and February 2021) and a Moderna booster sometime after that. He is considering a second booster.\par \par \par August 2022 - Patient returns today for follow-up. He is having worsening difficulty with falling asleep. He  tries to maintain consistent sleep hygiene, including reading before going to bed. He attributes the worsening of the problem to a recent trip to [place] that threw him off. He had been using Benadryl and a medical marijuana gummy, and that was working, until it stopped. Several weeks ago, he started taking Ambien 5 mg PRN, and he has found that very effective.\par He continues to exercise regularly, usually in the morning.\par He does not have any caffeine intake. \par \par 9/20/2022.\par Dennis Cogan returns today for follow up after a recent CVA. He is accompanied by his wife, Patricia.\par On Friday afternoon he was taken to [facility] for complaints of right sided weakness and aphasia which resolved after 10-20 minutes. HIs speech returned to normal first, followed by his left leg and then his left arm and hand.  In the ER a Stroke Code was activated. CT of the head was without acute findings and bedside NIH was 0. Subsequent MRI of the brain revealed a small focus of diffusion restriction and increased T2 signal within the left motor cortex strongly suspicious for an area of acute or subacute infarction.  He was sent to the Clinical Decision Unit before being discharged with aspirin, clopidogrel and high intensity statin therapy. Today he reports feeling well in general, but is a bit shaken about the event. He was seen by Dr. Romero last night who performed ultrasounds of his head and neck which are reported as normal. On Thursday he will be evaluated by Sergey Michele MD for possible PFO closure. \par \par PMD: BINH Valencia [phone]\par GI: Brent Dinero MD [phone]\par Urologist: Jere Duncan MD [phone] and Carlin Mckeon MD [phone]\par \par

## 2022-11-04 NOTE — CARDIOLOGY SUMMARY
[de-identified] : 4/28/2017, sinus bradycardia at 51 bpm, diffuse ST-T abnormality (nonspecific) \par 9/20/2022, sinus bradycardia at 55 bpm, normal axis, normal intervals and morphologies, normal R-wave progression.  [de-identified] : 5/23/2017, mild LV remodeling (increased wall thickness with normal LV mass), normal LV function, no pulmonary hypertension, normal LA size, mild mitral regurgitation LVEF 60-65%.\par \par 9/17/2022. TTE.\par Conclusions: \par 1. Normal left ventricular internal dimensions and wall\par thicknesses.\par 2. Normal left ventricular systolic function. No segmental\par wall motion abnormalities.\par 3. Normal right ventricular size and function.\par 4. Agitated saline injection and color flow Doppler\par demonstrates evidence of a patent foramen ovale.\par ------------------------------------------------------------------------\par Confirmed on  9/17/2022 - 12:24:57 by Kenyon Sheikh M.D.\par \par PROCEDURE: Transesophageal (KIRTI) was performed.  Informed\par consent was first obtained for KIRTI. The patient was sedated\par - see anesthesia record.  The procedure was monitored with\par automatic blood pressure monitoring, ECG tracings and pulse\par oximetry. The transesophageal probe was placed in the\par esophagus posterior to the heart without complications.  \par INDICATION: Atrial septal defect (Q21.1), Other\par cerebrovascular disease (I67.89)\par ------------------------------------------------------------------------\par Dimensions:    Normal Values:\par LA:            2.0 - 4.0 cm\par Ao:            2.0 - 3.8 cm\par SEPTUM:        0.6 - 1.2 cm\par PWT:           0.6 - 1.1 cm\par LVIDd:         3.0 - 5.6 cm\par LVIDs:         1.8 - 4.0 cm\par EF (Carey Method): 70 %\par ------------------------------------------------------------------------\par Observations:\par Mitral Valve: Normal mitral valve. Mild mitral\par regurgitation. \par Aortic Valve/Aorta: Normal trileaflet aortic valve. Minimal\par aortic regurgitation.  Peak left ventricular outflow tract\par gradient equals 6 mm Hg, mean gradient is equal to 3 mm Hg,\par LVOT velocity time integral equals 27 cm.\par Simple atheroma noted in aortic arch/descending aorta.\par Left Atrium: Normal left atrium.   No left atrial or left\par atrial appendage thrombus.\par Left Ventricle: Normal left ventricular systolic function.\par No segmental wall motion abnormalities. Normal left\par ventricular internal dimensions and wall thicknesses.\par Right Heart: Normal right atrium. Normal right ventricular\par size and function. Normal tricuspid valve. Normal pulmonic\par valve.\par Pericardium/Pleura: Normal pericardium with no pericardial\par effusion.\par Hemodynamic: Estimated right atrial pressure is 8 mm Hg.\par Agitated saline injection and color flow Doppler\par demonstrates evidence of a patent foramen ovale.  PFO\par liftoff height 0.32 cm.\par ------------------------------------------------------------------------\par Conclusions: \par 1. Normal left ventricular internal dimensions and wall\par thicknesses.\par 2. Normal left ventricular systolic function. No segmental\par wall motion abnormalities.\par 3. Normal right ventricular size and function.\par 4. Agitated saline injection and color flow Doppler\par demonstrates evidence of a patent foramen ovale.  PFO\par liftoff height 0.32 cm.\par ------------------------------------------------------------------------\par Confirmed on  10/11/2022 - 14:27:59 by Kenyon Sheikh M.D.\par FASE\par \par

## 2022-11-04 NOTE — REASON FOR VISIT
[FreeTextEntry3] : BINH Valencia [phone] [FreeTextEntry1] : 10/18/2022.\par Dennis Cogan returns today for routine scheduled follow up. He is accompanied by his wife, Patricia.\par Since last visit he has been evaluated by Dr. Sergey Kauffman who performed a KIRTI in anticipation of PFO closure. He has also been evaluated by Dr. Uriel Knight who implanted a loop recorder on 9/29/2022 following a recent cryptogenic stroke. Today he is feeling well. He remains hesitant to resume his prior exercise in light of his recent events. He denies any chest discomfort, shortness of breath, palpitations, lightheadedness or syncope and reports no issues with bruising or bleeding on dual antiplatelet therapy.\par

## 2022-11-04 NOTE — END OF VISIT
[Time Spent: ___ minutes] : I have spent [unfilled] minutes of time on the encounter. [FreeTextEntry3] : I saw the patient with Clarita Rondon NP and I agree with the findings and plan as above.

## 2022-11-14 ENCOUNTER — OUTPATIENT (OUTPATIENT)
Dept: OUTPATIENT SERVICES | Facility: HOSPITAL | Age: 72
LOS: 1 days | End: 2022-11-14
Payer: COMMERCIAL

## 2022-11-14 DIAGNOSIS — Z90.89 ACQUIRED ABSENCE OF OTHER ORGANS: Chronic | ICD-10-CM

## 2022-11-14 DIAGNOSIS — Z11.52 ENCOUNTER FOR SCREENING FOR COVID-19: ICD-10-CM

## 2022-11-14 LAB — SARS-COV-2 RNA SPEC QL NAA+PROBE: SIGNIFICANT CHANGE UP

## 2022-11-14 PROCEDURE — C9803: CPT

## 2022-11-14 PROCEDURE — U0005: CPT

## 2022-11-14 PROCEDURE — U0003: CPT

## 2022-11-16 ENCOUNTER — APPOINTMENT (OUTPATIENT)
Dept: CV DIAGNOSITCS | Facility: HOSPITAL | Age: 72
End: 2022-11-16

## 2022-11-16 ENCOUNTER — TRANSCRIPTION ENCOUNTER (OUTPATIENT)
Age: 72
End: 2022-11-16

## 2022-11-16 ENCOUNTER — OUTPATIENT (OUTPATIENT)
Dept: OUTPATIENT SERVICES | Facility: HOSPITAL | Age: 72
LOS: 1 days | End: 2022-11-16
Payer: COMMERCIAL

## 2022-11-16 VITALS
SYSTOLIC BLOOD PRESSURE: 121 MMHG | HEIGHT: 71 IN | DIASTOLIC BLOOD PRESSURE: 74 MMHG | HEART RATE: 60 BPM | RESPIRATION RATE: 16 BRPM | TEMPERATURE: 98 F | OXYGEN SATURATION: 98 % | WEIGHT: 164.91 LBS

## 2022-11-16 VITALS
TEMPERATURE: 98 F | OXYGEN SATURATION: 98 % | DIASTOLIC BLOOD PRESSURE: 66 MMHG | SYSTOLIC BLOOD PRESSURE: 122 MMHG | RESPIRATION RATE: 15 BRPM | HEART RATE: 60 BPM

## 2022-11-16 DIAGNOSIS — Q21.10 ATRIAL SEPTAL DEFECT, UNSPECIFIED: ICD-10-CM

## 2022-11-16 DIAGNOSIS — Z90.89 ACQUIRED ABSENCE OF OTHER ORGANS: Chronic | ICD-10-CM

## 2022-11-16 LAB
ALBUMIN SERPL ELPH-MCNC: 4.1 G/DL — SIGNIFICANT CHANGE UP (ref 3.3–5)
ALP SERPL-CCNC: 105 U/L — SIGNIFICANT CHANGE UP (ref 40–120)
ALT FLD-CCNC: 16 U/L — SIGNIFICANT CHANGE UP (ref 10–45)
ANION GAP SERPL CALC-SCNC: 10 MMOL/L — SIGNIFICANT CHANGE UP (ref 5–17)
AST SERPL-CCNC: 17 U/L — SIGNIFICANT CHANGE UP (ref 10–40)
BILIRUB SERPL-MCNC: 0.7 MG/DL — SIGNIFICANT CHANGE UP (ref 0.2–1.2)
BUN SERPL-MCNC: 26 MG/DL — HIGH (ref 7–23)
CALCIUM SERPL-MCNC: 8.6 MG/DL — SIGNIFICANT CHANGE UP (ref 8.4–10.5)
CHLORIDE SERPL-SCNC: 106 MMOL/L — SIGNIFICANT CHANGE UP (ref 96–108)
CO2 SERPL-SCNC: 25 MMOL/L — SIGNIFICANT CHANGE UP (ref 22–31)
CREAT SERPL-MCNC: 1.24 MG/DL — SIGNIFICANT CHANGE UP (ref 0.5–1.3)
EGFR: 62 ML/MIN/1.73M2 — SIGNIFICANT CHANGE UP
GLUCOSE SERPL-MCNC: 89 MG/DL — SIGNIFICANT CHANGE UP (ref 70–99)
HCT VFR BLD CALC: 46 % — SIGNIFICANT CHANGE UP (ref 39–50)
HGB BLD-MCNC: 15.3 G/DL — SIGNIFICANT CHANGE UP (ref 13–17)
MAGNESIUM SERPL-MCNC: 2.2 MG/DL — SIGNIFICANT CHANGE UP (ref 1.6–2.6)
MCHC RBC-ENTMCNC: 33.3 GM/DL — SIGNIFICANT CHANGE UP (ref 32–36)
MCHC RBC-ENTMCNC: 33.8 PG — SIGNIFICANT CHANGE UP (ref 27–34)
MCV RBC AUTO: 101.8 FL — HIGH (ref 80–100)
NRBC # BLD: 0 /100 WBCS — SIGNIFICANT CHANGE UP (ref 0–0)
PHOSPHATE SERPL-MCNC: 2.7 MG/DL — SIGNIFICANT CHANGE UP (ref 2.5–4.5)
PLATELET # BLD AUTO: 172 K/UL — SIGNIFICANT CHANGE UP (ref 150–400)
POTASSIUM SERPL-MCNC: 3.7 MMOL/L — SIGNIFICANT CHANGE UP (ref 3.5–5.3)
POTASSIUM SERPL-SCNC: 3.7 MMOL/L — SIGNIFICANT CHANGE UP (ref 3.5–5.3)
PROT SERPL-MCNC: 6.7 G/DL — SIGNIFICANT CHANGE UP (ref 6–8.3)
RBC # BLD: 4.52 M/UL — SIGNIFICANT CHANGE UP (ref 4.2–5.8)
RBC # FLD: 13.1 % — SIGNIFICANT CHANGE UP (ref 10.3–14.5)
SODIUM SERPL-SCNC: 141 MMOL/L — SIGNIFICANT CHANGE UP (ref 135–145)
WBC # BLD: 5.8 K/UL — SIGNIFICANT CHANGE UP (ref 3.8–10.5)
WBC # FLD AUTO: 5.8 K/UL — SIGNIFICANT CHANGE UP (ref 3.8–10.5)

## 2022-11-16 PROCEDURE — C1887: CPT

## 2022-11-16 PROCEDURE — 93005 ELECTROCARDIOGRAM TRACING: CPT

## 2022-11-16 PROCEDURE — 80053 COMPREHEN METABOLIC PANEL: CPT

## 2022-11-16 PROCEDURE — 36415 COLL VENOUS BLD VENIPUNCTURE: CPT

## 2022-11-16 PROCEDURE — C1766: CPT

## 2022-11-16 PROCEDURE — C1894: CPT

## 2022-11-16 PROCEDURE — 84100 ASSAY OF PHOSPHORUS: CPT

## 2022-11-16 PROCEDURE — C1769: CPT

## 2022-11-16 PROCEDURE — 93580 TRANSCATH CLOSURE OF ASD: CPT

## 2022-11-16 PROCEDURE — 99152 MOD SED SAME PHYS/QHP 5/>YRS: CPT

## 2022-11-16 PROCEDURE — C1759: CPT

## 2022-11-16 PROCEDURE — 83735 ASSAY OF MAGNESIUM: CPT

## 2022-11-16 PROCEDURE — C1817: CPT

## 2022-11-16 PROCEDURE — 85027 COMPLETE CBC AUTOMATED: CPT

## 2022-11-16 PROCEDURE — 93010 ELECTROCARDIOGRAM REPORT: CPT | Mod: 76

## 2022-11-16 RX ORDER — ASPIRIN/CALCIUM CARB/MAGNESIUM 324 MG
81 TABLET ORAL DAILY
Refills: 0 | Status: DISCONTINUED | OUTPATIENT
Start: 2022-11-16 | End: 2022-11-16

## 2022-11-16 RX ORDER — ASPIRIN/CALCIUM CARB/MAGNESIUM 324 MG
1 TABLET ORAL
Qty: 0 | Refills: 0 | DISCHARGE

## 2022-11-16 RX ORDER — MESALAMINE 400 MG
60 TABLET, DELAYED RELEASE (ENTERIC COATED) ORAL
Qty: 0 | Refills: 0 | DISCHARGE

## 2022-11-16 RX ORDER — CLOPIDOGREL BISULFATE 75 MG/1
75 TABLET, FILM COATED ORAL DAILY
Refills: 0 | Status: DISCONTINUED | OUTPATIENT
Start: 2022-11-16 | End: 2022-11-30

## 2022-11-16 RX ORDER — ATORVASTATIN CALCIUM 80 MG/1
1 TABLET, FILM COATED ORAL
Qty: 0 | Refills: 0 | DISCHARGE

## 2022-11-16 RX ORDER — ASPIRIN/CALCIUM CARB/MAGNESIUM 324 MG
81 TABLET ORAL DAILY
Refills: 0 | Status: DISCONTINUED | OUTPATIENT
Start: 2022-11-16 | End: 2022-11-30

## 2022-11-16 RX ORDER — MESALAMINE 400 MG
4 TABLET, DELAYED RELEASE (ENTERIC COATED) ORAL
Qty: 0 | Refills: 0 | DISCHARGE

## 2022-11-16 RX ORDER — ATORVASTATIN CALCIUM 80 MG/1
40 TABLET, FILM COATED ORAL AT BEDTIME
Refills: 0 | Status: DISCONTINUED | OUTPATIENT
Start: 2022-11-16 | End: 2022-11-30

## 2022-11-16 RX ORDER — CEFAZOLIN SODIUM 1 G
2000 VIAL (EA) INJECTION ONCE
Refills: 0 | Status: COMPLETED | OUTPATIENT
Start: 2022-11-16 | End: 2022-11-16

## 2022-11-16 RX ADMIN — Medication 81 MILLIGRAM(S): at 07:24

## 2022-11-16 RX ADMIN — CLOPIDOGREL BISULFATE 75 MILLIGRAM(S): 75 TABLET, FILM COATED ORAL at 07:24

## 2022-11-16 RX ADMIN — Medication 100 MILLIGRAM(S): at 16:01

## 2022-11-16 NOTE — H&P CARDIOLOGY - NSICDXFAMILYHX_GEN_ALL_CORE_FT
Dr. Perea on panel call with Lachelle Colunga NP. FAMILY HISTORY:  Father  Still living? No  Family history of CHF (congestive heart failure), Age at diagnosis: Age Unknown    Aunt  Still living? No  Family history of diabetes mellitus, Age at diagnosis: Age Unknown  Family history of lung cancer, Age at diagnosis: Age Unknown  Family history of multiple myeloma, Age at diagnosis: Age Unknown

## 2022-11-16 NOTE — ASU DISCHARGE PLAN (ADULT/PEDIATRIC) - CARE PROVIDER_API CALL
Natalio Covington)  Cardiology; Internal Medicine  [address], [address]  [place] [address]  Phone: [phone]  Fax: [phone]  Established Patient  Follow Up Time: 2 weeks

## 2022-11-16 NOTE — H&P CARDIOLOGY - REVIEW OF SYSTEMS
General: No fatigue, no fevers/chills  Respiratory: No dyspnea, no cough, no wheeze  CV: No chest pain, no palpitations  Abd: No nausea  Neuro: No headache, no dizziness

## 2022-11-16 NOTE — ASU DISCHARGE PLAN (ADULT/PEDIATRIC) - ASU DC SPECIAL INSTRUCTIONSFT
Please see  pre printed discharge instructions sheet.    You will need anitbiotics piror to any dental work for the next  6mos

## 2022-11-16 NOTE — H&P CARDIOLOGY - HISTORY OF PRESENT ILLNESS
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 Natalio Covington. Pt denies any c/o CP, SOB palpitations or fever, and is in no distress.    Cardiac Diagnostics:  EKG- Sinus obi HR 51 diffuse S-T abnormality  Echo-9/17/22-Normal LV function and wall thickness, No segmental wall abnormalities, Color flow doppler demonstrates evidence of patent foramen ovale EF 70%

## 2022-11-16 NOTE — CHART NOTE - NSCHARTNOTEFT_GEN_A_CORE
Removal of Femoral Sheath    Pulses in the right lower extremity are palpable. The patient was placed in the supine position. The insertion site was identified and the sutures were removed per protocol.  The venous femoral sheath was  then removed followed by the second venous femoral sheath . Direct pressure was applied for 15 minutes.     Monitoring of the right groin and both lower extremities including neuro-vascular checks and vital signs every 15 minutes x 4, then every 30 minutes x 2, then every 1 hour was ordered.    No complications .

## 2022-11-16 NOTE — ASU DISCHARGE PLAN (ADULT/PEDIATRIC) - NS MD DC FALL RISK RISK
For information on Fall & Injury Prevention, visit: [email or web address] OR  [email or web address] OR  https://www.cdc.gov/steadi/patient.html

## 2022-11-21 ENCOUNTER — NON-APPOINTMENT (OUTPATIENT)
Age: 72
End: 2022-11-21

## 2022-11-21 ENCOUNTER — APPOINTMENT (OUTPATIENT)
Dept: ELECTROPHYSIOLOGY | Facility: CLINIC | Age: 72
End: 2022-11-21

## 2022-11-21 ENCOUNTER — APPOINTMENT (OUTPATIENT)
Dept: PULMONOLOGY | Facility: CLINIC | Age: 72
End: 2022-11-21

## 2022-11-21 PROCEDURE — 93298 REM INTERROG DEV EVAL SCRMS: CPT

## 2022-11-21 PROCEDURE — G2066: CPT

## 2022-11-28 ENCOUNTER — APPOINTMENT (OUTPATIENT)
Dept: CARDIOLOGY | Facility: CLINIC | Age: 72
End: 2022-11-28

## 2022-11-28 ENCOUNTER — NON-APPOINTMENT (OUTPATIENT)
Age: 72
End: 2022-11-28

## 2022-11-28 VITALS
BODY MASS INDEX: 23.1 KG/M2 | OXYGEN SATURATION: 97 % | WEIGHT: 165 LBS | SYSTOLIC BLOOD PRESSURE: 108 MMHG | DIASTOLIC BLOOD PRESSURE: 70 MMHG | RESPIRATION RATE: 16 BRPM | HEART RATE: 54 BPM | HEIGHT: 71 IN

## 2022-11-28 PROCEDURE — 99213 OFFICE O/P EST LOW 20 MIN: CPT | Mod: 25

## 2022-11-28 PROCEDURE — 93000 ELECTROCARDIOGRAM COMPLETE: CPT

## 2022-11-28 RX ORDER — ATORVASTATIN CALCIUM 80 MG/1
80 TABLET, FILM COATED ORAL
Qty: 30 | Refills: 0 | Status: DISCONTINUED | COMMUNITY
Start: 2022-09-18

## 2022-12-07 NOTE — HISTORY OF PRESENT ILLNESS
[FreeTextEntry1] : 72 year old man s/p PFO closure 11/16/2022.  Since procedure, he has been fine with no chest discomfort, dyspnea or palpitations.

## 2022-12-22 ENCOUNTER — APPOINTMENT (OUTPATIENT)
Dept: ELECTROPHYSIOLOGY | Facility: CLINIC | Age: 72
End: 2022-12-22

## 2022-12-22 ENCOUNTER — NON-APPOINTMENT (OUTPATIENT)
Age: 72
End: 2022-12-22

## 2022-12-22 PROCEDURE — G2066: CPT

## 2022-12-22 PROCEDURE — 93298 REM INTERROG DEV EVAL SCRMS: CPT

## 2023-01-04 NOTE — PROGRESS NOTE ADULT - PROBLEM SELECTOR PROBLEM 1
Malignant neoplasm of prostate
0 = understands/communicates without difficulty

## 2023-01-11 ENCOUNTER — APPOINTMENT (RX ONLY)
Dept: URBAN - METROPOLITAN AREA CLINIC 94 | Facility: CLINIC | Age: 73
Setting detail: DERMATOLOGY
End: 2023-01-11

## 2023-01-11 DIAGNOSIS — L82.0 INFLAMED SEBORRHEIC KERATOSIS: ICD-10-CM

## 2023-01-11 DIAGNOSIS — Z85.828 PERSONAL HISTORY OF OTHER MALIGNANT NEOPLASM OF SKIN: ICD-10-CM

## 2023-01-11 DIAGNOSIS — L70.0 ACNE VULGARIS: ICD-10-CM

## 2023-01-11 DIAGNOSIS — Z86.007 PERSONAL HISTORY OF IN-SITU NEOPLASM OF SKIN: ICD-10-CM

## 2023-01-11 DIAGNOSIS — L57.0 ACTINIC KERATOSIS: ICD-10-CM

## 2023-01-11 DIAGNOSIS — L82.1 OTHER SEBORRHEIC KERATOSIS: ICD-10-CM

## 2023-01-11 PROCEDURE — ? ADDITIONAL NOTES

## 2023-01-11 PROCEDURE — ? LIQUID NITROGEN

## 2023-01-11 PROCEDURE — 10040 EXTRACTION: CPT | Mod: NC

## 2023-01-11 PROCEDURE — ? ACNE SURGERY

## 2023-01-11 PROCEDURE — ? COUNSELING

## 2023-01-11 PROCEDURE — 17003 DESTRUCT PREMALG LES 2-14: CPT | Mod: 59

## 2023-01-11 PROCEDURE — 17110 DESTRUCTION B9 LES UP TO 14: CPT

## 2023-01-11 PROCEDURE — 17000 DESTRUCT PREMALG LESION: CPT | Mod: 59

## 2023-01-11 PROCEDURE — 99212 OFFICE O/P EST SF 10 MIN: CPT | Mod: 25

## 2023-01-11 ASSESSMENT — LOCATION DETAILED DESCRIPTION DERM
LOCATION DETAILED: LEFT MID-UPPER BACK
LOCATION DETAILED: LEFT CENTRAL FRONTAL SCALP
LOCATION DETAILED: HAIR
LOCATION DETAILED: RIGHT SUPERIOR CRUS OF ANTIHELIX
LOCATION DETAILED: RIGHT DORSAL WRIST
LOCATION DETAILED: LEFT INFERIOR MEDIAL FOREHEAD
LOCATION DETAILED: LEFT LATERAL EYEBROW
LOCATION DETAILED: HAIR
LOCATION DETAILED: LEFT INFERIOR FOREHEAD

## 2023-01-11 ASSESSMENT — LOCATION SIMPLE DESCRIPTION DERM
LOCATION SIMPLE: RIGHT WRIST
LOCATION SIMPLE: LEFT EYEBROW
LOCATION SIMPLE: HAIR
LOCATION SIMPLE: LEFT SCALP
LOCATION SIMPLE: RIGHT EAR
LOCATION SIMPLE: LEFT FOREHEAD
LOCATION SIMPLE: LEFT UPPER BACK
LOCATION SIMPLE: HAIR

## 2023-01-11 ASSESSMENT — LOCATION ZONE DERM
LOCATION ZONE: FACE
LOCATION ZONE: EAR
LOCATION ZONE: TRUNK
LOCATION ZONE: ARM
LOCATION ZONE: SCALP
LOCATION ZONE: SCALP

## 2023-01-11 NOTE — PROCEDURE: COUNSELING
Detail Level: Zone
Dapsone Counseling: I discussed with the patient the risks of dapsone including but not limited to hemolytic anemia, agranulocytosis, rashes, methemoglobinemia, kidney failure, peripheral neuropathy, headaches, GI upset, and liver toxicity. Patients who start dapsone require monitoring including baseline LFTs and weekly CBCs for the first month, then every month thereafter. The patient verbalized understanding of the proper use and possible adverse effects of dapsone. All of the patient's questions and concerns were addressed.
Isotretinoin Pregnancy And Lactation Text: This medication is Pregnancy Category X and is considered extremely dangerous during pregnancy. It is unknown if it is excreted in breast milk.
Spironolactone Counseling: Patient advised regarding risks of diarrhea, abdominal pain, hyperkalemia, birth defects (for female patients), liver toxicity and renal toxicity. The patient may need blood work to monitor liver and kidney function and potassium levels while on therapy. The patient verbalized understanding of the proper use and possible adverse effects of spironolactone. All of the patient's questions and concerns were addressed.
Azelaic Acid Pregnancy And Lactation Text: This medication is considered safe during pregnancy and breast feeding.
Topical Clindamycin Counseling: Patient counseled that this medication may cause skin irritation or allergic reactions. In the event of skin irritation, the patient was advised to reduce the amount of the drug applied or use it less frequently. The patient verbalized understanding of the proper use and possible adverse effects of clindamycin. All of the patient's questions and concerns were addressed.
Erythromycin Pregnancy And Lactation Text: This medication is Pregnancy Category B and is considered safe during pregnancy. It is also excreted in breast milk.
Sarecycline Counseling: Patient advised regarding possible photosensitivity and discoloration of the teeth, skin, lips, tongue and gums. Patient instructed to avoid sunlight, if possible. When exposed to sunlight, patients should wear protective clothing, sunglasses, and sunscreen. The patient was instructed to call the office immediately if the following severe adverse effects occur:  hearing changes, easy bruising/bleeding, severe headache, or vision changes. The patient verbalized understanding of the proper use and possible adverse effects of sarecycline. All of the patient's questions and concerns were addressed.
Tazorac Counseling:  Patient advised that medication is irritating and drying. Patient may need to apply sparingly and wash off after an hour before eventually leaving it on overnight. The patient verbalized understanding of the proper use and possible adverse effects of tazorac. All of the patient's questions and concerns were addressed.
Birth Control Pills Counseling: Birth Control Pill Counseling: I discussed with the patient the potential side effects of OCPs including but not limited to increased risk of stroke, heart attack, thrombophlebitis, deep venous thrombosis, hepatic adenomas, breast changes, GI upset, headaches, and depression. The patient verbalized understanding of the proper use and possible adverse effects of OCPs. All of the patient's questions and concerns were addressed.
Aklief Pregnancy And Lactation Text: It is unknown if this medication is safe to use during pregnancy. It is unknown if this medication is excreted in breast milk. Breastfeeding women should use the topical cream on the smallest area of the skin for the shortest time needed while breastfeeding. Do not apply to nipple and areola.
Winlevi Pregnancy And Lactation Text: This medication is considered safe during pregnancy and breastfeeding.
Bactrim Counseling:  I discussed with the patient the risks of sulfa antibiotics including but not limited to GI upset, allergic reaction, drug rash, diarrhea, dizziness, photosensitivity, and yeast infections. Rarely, more serious reactions can occur including but not limited to aplastic anemia, agranulocytosis, methemoglobinemia, blood dyscrasias, liver or kidney failure, lung infiltrates or desquamative/blistering drug rashes.
Minocycline Counseling: Patient advised regarding possible photosensitivity and discoloration of the teeth, skin, lips, tongue and gums. Patient instructed to avoid sunlight, if possible. When exposed to sunlight, patients should wear protective clothing, sunglasses, and sunscreen. The patient was instructed to call the office immediately if the following severe adverse effects occur:  hearing changes, easy bruising/bleeding, severe headache, or vision changes. The patient verbalized understanding of the proper use and possible adverse effects of minocycline. All of the patient's questions and concerns were addressed.
Doxycycline Pregnancy And Lactation Text: This medication is Pregnancy Category D and not consider safe during pregnancy. It is also excreted in breast milk but is considered safe for shorter treatment courses.
Topical Retinoid counseling:  Patient advised to apply a pea-sized amount only at bedtime and wait 30 minutes after washing their face before applying. If too drying, patient may add a non-comedogenic moisturizer. The patient verbalized understanding of the proper use and possible adverse effects of retinoids. All of the patient's questions and concerns were addressed.
Tetracycline Pregnancy And Lactation Text: This medication is Pregnancy Category D and not consider safe during pregnancy. It is also excreted in breast milk.
Topical Sulfur Applications Pregnancy And Lactation Text: This medication is Pregnancy Category C and has an unknown safety profile during pregnancy. It is unknown if this topical medication is excreted in breast milk.
Dapsone Pregnancy And Lactation Text: This medication is Pregnancy Category C and is not considered safe during pregnancy or breast feeding.
Azithromycin Counseling:  I discussed with the patient the risks of azithromycin including but not limited to GI upset, allergic reaction, drug rash, diarrhea, and yeast infections.
High Dose Vitamin A Counseling: Side effects reviewed, pt to contact office should one occur.
Include Pregnancy/Lactation Warning?: No
Benzoyl Peroxide Counseling: Patient counseled that medicine may cause skin irritation and bleach clothing. In the event of skin irritation, the patient was advised to reduce the amount of the drug applied or use it less frequently. The patient verbalized understanding of the proper use and possible adverse effects of benzoyl peroxide. All of the patient's questions and concerns were addressed.
Spironolactone Pregnancy And Lactation Text: This medication can cause feminization of the male fetus and should be avoided during pregnancy. The active metabolite is also found in breast milk.
Topical Clindamycin Pregnancy And Lactation Text: This medication is Pregnancy Category B and is considered safe during pregnancy. It is unknown if it is excreted in breast milk.
Birth Control Pills Pregnancy And Lactation Text: This medication should be avoided if pregnant and for the first 30 days post-partum.
Azelaic Acid Counseling: Patient counseled that medicine may cause skin irritation and to avoid applying near the eyes. In the event of skin irritation, the patient was advised to reduce the amount of the drug applied or use it less frequently. The patient verbalized understanding of the proper use and possible adverse effects of azelaic acid. All of the patient's questions and concerns were addressed.
Tazorac Pregnancy And Lactation Text: This medication is not safe during pregnancy. It is unknown if this medication is excreted in breast milk.
Isotretinoin Counseling: Patient should get monthly blood tests, not donate blood, not drive at night if vision affected, not share medication, and not undergo elective surgery for 6 months after tx completed. Side effects reviewed, pt to contact office should one occur.
Winlevi Counseling:  I discussed with the patient the risks of topical clascoterone including but not limited to erythema, scaling, itching, and stinging. Patient voiced their understanding.
Aklief counseling:  Patient advised to apply a pea-sized amount only at bedtime and wait 30 minutes after washing their face before applying. If too drying, patient may add a non-comedogenic moisturizer. The most commonly reported side effects including irritation, redness, scaling, dryness, stinging, burning, itching, and increased risk of sunburn. The patient verbalized understanding of the proper use and possible adverse effects of retinoids. All of the patient's questions and concerns were addressed.
Topical Retinoid Pregnancy And Lactation Text: This medication is Pregnancy Category C. It is unknown if this medication is excreted in breast milk.
Bactrim Pregnancy And Lactation Text: This medication is Pregnancy Category D and is known to cause fetal risk. It is also excreted in breast milk.
Erythromycin Counseling:  I discussed with the patient the risks of erythromycin including but not limited to GI upset, allergic reaction, drug rash, diarrhea, increase in liver enzymes, and yeast infections.
Doxycycline Counseling:  Patient counseled regarding possible photosensitivity and increased risk for sunburn. Patient instructed to avoid sunlight, if possible. When exposed to sunlight, patients should wear protective clothing, sunglasses, and sunscreen. The patient was instructed to call the office immediately if the following severe adverse effects occur:  hearing changes, easy bruising/bleeding, severe headache, or vision changes. The patient verbalized understanding of the proper use and possible adverse effects of doxycycline. All of the patient's questions and concerns were addressed.
Azithromycin Pregnancy And Lactation Text: This medication is considered safe during pregnancy and is also secreted in breast milk.
Tetracycline Counseling: Patient counseled regarding possible photosensitivity and increased risk for sunburn. Patient instructed to avoid sunlight, if possible. When exposed to sunlight, patients should wear protective clothing, sunglasses, and sunscreen. The patient was instructed to call the office immediately if the following severe adverse effects occur:  hearing changes, easy bruising/bleeding, severe headache, or vision changes. The patient verbalized understanding of the proper use and possible adverse effects of tetracycline. All of the patient's questions and concerns were addressed. Patient understands to avoid pregnancy while on therapy due to potential birth defects.
High Dose Vitamin A Pregnancy And Lactation Text: High dose vitamin A therapy is contraindicated during pregnancy and breast feeding.
Benzoyl Peroxide Pregnancy And Lactation Text: This medication is Pregnancy Category C. It is unknown if benzoyl peroxide is excreted in breast milk.
Topical Sulfur Applications Counseling: Topical Sulfur Counseling: Patient counseled that this medication may cause skin irritation or allergic reactions. In the event of skin irritation, the patient was advised to reduce the amount of the drug applied or use it less frequently. The patient verbalized understanding of the proper use and possible adverse effects of topical sulfur application. All of the patient's questions and concerns were addressed.
Detail Level: Detailed

## 2023-01-11 NOTE — PROCEDURE: ADDITIONAL NOTES
Render Risk Assessment In Note?: no
Additional Notes: discussed if changing in color shape and size return for biopsy
Detail Level: Simple
Additional Notes: if changes return for biopsy

## 2023-01-11 NOTE — PROCEDURE: LIQUID NITROGEN
Detail Level: Detailed
Application Tool (Optional): Liquid Nitrogen Sprayer
Render Note In Bullet Format When Appropriate: No
Consent: The patient's consent was obtained including but not limited to risks of crusting, scabbing, blistering, scarring, darker or lighter pigmentary change, recurrence, incomplete removal and infection.
Show Aperture Variable?: Yes
Number Of Freeze-Thaw Cycles: 1 freeze-thaw cycle
Duration Of Freeze Thaw-Cycle (Seconds): 1
Post-Care Instructions: I reviewed with the patient in detail post-care instructions. Patient is to wear sunprotection, and avoid picking at any of the treated lesions. Pt may apply Vaseline to crusted or scabbing areas.
Spray Paint Text: The liquid nitrogen was applied to the skin utilizing a spray paint frosting technique.
Medical Necessity Clause: This procedure was medically necessary because the lesions that were treated were:
Medical Necessity Information: It is in your best interest to select a reason for this procedure from the list below. All of these items fulfill various CMS LCD requirements except the new and changing color options.

## 2023-01-11 NOTE — PROCEDURE: ACNE SURGERY
Detail Level: Detailed
Render Number Of Lesions Treated: no
Prep Text (Optional): Prior to removal the treatment areas were prepped in the usual fashion.
Acne Type: Comedonal Lesions
Consent was obtained and risks were reviewed including but not limited to scarring, infection, bleeding, scabbing, incomplete removal, and allergy to anesthesia.
Post-Care Instructions: I reviewed with the patient in detail post-care instructions. Patient is to keep the treatment areas dry overnight, and then apply bacitracin twice daily until healed. Patient may apply hydrogen peroxide soaks to remove any crusting.
Extraction Method: 18 gauge needle

## 2023-01-26 ENCOUNTER — NON-APPOINTMENT (OUTPATIENT)
Age: 73
End: 2023-01-26

## 2023-01-26 ENCOUNTER — APPOINTMENT (OUTPATIENT)
Dept: ELECTROPHYSIOLOGY | Facility: CLINIC | Age: 73
End: 2023-01-26
Payer: COMMERCIAL

## 2023-01-26 PROCEDURE — G2066: CPT

## 2023-01-26 PROCEDURE — 93298 REM INTERROG DEV EVAL SCRMS: CPT

## 2023-01-30 ENCOUNTER — APPOINTMENT (OUTPATIENT)
Dept: CARDIOLOGY | Facility: CLINIC | Age: 73
End: 2023-01-30
Payer: COMMERCIAL

## 2023-01-30 ENCOUNTER — NON-APPOINTMENT (OUTPATIENT)
Age: 73
End: 2023-01-30

## 2023-01-30 VITALS
DIASTOLIC BLOOD PRESSURE: 70 MMHG | HEART RATE: 52 BPM | WEIGHT: 165 LBS | OXYGEN SATURATION: 96 % | SYSTOLIC BLOOD PRESSURE: 123 MMHG | HEIGHT: 71 IN | BODY MASS INDEX: 23.1 KG/M2

## 2023-01-30 PROCEDURE — 99214 OFFICE O/P EST MOD 30 MIN: CPT | Mod: 25

## 2023-01-30 PROCEDURE — 93000 ELECTROCARDIOGRAM COMPLETE: CPT

## 2023-01-30 RX ORDER — ATORVASTATIN CALCIUM 40 MG/1
40 TABLET, FILM COATED ORAL
Qty: 90 | Refills: 2 | Status: DISCONTINUED | COMMUNITY
Start: 2022-10-07 | End: 2023-01-30

## 2023-01-30 NOTE — CARDIOLOGY SUMMARY
[de-identified] : 4/28/2017, sinus bradycardia at 51 bpm, diffuse ST-T abnormality (nonspecific) \par 9/20/2022, sinus bradycardia at 55 bpm, normal axis, normal intervals and morphologies, normal R-wave progression.  [de-identified] : 5/23/2017, mild LV remodeling (increased wall thickness with normal LV mass), normal LV function, no pulmonary hypertension, normal LA size, mild mitral regurgitation LVEF 60-65%.\par \par 9/17/2022. TTE.\par Conclusions: \par 1. Normal left ventricular internal dimensions and wall\par thicknesses.\par 2. Normal left ventricular systolic function. No segmental\par wall motion abnormalities.\par 3. Normal right ventricular size and function.\par 4. Agitated saline injection and color flow Doppler\par demonstrates evidence of a patent foramen ovale.\par ------------------------------------------------------------------------\par Confirmed on  9/17/2022 - 12:24:57 by Kenyon Sheikh M.D.\par \par PROCEDURE: Transesophageal (KIRTI) was performed.  Informed\par consent was first obtained for KIRTI. The patient was sedated\par - see anesthesia record.  The procedure was monitored with\par automatic blood pressure monitoring, ECG tracings and pulse\par oximetry. The transesophageal probe was placed in the\par esophagus posterior to the heart without complications.  \par INDICATION: Atrial septal defect (Q21.1), Other\par cerebrovascular disease (I67.89)\par ------------------------------------------------------------------------\par Dimensions:    Normal Values:\par LA:            2.0 - 4.0 cm\par Ao:            2.0 - 3.8 cm\par SEPTUM:        0.6 - 1.2 cm\par PWT:           0.6 - 1.1 cm\par LVIDd:         3.0 - 5.6 cm\par LVIDs:         1.8 - 4.0 cm\par EF (Carey Method): 70 %\par ------------------------------------------------------------------------\par Observations:\par Mitral Valve: Normal mitral valve. Mild mitral\par regurgitation. \par Aortic Valve/Aorta: Normal trileaflet aortic valve. Minimal\par aortic regurgitation.  Peak left ventricular outflow tract\par gradient equals 6 mm Hg, mean gradient is equal to 3 mm Hg,\par LVOT velocity time integral equals 27 cm.\par Simple atheroma noted in aortic arch/descending aorta.\par Left Atrium: Normal left atrium.   No left atrial or left\par atrial appendage thrombus.\par Left Ventricle: Normal left ventricular systolic function.\par No segmental wall motion abnormalities. Normal left\par ventricular internal dimensions and wall thicknesses.\par Right Heart: Normal right atrium. Normal right ventricular\par size and function. Normal tricuspid valve. Normal pulmonic\par valve.\par Pericardium/Pleura: Normal pericardium with no pericardial\par effusion.\par Hemodynamic: Estimated right atrial pressure is 8 mm Hg.\par Agitated saline injection and color flow Doppler\par demonstrates evidence of a patent foramen ovale.  PFO\par liftoff height 0.32 cm.\par ------------------------------------------------------------------------\par Conclusions: \par 1. Normal left ventricular internal dimensions and wall\par thicknesses.\par 2. Normal left ventricular systolic function. No segmental\par wall motion abnormalities.\par 3. Normal right ventricular size and function.\par 4. Agitated saline injection and color flow Doppler\par demonstrates evidence of a patent foramen ovale.  PFO\par liftoff height 0.32 cm.\par ------------------------------------------------------------------------\par Confirmed on  10/11/2022 - 14:27:59 by Kenyon Sheikh M.D.\par FASE\par \par

## 2023-01-30 NOTE — HISTORY OF PRESENT ILLNESS
[FreeTextEntry1] : Patient presents today for follow-up. He is in his usual state of health. His erectile dysfunction had resolved without intervention. More recently, however, he noted an elevated PSA and 4K score. He is now status post robotic prostatectomy. He has urinary continence but needs Viagra for sexual function. He has resumed exercise after prostatectomy. He is exercising at approximately 80% of his prior intensity. He favors bike riding for aerobic exercise.\par \par Patient is up to date with colonoscopy, having had one in 2017. He follows with Dr. Dinero.\par \par June 201 - Patient returns today in his usual state of health. This is his first visit since 2019. He spent June-September in [place], but he spent the rest of the pandemic in [place], [place]. He and his wife drove back and forth. \par He continues to exercise regularly, including riding his bike and doing some resistance training at the gym (more so in [place] than in [place]). He also plays golf.\par He received both doses of Covid-19 vaccine in [place] (January and February).\par \par June 2022 - Patient returns today for follow-up in his usual state of health. He spent November -May in [place], [place].\par He continues to exercise regularly, including riding his bike and doing some resistance training at the gym. He also plays golf.\par He received both doses of Covid-19 vaccine in [place] (January and February 2021) and a Moderna booster sometime after that. He is considering a second booster.\par \par \par August 2022 - Patient returns today for follow-up. He is having worsening difficulty with falling asleep. He  tries to maintain consistent sleep hygiene, including reading before going to bed. He attributes the worsening of the problem to a recent trip to [place] that threw him off. He had been using Benadryl and a medical marijuana gummy, and that was working, until it stopped. Several weeks ago, he started taking Ambien 5 mg PRN, and he has found that very effective.\par He continues to exercise regularly, usually in the morning.\par He does not have any caffeine intake. \par \par 9/20/2022.\par Dennis Cogan returns today for follow up after a recent CVA. He is accompanied by his wife, Patricia.\par On Friday afternoon he was taken to [facility] for complaints of right sided weakness and aphasia which resolved after 10-20 minutes. HIs speech returned to normal first, followed by his left leg and then his left arm and hand.  In the ER a Stroke Code was activated. CT of the head was without acute findings and bedside NIH was 0. Subsequent MRI of the brain revealed a small focus of diffusion restriction and increased T2 signal within the left motor cortex strongly suspicious for an area of acute or subacute infarction.  He was sent to the Clinical Decision Unit before being discharged with aspirin, clopidogrel and high intensity statin therapy. Today he reports feeling well in general, but is a bit shaken about the event. He was seen by Dr. Romero last night who performed ultrasounds of his head and neck which are reported as normal. On Thursday he will be evaluated by Sergey Michele MD for possible PFO closure. \par \par 10/18/2022.\par Dennis Cogan returns today for routine scheduled follow up. He is accompanied by his wife, Patricia.\par Since last visit he has been evaluated by Dr. Sergey Kauffman who performed a KIRTI in anticipation of PFO closure. He has also been evaluated by Dr. Uriel Knight who implanted a loop recorder on 9/29/2022 following a recent cryptogenic stroke. Today he is feeling well. He remains hesitant to resume his prior exercise in light of his recent events. He denies any chest discomfort, shortness of breath, palpitations, lightheadedness or syncope and reports no issues with bruising or bleeding on dual antiplatelet therapy.\par \par PMD: BINH Valencia [phone]\par GI: Brent Dinero MD [phone]\par Urologist: Jere Duncan MD [phone] and Carlin Mckeon MD [phone]\par \par

## 2023-01-30 NOTE — DISCUSSION/SUMMARY
[EKG obtained to assist in diagnosis and management of assessed problem(s)] : EKG obtained to assist in diagnosis and management of assessed problem(s) [FreeTextEntry1] : Very pleasant 72 year-old gentleman with inflammatory bowel disease (ulcerative colitis) presents today for follow-up after a stroke of the left motor cortex (September 2022) followed by ILR placement and PFO closure on 11/17/2022. \par \par We discussed the mechanism of acute stroke in terms of possible causes including, plaque rupture within the vessels of the brain, distal thrombus dislodgement or underlying arrhythmia.\par \par Continue low dose aspirin, clopidogrel and high intensity statin therapy for patient with recent stroke. \par Most recent  mg/dL. Goal LDL now < 70 mg/dL.\par GI complaints may be related to atorvastatin. Will try change to rosuvastatin to see if it improves symptoms. \par \par He will follow up with Dr. Watson Kauffman as scheduled in March 2023. Will confirm it is okay to discontinue clopidogrel three months post PFO closure (2/17/2023). Continue ASA without interruption. \par Follow up with Dr. Uriel Knight for continued monitoring of ILR.  He understands that should silent AF be detected, his management would require anticoagulation with NOAC.\par \par He has been encouraged that it is safe to resume his previous level of exercise.

## 2023-01-31 LAB
25(OH)D3 SERPL-MCNC: 67.4 NG/ML
ALBUMIN SERPL ELPH-MCNC: 4.3 G/DL
ALP BLD-CCNC: 101 U/L
ALT SERPL-CCNC: 15 U/L
ANION GAP SERPL CALC-SCNC: 9 MMOL/L
AST SERPL-CCNC: 19 U/L
BASOPHILS # BLD AUTO: 0.04 K/UL
BASOPHILS NFR BLD AUTO: 0.7 %
BILIRUB SERPL-MCNC: 0.3 MG/DL
BUN SERPL-MCNC: 21 MG/DL
CALCIUM SERPL-MCNC: 9.6 MG/DL
CHLORIDE SERPL-SCNC: 109 MMOL/L
CHOLEST SERPL-MCNC: 137 MG/DL
CO2 SERPL-SCNC: 24 MMOL/L
CREAT SERPL-MCNC: 1.16 MG/DL
EGFR: 67 ML/MIN/1.73M2
EOSINOPHIL # BLD AUTO: 0.16 K/UL
EOSINOPHIL NFR BLD AUTO: 2.9 %
ESTIMATED AVERAGE GLUCOSE: 114 MG/DL
GLUCOSE SERPL-MCNC: 93 MG/DL
HBA1C MFR BLD HPLC: 5.6 %
HCT VFR BLD CALC: 44.3 %
HDLC SERPL-MCNC: 39 MG/DL
HGB BLD-MCNC: 14.8 G/DL
IMM GRANULOCYTES NFR BLD AUTO: 0.2 %
LDLC SERPL CALC-MCNC: 59 MG/DL
LYMPHOCYTES # BLD AUTO: 1.42 K/UL
LYMPHOCYTES NFR BLD AUTO: 26 %
MAN DIFF?: NORMAL
MCHC RBC-ENTMCNC: 33.4 GM/DL
MCHC RBC-ENTMCNC: 33.9 PG
MCV RBC AUTO: 101.4 FL
MONOCYTES # BLD AUTO: 0.52 K/UL
MONOCYTES NFR BLD AUTO: 9.5 %
NEUTROPHILS # BLD AUTO: 3.31 K/UL
NEUTROPHILS NFR BLD AUTO: 60.7 %
NONHDLC SERPL-MCNC: 97 MG/DL
PLATELET # BLD AUTO: 176 K/UL
POTASSIUM SERPL-SCNC: 5 MMOL/L
PROT SERPL-MCNC: 6 G/DL
RBC # BLD: 4.37 M/UL
RBC # FLD: 12.9 %
SODIUM SERPL-SCNC: 142 MMOL/L
TRIGL SERPL-MCNC: 189 MG/DL
TSH SERPL-ACNC: 2.42 UIU/ML
WBC # FLD AUTO: 5.46 K/UL

## 2023-03-02 ENCOUNTER — NON-APPOINTMENT (OUTPATIENT)
Age: 73
End: 2023-03-02

## 2023-03-02 ENCOUNTER — APPOINTMENT (OUTPATIENT)
Dept: ELECTROPHYSIOLOGY | Facility: CLINIC | Age: 73
End: 2023-03-02
Payer: COMMERCIAL

## 2023-03-02 PROCEDURE — G2066: CPT

## 2023-03-02 PROCEDURE — 93298 REM INTERROG DEV EVAL SCRMS: CPT

## 2023-03-23 ENCOUNTER — APPOINTMENT (OUTPATIENT)
Dept: CARDIOLOGY | Facility: CLINIC | Age: 73
End: 2023-03-23
Payer: COMMERCIAL

## 2023-03-23 ENCOUNTER — NON-APPOINTMENT (OUTPATIENT)
Age: 73
End: 2023-03-23

## 2023-03-23 VITALS
HEIGHT: 71 IN | DIASTOLIC BLOOD PRESSURE: 72 MMHG | HEART RATE: 52 BPM | OXYGEN SATURATION: 99 % | BODY MASS INDEX: 22.4 KG/M2 | SYSTOLIC BLOOD PRESSURE: 119 MMHG | WEIGHT: 160 LBS

## 2023-03-23 PROCEDURE — 99213 OFFICE O/P EST LOW 20 MIN: CPT | Mod: 25

## 2023-03-23 PROCEDURE — 93000 ELECTROCARDIOGRAM COMPLETE: CPT

## 2023-03-23 NOTE — HISTORY OF PRESENT ILLNESS
[FreeTextEntry1] : 72 year old man s/p PFO closure 11/2022.  He is doing well with no recurrent events, no chest discomfort, dyspna or palpitations.

## 2023-03-23 NOTE — DISCUSSION/SUMMARY
[FreeTextEntry1] : Patient stable s/p PFo closure.  Follow up echocardiogram ordered. [EKG obtained to assist in diagnosis and management of assessed problem(s)] : EKG obtained to assist in diagnosis and management of assessed problem(s)

## 2023-04-06 ENCOUNTER — NON-APPOINTMENT (OUTPATIENT)
Age: 73
End: 2023-04-06

## 2023-04-06 ENCOUNTER — APPOINTMENT (OUTPATIENT)
Dept: ELECTROPHYSIOLOGY | Facility: CLINIC | Age: 73
End: 2023-04-06
Payer: COMMERCIAL

## 2023-04-06 PROCEDURE — 93298 REM INTERROG DEV EVAL SCRMS: CPT

## 2023-04-06 PROCEDURE — G2066: CPT

## 2023-05-02 ENCOUNTER — APPOINTMENT (RX ONLY)
Dept: URBAN - METROPOLITAN AREA CLINIC 94 | Facility: CLINIC | Age: 73
Setting detail: DERMATOLOGY
End: 2023-05-02

## 2023-05-02 DIAGNOSIS — L85.3 XEROSIS CUTIS: ICD-10-CM

## 2023-05-02 DIAGNOSIS — D22 MELANOCYTIC NEVI: ICD-10-CM

## 2023-05-02 DIAGNOSIS — L81.4 OTHER MELANIN HYPERPIGMENTATION: ICD-10-CM

## 2023-05-02 DIAGNOSIS — L82.1 OTHER SEBORRHEIC KERATOSIS: ICD-10-CM

## 2023-05-02 DIAGNOSIS — L57.0 ACTINIC KERATOSIS: ICD-10-CM | Status: RESOLVED

## 2023-05-02 PROBLEM — D22.62 MELANOCYTIC NEVI OF LEFT UPPER LIMB, INCLUDING SHOULDER: Status: ACTIVE | Noted: 2023-05-02

## 2023-05-02 PROBLEM — D22.72 MELANOCYTIC NEVI OF LEFT LOWER LIMB, INCLUDING HIP: Status: ACTIVE | Noted: 2023-05-02

## 2023-05-02 PROBLEM — D22.71 MELANOCYTIC NEVI OF RIGHT LOWER LIMB, INCLUDING HIP: Status: ACTIVE | Noted: 2023-05-02

## 2023-05-02 PROBLEM — D22.61 MELANOCYTIC NEVI OF RIGHT UPPER LIMB, INCLUDING SHOULDER: Status: ACTIVE | Noted: 2023-05-02

## 2023-05-02 PROBLEM — D22.5 MELANOCYTIC NEVI OF TRUNK: Status: ACTIVE | Noted: 2023-05-02

## 2023-05-02 PROCEDURE — ? LIQUID NITROGEN

## 2023-05-02 PROCEDURE — ? COUNSELING

## 2023-05-02 PROCEDURE — 17003 DESTRUCT PREMALG LES 2-14: CPT

## 2023-05-02 PROCEDURE — 99213 OFFICE O/P EST LOW 20 MIN: CPT | Mod: 25

## 2023-05-02 PROCEDURE — 17000 DESTRUCT PREMALG LESION: CPT

## 2023-05-02 ASSESSMENT — LOCATION DETAILED DESCRIPTION DERM
LOCATION DETAILED: EPIGASTRIC SKIN
LOCATION DETAILED: LEFT INFERIOR MEDIAL MIDBACK
LOCATION DETAILED: RIGHT PROXIMAL POSTERIOR UPPER ARM
LOCATION DETAILED: RIGHT INFERIOR LATERAL FOREHEAD
LOCATION DETAILED: RIGHT INFERIOR MEDIAL MIDBACK
LOCATION DETAILED: RIGHT SUPERIOR LATERAL FOREHEAD
LOCATION DETAILED: LEFT ANTERIOR PROXIMAL UPPER ARM
LOCATION DETAILED: RIGHT DISTAL POSTERIOR UPPER ARM
LOCATION DETAILED: LEFT ANTERIOR PROXIMAL THIGH
LOCATION DETAILED: RIGHT MID-UPPER BACK
LOCATION DETAILED: RIGHT MEDIAL FOREHEAD
LOCATION DETAILED: LEFT POSTERIOR SHOULDER
LOCATION DETAILED: RIGHT DISTAL POSTERIOR THIGH
LOCATION DETAILED: RIGHT PROXIMAL POSTERIOR THIGH
LOCATION DETAILED: RIGHT PROXIMAL CALF
LOCATION DETAILED: INFERIOR THORACIC SPINE
LOCATION DETAILED: RIGHT ANTERIOR PROXIMAL UPPER ARM
LOCATION DETAILED: LEFT CENTRAL POSTAURICULAR SKIN
LOCATION DETAILED: LEFT VENTRAL PROXIMAL FOREARM
LOCATION DETAILED: LEFT GLUTEAL CREASE
LOCATION DETAILED: LEFT SUPERIOR MEDIAL FOREHEAD
LOCATION DETAILED: LEFT SUPERIOR FOREHEAD
LOCATION DETAILED: RIGHT PROXIMAL PRETIBIAL REGION
LOCATION DETAILED: LEFT PROXIMAL POSTERIOR UPPER ARM
LOCATION DETAILED: RIGHT ANTERIOR PROXIMAL THIGH
LOCATION DETAILED: LEFT PROXIMAL CALF
LOCATION DETAILED: LEFT FOREHEAD
LOCATION DETAILED: RIGHT ANTERIOR DISTAL UPPER ARM
LOCATION DETAILED: LEFT DISTAL POSTERIOR THIGH
LOCATION DETAILED: LEFT LATERAL FOREHEAD
LOCATION DETAILED: RIGHT LATERAL SUPERIOR CHEST

## 2023-05-02 ASSESSMENT — LOCATION SIMPLE DESCRIPTION DERM
LOCATION SIMPLE: LEFT FOREARM
LOCATION SIMPLE: LEFT POSTERIOR THIGH
LOCATION SIMPLE: LEFT UPPER ARM
LOCATION SIMPLE: CHEST
LOCATION SIMPLE: RIGHT POSTERIOR THIGH
LOCATION SIMPLE: SCALP
LOCATION SIMPLE: RIGHT FOREHEAD
LOCATION SIMPLE: RIGHT FOREHEAD
LOCATION SIMPLE: ABDOMEN
LOCATION SIMPLE: UPPER BACK
LOCATION SIMPLE: LEFT FOREHEAD
LOCATION SIMPLE: LEFT SHOULDER
LOCATION SIMPLE: LEFT GLUTEAL CREASE
LOCATION SIMPLE: RIGHT PRETIBIAL REGION
LOCATION SIMPLE: RIGHT UPPER BACK
LOCATION SIMPLE: RIGHT LOWER BACK
LOCATION SIMPLE: RIGHT UPPER ARM
LOCATION SIMPLE: LEFT FOREHEAD
LOCATION SIMPLE: RIGHT CALF
LOCATION SIMPLE: LEFT LOWER BACK
LOCATION SIMPLE: RIGHT THIGH
LOCATION SIMPLE: LEFT THIGH
LOCATION SIMPLE: LEFT CALF

## 2023-05-02 ASSESSMENT — LOCATION ZONE DERM
LOCATION ZONE: LEG
LOCATION ZONE: SCALP
LOCATION ZONE: FACE
LOCATION ZONE: ARM
LOCATION ZONE: FACE
LOCATION ZONE: TRUNK

## 2023-05-02 NOTE — PROCEDURE: LIQUID NITROGEN
Render Post-Care Instructions In Note?: no
Number Of Freeze-Thaw Cycles: 1 freeze-thaw cycle
Duration Of Freeze Thaw-Cycle (Seconds): 1
Post-Care Instructions: I reviewed with the patient in detail post-care instructions. Patient is to wear sunprotection, and avoid picking at any of the treated lesions. Pt may apply Vaseline to crusted or scabbing areas.
Show Aperture Variable?: Yes
Consent: The patient's consent was obtained including but not limited to risks of crusting, scabbing, blistering, scarring, darker or lighter pigmentary change, recurrence, incomplete removal and infection.
Detail Level: Detailed
Application Tool (Optional): Liquid Nitrogen Sprayer

## 2023-05-11 ENCOUNTER — APPOINTMENT (OUTPATIENT)
Dept: ELECTROPHYSIOLOGY | Facility: CLINIC | Age: 73
End: 2023-05-11
Payer: COMMERCIAL

## 2023-05-11 ENCOUNTER — APPOINTMENT (OUTPATIENT)
Dept: CARDIOLOGY | Facility: CLINIC | Age: 73
End: 2023-05-11
Payer: COMMERCIAL

## 2023-05-11 ENCOUNTER — NON-APPOINTMENT (OUTPATIENT)
Age: 73
End: 2023-05-11

## 2023-05-11 PROCEDURE — G2066: CPT

## 2023-05-11 PROCEDURE — 93306 TTE W/DOPPLER COMPLETE: CPT

## 2023-05-11 PROCEDURE — 93298 REM INTERROG DEV EVAL SCRMS: CPT

## 2023-05-12 ENCOUNTER — APPOINTMENT (OUTPATIENT)
Dept: CARDIOLOGY | Facility: CLINIC | Age: 73
End: 2023-05-12
Payer: COMMERCIAL

## 2023-05-12 ENCOUNTER — NON-APPOINTMENT (OUTPATIENT)
Age: 73
End: 2023-05-12

## 2023-05-12 VITALS
OXYGEN SATURATION: 98 % | WEIGHT: 163 LBS | SYSTOLIC BLOOD PRESSURE: 121 MMHG | BODY MASS INDEX: 22.73 KG/M2 | HEART RATE: 51 BPM | DIASTOLIC BLOOD PRESSURE: 73 MMHG

## 2023-05-12 DIAGNOSIS — G45.9 TRANSIENT CEREBRAL ISCHEMIC ATTACK, UNSPECIFIED: ICD-10-CM

## 2023-05-12 PROCEDURE — 99214 OFFICE O/P EST MOD 30 MIN: CPT | Mod: 25

## 2023-05-12 PROCEDURE — 93000 ELECTROCARDIOGRAM COMPLETE: CPT

## 2023-05-12 RX ORDER — CLOPIDOGREL BISULFATE 75 MG/1
75 TABLET, FILM COATED ORAL
Qty: 90 | Refills: 2 | Status: DISCONTINUED | COMMUNITY
Start: 2022-10-07 | End: 2023-05-12

## 2023-05-12 NOTE — DISCUSSION/SUMMARY
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 \par \par Continue low dose aspirin, clopidogrel and high intensity statin therapy for patient with recent stroke. \par Most recent  mg/dL. Goal LDL now < 70 mg/dL.\par GI complaints may be related to atorvastatin. Will try change to rosuvastatin to see if it improves symptoms. \par \par Continue ASA without interruption. \par Follow up with Dr. Uriel Knight for continued monitoring of ILR.  He understands that should silent AF be detected, his management would require anticoagulation with NOAC.\par \par He has been encouraged that it is safe to resume his previous level of exercise.

## 2023-05-12 NOTE — HISTORY OF PRESENT ILLNESS
[FreeTextEntry1] : Patient presents today for follow-up. He is in his usual state of health. His erectile dysfunction had resolved without intervention. More recently, however, he noted an elevated PSA and 4K score. He is now status post robotic prostatectomy. He has urinary continence but needs Viagra for sexual function. He has resumed exercise after prostatectomy. He is exercising at approximately 80% of his prior intensity. He favors bike riding for aerobic exercise.\par \par Patient is up to date with colonoscopy, having had one in 2017. He follows with Dr. Dinero.\par \par June 201 - Patient returns today in his usual state of health. This is his first visit since 2019. He spent June-September in [place], but he spent the rest of the pandemic in [place], [place]. He and his wife drove back and forth. \par He continues to exercise regularly, including riding his bike and doing some resistance training at the gym (more so in [place] than in [place]). He also plays golf.\par He received both doses of Covid-19 vaccine in [place] (January and February).\par \par June 2022 - Patient returns today for follow-up in his usual state of health. He spent November -May in [place], [place].\par He continues to exercise regularly, including riding his bike and doing some resistance training at the gym. He also plays golf.\par He received both doses of Covid-19 vaccine in [place] (January and February 2021) and a Moderna booster sometime after that. He is considering a second booster.\par \par \par August 2022 - Patient returns today for follow-up. He is having worsening difficulty with falling asleep. He  tries to maintain consistent sleep hygiene, including reading before going to bed. He attributes the worsening of the problem to a recent trip to [place] that threw him off. He had been using Benadryl and a medical marijuana gummy, and that was working, until it stopped. Several weeks ago, he started taking Ambien 5 mg PRN, and he has found that very effective.\par He continues to exercise regularly, usually in the morning.\par He does not have any caffeine intake. \par \par 9/20/2022.\par Dennis Cogan returns today for follow up after a recent CVA. He is accompanied by his wife, Patricia.\par On Friday afternoon he was taken to [facility] for complaints of right sided weakness and aphasia which resolved after 10-20 minutes. HIs speech returned to normal first, followed by his left leg and then his left arm and hand.  In the ER a Stroke Code was activated. CT of the head was without acute findings and bedside NIH was 0. Subsequent MRI of the brain revealed a small focus of diffusion restriction and increased T2 signal within the left motor cortex strongly suspicious for an area of acute or subacute infarction.  He was sent to the Clinical Decision Unit before being discharged with aspirin, clopidogrel and high intensity statin therapy. Today he reports feeling well in general, but is a bit shaken about the event. He was seen by Dr. Romero last night who performed ultrasounds of his head and neck which are reported as normal. On Thursday he will be evaluated by Sergey Michele MD for possible PFO closure. \par \par 10/18/2022.\par Dennis Cogan returns today for routine scheduled follow up. He is accompanied by his wife, Patricia.\par Since last visit he has been evaluated by Dr. Sergey Kauffman who performed a KIRTI in anticipation of PFO closure. He has also been evaluated by Dr. Uriel Knight who implanted a loop recorder on 9/29/2022 following a recent cryptogenic stroke. Today he is feeling well. He remains hesitant to resume his prior exercise in light of his recent events. He denies any chest discomfort, shortness of breath, palpitations, lightheadedness or syncope and reports no issues with bruising or bleeding on dual antiplatelet therapy.\par \par January 2023 - Patient returns today for follow-up in his usual state of health. He remains on dual antiplatelet therapy after his PFO was closed on 11/16/2022 by Watson Kauffman MD.\par He continues to have upper GI symptoms for which he is planning to have EGD and colonoscopy in March 2023.\par \par PMD: BINH Valencia [phone]\par GI: Brent Dinero MD [phone]\par Urologist: Jere Duncan MD [phone] and Carlin Mcekon MD [phone]\par \par

## 2023-05-12 NOTE — CARDIOLOGY SUMMARY
[de-identified] : 4/28/2017, sinus bradycardia at 51 bpm, diffuse ST-T abnormality (nonspecific) \par 9/20/2022, sinus bradycardia at 55 bpm, normal axis, normal intervals and morphologies, normal R-wave progression.  [de-identified] : 5/23/2017, mild LV remodeling (increased wall thickness with normal LV mass), normal LV function, no pulmonary hypertension, normal LA size, mild mitral regurgitation LVEF 60-65%.\par \par 9/17/2022. TTE.\par Conclusions: \par 1. Normal left ventricular internal dimensions and wall\par thicknesses.\par 2. Normal left ventricular systolic function. No segmental\par wall motion abnormalities.\par 3. Normal right ventricular size and function.\par 4. Agitated saline injection and color flow Doppler\par demonstrates evidence of a patent foramen ovale.\par ------------------------------------------------------------------------\par Confirmed on  9/17/2022 - 12:24:57 by Kenyon Sheikh M.D.\par \par PROCEDURE: Transesophageal (KIRTI) was performed.  Informed\par consent was first obtained for KIRTI. The patient was sedated\par - see anesthesia record.  The procedure was monitored with\par automatic blood pressure monitoring, ECG tracings and pulse\par oximetry. The transesophageal probe was placed in the\par esophagus posterior to the heart without complications.  \par INDICATION: Atrial septal defect (Q21.1), Other\par cerebrovascular disease (I67.89)\par ------------------------------------------------------------------------\par Dimensions:    Normal Values:\par LA:            2.0 - 4.0 cm\par Ao:            2.0 - 3.8 cm\par SEPTUM:        0.6 - 1.2 cm\par PWT:           0.6 - 1.1 cm\par LVIDd:         3.0 - 5.6 cm\par LVIDs:         1.8 - 4.0 cm\par EF (Carey Method): 70 %\par ------------------------------------------------------------------------\par Observations:\par Mitral Valve: Normal mitral valve. Mild mitral\par regurgitation. \par Aortic Valve/Aorta: Normal trileaflet aortic valve. Minimal\par aortic regurgitation.  Peak left ventricular outflow tract\par gradient equals 6 mm Hg, mean gradient is equal to 3 mm Hg,\par LVOT velocity time integral equals 27 cm.\par Simple atheroma noted in aortic arch/descending aorta.\par Left Atrium: Normal left atrium.   No left atrial or left\par atrial appendage thrombus.\par Left Ventricle: Normal left ventricular systolic function.\par No segmental wall motion abnormalities. Normal left\par ventricular internal dimensions and wall thicknesses.\par Right Heart: Normal right atrium. Normal right ventricular\par size and function. Normal tricuspid valve. Normal pulmonic\par valve.\par Pericardium/Pleura: Normal pericardium with no pericardial\par effusion.\par Hemodynamic: Estimated right atrial pressure is 8 mm Hg.\par Agitated saline injection and color flow Doppler\par demonstrates evidence of a patent foramen ovale.  PFO\par liftoff height 0.32 cm.\par ------------------------------------------------------------------------\par Conclusions: \par 1. Normal left ventricular internal dimensions and wall\par thicknesses.\par 2. Normal left ventricular systolic function. No segmental\par wall motion abnormalities.\par 3. Normal right ventricular size and function.\par 4. Agitated saline injection and color flow Doppler\par demonstrates evidence of a patent foramen ovale.  PFO\par liftoff height 0.32 cm.\par ------------------------------------------------------------------------\par Confirmed on  10/11/2022 - 14:27:59 by Kenyon Sheikh M.D.\par ARLENE\par \par 5/11/2023 - interatrial closure device on septum, no flow seen, normal LV systolic function, LVEF 65-70%

## 2023-05-12 NOTE — PHYSICAL EXAM
[General Appearance - Well Developed] : well developed [Normal Appearance] : normal appearance [Well Groomed] : well groomed [General Appearance - Well Nourished] : well nourished [No Deformities] : no deformities [General Appearance - In No Acute Distress] : no acute distress [Normal Oral Mucosa] : normal oral mucosa [No Oral Pallor] : no oral pallor [No Oral Cyanosis] : no oral cyanosis [Normal Oropharynx] : normal oropharynx [Normal Jugular Venous A Waves Present] : normal jugular venous A waves present [Normal Jugular Venous V Waves Present] : normal jugular venous V waves present [No Jugular Venous Black A Waves] : no jugular venous black A waves [] : no respiratory distress [Respiration, Rhythm And Depth] : normal respiratory rhythm and effort [Exaggerated Use Of Accessory Muscles For Inspiration] : no accessory muscle use [Auscultation Breath Sounds / Voice Sounds] : lungs were clear to auscultation bilaterally [Bowel Sounds] : normal bowel sounds [Abdomen Soft] : soft [Abdomen Tenderness] : non-tender [Abnormal Walk] : normal gait [Gait - Sufficient For Exercise Testing] : the gait was sufficient for exercise testing [Nail Clubbing] : no clubbing of the fingernails [Cyanosis, Localized] : no localized cyanosis [Skin Color & Pigmentation] : normal skin color and pigmentation [No Venous Stasis] : no venous stasis [No Xanthoma] : no  xanthoma was observed [Oriented To Time, Place, And Person] : oriented to person, place, and time [Impaired Insight] : insight and judgment were intact [Affect] : the affect was normal [Mood] : the mood was normal [No Anxiety] : not feeling anxious [Conjunctiva] : the conjunctiva were normal in both eyes [PERRL] : pupils were equal in size, round, and reactive to light [EOM Intact] : extraocular movements were intact [5th Left ICS - MCL] : palpated at the 5th LICS in the midclavicular line [Normal] : normal [Bradycardia] : bradycardic [No Precordial Heave] : no precordial heave was noted [Rhythm Regular] : regular [Normal S1] : normal S1 [Normal S2] : normal S2 [No Gallop] : no gallop heard [No Murmur] : no murmurs heard [2+] : left 2+ [No Abnormalities] : the abdominal aorta was not enlarged and no bruit was heard [No Pitting Edema] : no pitting edema present [Right Carotid Bruit] : no bruit heard over the right carotid [Left Carotid Bruit] : no bruit heard over the left carotid

## 2023-05-12 NOTE — REASON FOR VISIT
[FreeTextEntry3] : BINH Valencia [phone] [FreeTextEntry1] : May 2023 - Patient returns today for follow-up in his usual state of health.\par Continues to play pickle ball several times per week and he feels well while doing so.\par He reports some restless leg syndrome.\par EGD and colonoscopy scheduled with Dr. Brent Dinero on 6/2/2023.

## 2023-05-30 NOTE — PACU DISCHARGE NOTE - PAIN:
Controlled with current regime Xenograft Text: The defect edges were debeveled with a #15 scalpel blade.  Given the location of the defect, shape of the defect and the proximity to free margins a xenograft was deemed most appropriate.  The graft was then trimmed to fit the size of the defect.  The graft was then placed in the primary defect and oriented appropriately.

## 2023-06-15 ENCOUNTER — APPOINTMENT (OUTPATIENT)
Dept: ELECTROPHYSIOLOGY | Facility: CLINIC | Age: 73
End: 2023-06-15
Payer: COMMERCIAL

## 2023-06-15 ENCOUNTER — NON-APPOINTMENT (OUTPATIENT)
Age: 73
End: 2023-06-15

## 2023-06-15 PROCEDURE — G2066: CPT

## 2023-06-15 PROCEDURE — 93298 REM INTERROG DEV EVAL SCRMS: CPT

## 2023-06-16 ENCOUNTER — RX RENEWAL (OUTPATIENT)
Age: 73
End: 2023-06-16

## 2023-07-05 NOTE — H&P PST ADULT - NSALCOHOLFREQ_GEN_A_CORE_SD
1. Have you been to the ER, urgent care clinic since your last visit? Hospitalized since your last visit? No    2. Have you seen or consulted any other health care providers outside of the [address] [place] [address] since your last visit? Include any pap smears or colon screening.  No
non-tender; bowel sounds normal; no masses,  no organomegaly   :  exam deferred   Delvis Stage:   deferred   Extremities:  extremities normal, atraumatic, no cyanosis or edema   Neuro:  normal without focal findings, mental status, speech normal, alert and oriented x3, NATALI, and reflexes normal and symmetric       Assessment:      Well adolescent exam.      Plan:     1. Encounter for routine child health examination without abnormal findings  2. Body mass index (BMI) pediatric, 5th percentile to less than 85th percentile for age  1. Encounter for dietary counseling and surveillance  4. Exercise counseling  5. Need for vaccination  -     HPV Vaccine 9-valent IM        Preventive Plan/anticipatory guidance: Discussed the following with patient and parent(s)/guardian and educational materials provided:     [] Nutrition/feeding- eat 5 fruits/veg daily, limit fried foods, fast food, junk food and sugary drinks, Drink water or fat free milk (20-24 ounces daily to get recommended calcium)   []  Participate in > 1 hour of physical activity or active play daily   []  Effects of second hand smoke   []  Avoid direct sunlight, sun protective clothing, sunscreen   []  Safety in the car: Seatbelt use, never enter car if  is under the influence of alcohol or drugs, once one earns their license: never using phone/texting while driving   []  Bicycle helmet use   []  Importance of caring/supportive relationships with family and friends   []  Importance of reporting bullying, stalking, abuse, and any threat to one's safety ASAP   []  Importance of appropriate sleep amount and sleep hygiene   []  Importance of responsibility with school work; impact on one's future   []  Conflict resolution should always be non-violent   []  Internet safety and cyberbullying   []  Hearing protection at loud concerts to prevent permanent hearing loss   []  Proper dental care.   If no fluoride in water, need for oral fluoride supplementation   []
2-3 times/wk

## 2023-07-13 ENCOUNTER — APPOINTMENT (OUTPATIENT)
Dept: CARDIOLOGY | Facility: CLINIC | Age: 73
End: 2023-07-13
Payer: COMMERCIAL

## 2023-07-13 ENCOUNTER — NON-APPOINTMENT (OUTPATIENT)
Age: 73
End: 2023-07-13

## 2023-07-13 VITALS
OXYGEN SATURATION: 99 % | DIASTOLIC BLOOD PRESSURE: 74 MMHG | SYSTOLIC BLOOD PRESSURE: 122 MMHG | BODY MASS INDEX: 23.01 KG/M2 | HEART RATE: 50 BPM | WEIGHT: 165 LBS

## 2023-07-13 DIAGNOSIS — R94.31 ABNORMAL ELECTROCARDIOGRAM [ECG] [EKG]: ICD-10-CM

## 2023-07-13 DIAGNOSIS — F41.9 ANXIETY DISORDER, UNSPECIFIED: ICD-10-CM

## 2023-07-13 PROCEDURE — 93000 ELECTROCARDIOGRAM COMPLETE: CPT

## 2023-07-13 PROCEDURE — 99214 OFFICE O/P EST MOD 30 MIN: CPT | Mod: 25

## 2023-07-15 NOTE — REASON FOR VISIT
[FreeTextEntry3] : BINH Valencia [phone] [FreeTextEntry1] : July 2023 - Patient returns today for follow-up. \par He reports that he recently travelled into the city by himself and was nervous being in the city alone again. He had transient palpitations while travelling.

## 2023-07-15 NOTE — CARDIOLOGY SUMMARY
[de-identified] : 4/28/2017, sinus bradycardia at 51 bpm, diffuse ST-T abnormality (nonspecific) \par 9/20/2022, sinus bradycardia at 55 bpm, normal axis, normal intervals and morphologies, normal R-wave progression.  [de-identified] : 5/23/2017, mild LV remodeling (increased wall thickness with normal LV mass), normal LV function, no pulmonary hypertension, normal LA size, mild mitral regurgitation LVEF 60-65%.\par \par 9/17/2022. TTE.\par Conclusions: \par 1. Normal left ventricular internal dimensions and wall\par thicknesses.\par 2. Normal left ventricular systolic function. No segmental\par wall motion abnormalities.\par 3. Normal right ventricular size and function.\par 4. Agitated saline injection and color flow Doppler\par demonstrates evidence of a patent foramen ovale.\par ------------------------------------------------------------------------\par Confirmed on  9/17/2022 - 12:24:57 by Kenyon Sheikh M.D.\par \par PROCEDURE: Transesophageal (KIRTI) was performed.  Informed\par consent was first obtained for KIRTI. The patient was sedated\par - see anesthesia record.  The procedure was monitored with\par automatic blood pressure monitoring, ECG tracings and pulse\par oximetry. The transesophageal probe was placed in the\par esophagus posterior to the heart without complications.  \par INDICATION: Atrial septal defect (Q21.1), Other\par cerebrovascular disease (I67.89)\par ------------------------------------------------------------------------\par Dimensions:    Normal Values:\par LA:            2.0 - 4.0 cm\par Ao:            2.0 - 3.8 cm\par SEPTUM:        0.6 - 1.2 cm\par PWT:           0.6 - 1.1 cm\par LVIDd:         3.0 - 5.6 cm\par LVIDs:         1.8 - 4.0 cm\par EF (Carey Method): 70 %\par ------------------------------------------------------------------------\par Observations:\par Mitral Valve: Normal mitral valve. Mild mitral\par regurgitation. \par Aortic Valve/Aorta: Normal trileaflet aortic valve. Minimal\par aortic regurgitation.  Peak left ventricular outflow tract\par gradient equals 6 mm Hg, mean gradient is equal to 3 mm Hg,\par LVOT velocity time integral equals 27 cm.\par Simple atheroma noted in aortic arch/descending aorta.\par Left Atrium: Normal left atrium.   No left atrial or left\par atrial appendage thrombus.\par Left Ventricle: Normal left ventricular systolic function.\par No segmental wall motion abnormalities. Normal left\par ventricular internal dimensions and wall thicknesses.\par Right Heart: Normal right atrium. Normal right ventricular\par size and function. Normal tricuspid valve. Normal pulmonic\par valve.\par Pericardium/Pleura: Normal pericardium with no pericardial\par effusion.\par Hemodynamic: Estimated right atrial pressure is 8 mm Hg.\par Agitated saline injection and color flow Doppler\par demonstrates evidence of a patent foramen ovale.  PFO\par liftoff height 0.32 cm.\par ------------------------------------------------------------------------\par Conclusions: \par 1. Normal left ventricular internal dimensions and wall\par thicknesses.\par 2. Normal left ventricular systolic function. No segmental\par wall motion abnormalities.\par 3. Normal right ventricular size and function.\par 4. Agitated saline injection and color flow Doppler\par demonstrates evidence of a patent foramen ovale.  PFO\par liftoff height 0.32 cm.\par ------------------------------------------------------------------------\par Confirmed on  10/11/2022 - 14:27:59 by Kenyon Sheikh M.D.\par ARLENE\par \par 5/11/2023 - interatrial closure device on septum, no flow seen, normal LV systolic function, LVEF 65-70%

## 2023-07-15 NOTE — HISTORY OF PRESENT ILLNESS
[FreeTextEntry1] : Patient presents today for follow-up. He is in his usual state of health. His erectile dysfunction had resolved without intervention. More recently, however, he noted an elevated PSA and 4K score. He is now status post robotic prostatectomy. He has urinary continence but needs Viagra for sexual function. He has resumed exercise after prostatectomy. He is exercising at approximately 80% of his prior intensity. He favors bike riding for aerobic exercise.\par \par Patient is up to date with colonoscopy, having had one in 2017. He follows with Dr. Dinero.\par \par June 201 - Patient returns today in his usual state of health. This is his first visit since 2019. He spent June-September in [place], but he spent the rest of the pandemic in [place], [place]. He and his wife drove back and forth. \par He continues to exercise regularly, including riding his bike and doing some resistance training at the gym (more so in [place] than in [place]). He also plays golf.\par He received both doses of Covid-19 vaccine in [place] (January and February).\par \par June 2022 - Patient returns today for follow-up in his usual state of health. He spent November -May in [place], [place].\par He continues to exercise regularly, including riding his bike and doing some resistance training at the gym. He also plays golf.\par He received both doses of Covid-19 vaccine in [place] (January and February 2021) and a Moderna booster sometime after that. He is considering a second booster.\par \par \par August 2022 - Patient returns today for follow-up. He is having worsening difficulty with falling asleep. He  tries to maintain consistent sleep hygiene, including reading before going to bed. He attributes the worsening of the problem to a recent trip to [place] that threw him off. He had been using Benadryl and a medical marijuana gummy, and that was working, until it stopped. Several weeks ago, he started taking Ambien 5 mg PRN, and he has found that very effective.\par He continues to exercise regularly, usually in the morning.\par He does not have any caffeine intake. \par \par 9/20/2022.\par Dennis Cogan returns today for follow up after a recent CVA. He is accompanied by his wife, Patricia.\par On Friday afternoon he was taken to [facility] for complaints of right sided weakness and aphasia which resolved after 10-20 minutes. HIs speech returned to normal first, followed by his left leg and then his left arm and hand.  In the ER a Stroke Code was activated. CT of the head was without acute findings and bedside NIH was 0. Subsequent MRI of the brain revealed a small focus of diffusion restriction and increased T2 signal within the left motor cortex strongly suspicious for an area of acute or subacute infarction.  He was sent to the Clinical Decision Unit before being discharged with aspirin, clopidogrel and high intensity statin therapy. Today he reports feeling well in general, but is a bit shaken about the event. He was seen by Dr. Romero last night who performed ultrasounds of his head and neck which are reported as normal. On Thursday he will be evaluated by Sergey Michele MD for possible PFO closure. \par \par 10/18/2022.\par Dennis Cogan returns today for routine scheduled follow up. He is accompanied by his wife, Patricia.\par Since last visit he has been evaluated by Dr. Sergey Kauffman who performed a KIRTI in anticipation of PFO closure. He has also been evaluated by Dr. Uriel Knight who implanted a loop recorder on 9/29/2022 following a recent cryptogenic stroke. Today he is feeling well. He remains hesitant to resume his prior exercise in light of his recent events. He denies any chest discomfort, shortness of breath, palpitations, lightheadedness or syncope and reports no issues with bruising or bleeding on dual antiplatelet therapy.\par \par January 2023 - Patient returns today for follow-up in his usual state of health. He remains on dual antiplatelet therapy after his PFO was closed on 11/16/2022 by Watson Kauffman MD.\par He continues to have upper GI symptoms for which he is planning to have EGD and colonoscopy in March 2023.\par \par May 2023 - Patient returns today for follow-up in his usual state of health.\dallin Continues to play [employer] ball several times per week and he feels well while doing so.\par He reports some restless leg syndrome.\par EGD and colonoscopy scheduled with Dr. Brent Dinero on 6/2/2023.\par \par PMD: BINH Valencia [phone]\par GI: Brent Dinero MD [phone]\par Urologist: Jere Duncan MD [phone] and Carlin Mckeon MD [phone]\par \par

## 2023-07-15 NOTE — DISCUSSION/SUMMARY
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 \par \par Continue low dose aspirin and high intensity statin therapy for patient with recent stroke. \par Most recent LDL 59 mg/dL (January 2023). Goal LDL now < 70 mg/dL.\par GI complaints may be related to atorvastatin. Will try change to rosuvastatin to see if it improves symptoms. \par \par Continue ASA without interruption. \par Follow up with Dr. Uriel Knight for continued monitoring of ILR.  He understands that should silent AF be detected, his management would require anticoagulation with NOAC.\par \par He has been encouraged that it is safe to resume his previous level of exercise.

## 2023-07-20 ENCOUNTER — APPOINTMENT (OUTPATIENT)
Dept: ELECTROPHYSIOLOGY | Facility: CLINIC | Age: 73
End: 2023-07-20
Payer: COMMERCIAL

## 2023-07-20 ENCOUNTER — NON-APPOINTMENT (OUTPATIENT)
Age: 73
End: 2023-07-20

## 2023-07-20 PROCEDURE — 93298 REM INTERROG DEV EVAL SCRMS: CPT

## 2023-07-20 PROCEDURE — G2066: CPT

## 2023-08-22 ENCOUNTER — APPOINTMENT (OUTPATIENT)
Dept: ELECTROPHYSIOLOGY | Facility: CLINIC | Age: 73
End: 2023-08-22
Payer: COMMERCIAL

## 2023-08-22 ENCOUNTER — NON-APPOINTMENT (OUTPATIENT)
Age: 73
End: 2023-08-22

## 2023-08-23 PROCEDURE — 93298 REM INTERROG DEV EVAL SCRMS: CPT

## 2023-08-23 PROCEDURE — G2066: CPT

## 2023-08-29 ENCOUNTER — APPOINTMENT (RX ONLY)
Dept: URBAN - METROPOLITAN AREA CLINIC 94 | Facility: CLINIC | Age: 73
Setting detail: DERMATOLOGY
End: 2023-08-29

## 2023-08-29 DIAGNOSIS — Z85.828 PERSONAL HISTORY OF OTHER MALIGNANT NEOPLASM OF SKIN: ICD-10-CM

## 2023-08-29 DIAGNOSIS — L82.1 OTHER SEBORRHEIC KERATOSIS: ICD-10-CM

## 2023-08-29 DIAGNOSIS — L57.0 ACTINIC KERATOSIS: ICD-10-CM

## 2023-08-29 DIAGNOSIS — D49.2 NEOPLASM OF UNSPECIFIED BEHAVIOR OF BONE, SOFT TISSUE, AND SKIN: ICD-10-CM

## 2023-08-29 PROCEDURE — ? BIOPSY BY SHAVE METHOD

## 2023-08-29 PROCEDURE — 17000 DESTRUCT PREMALG LESION: CPT | Mod: 59

## 2023-08-29 PROCEDURE — ? COUNSELING

## 2023-08-29 PROCEDURE — ? LIQUID NITROGEN

## 2023-08-29 PROCEDURE — 99212 OFFICE O/P EST SF 10 MIN: CPT | Mod: 25

## 2023-08-29 PROCEDURE — 11102 TANGNTL BX SKIN SINGLE LES: CPT

## 2023-08-29 PROCEDURE — 17003 DESTRUCT PREMALG LES 2-14: CPT

## 2023-08-29 ASSESSMENT — LOCATION DETAILED DESCRIPTION DERM
LOCATION DETAILED: LEFT SUPERIOR MEDIAL FOREHEAD
LOCATION DETAILED: LEFT SUPERIOR FOREHEAD
LOCATION DETAILED: LEFT MEDIAL FOREHEAD
LOCATION DETAILED: LEFT RIB CAGE
LOCATION DETAILED: LEFT MEDIAL FRONTAL SCALP
LOCATION DETAILED: LEFT CENTRAL FRONTAL SCALP
LOCATION DETAILED: LEFT FOREHEAD

## 2023-08-29 ASSESSMENT — LOCATION ZONE DERM
LOCATION ZONE: FACE
LOCATION ZONE: SCALP
LOCATION ZONE: TRUNK

## 2023-08-29 ASSESSMENT — LOCATION SIMPLE DESCRIPTION DERM
LOCATION SIMPLE: LEFT SCALP
LOCATION SIMPLE: LEFT FOREHEAD
LOCATION SIMPLE: ABDOMEN

## 2023-08-29 NOTE — PROCEDURE: BIOPSY BY SHAVE METHOD
Detail Level: Detailed
Depth Of Biopsy: dermis
Was A Bandage Applied: Yes
Size Of Lesion In Cm: 0.5
X Size Of Lesion In Cm: 0
Anticipated Plan (Based On Presumed Biopsy Results): mohs
Biopsy Type: H and E
Biopsy Method: Dermablade
Anesthesia Type: 1% lidocaine with epinephrine
Hemostasis: Drysol
Wound Care: Petrolatum
Dressing: bandage
Destruction After The Procedure: No
Type Of Destruction Used: Curettage
Curettage Text: The wound bed was treated with curettage after the biopsy was performed.
Cryotherapy Text: The wound bed was treated with cryotherapy after the biopsy was performed.
Electrodesiccation Text: The wound bed was treated with electrodesiccation after the biopsy was performed.
Electrodesiccation And Curettage Text: The wound bed was treated with electrodesiccation and curettage after the biopsy was performed.
Silver Nitrate Text: The wound bed was treated with silver nitrate after the biopsy was performed.
Lab: -[id number]
Consent: Written consent was obtained and risks were reviewed including but not limited to scarring, infection, bleeding, scabbing, incomplete removal, nerve damage and allergy to anesthesia.
Post-Care Instructions: I reviewed with the patient in detail post-care instructions. Patient is to keep the biopsy site dry overnight, and then apply bacitracin twice daily until healed. Patient may apply hydrogen peroxide soaks to remove any crusting.
Notification Instructions: Patient will be notified of biopsy results. However, patient instructed to call the office if not contacted within 2 weeks.
Billing Type: [employer]
Information: Selecting Yes will display possible errors in your note based on the variables you have selected. This validation is only offered as a suggestion for you. PLEASE NOTE THAT THE VALIDATION TEXT WILL BE REMOVED WHEN YOU FINALIZE YOUR NOTE. IF YOU WANT TO FAX A PRELIMINARY NOTE YOU WILL NEED TO TOGGLE THIS TO 'NO' IF YOU DO NOT WANT IT IN YOUR FAXED NOTE.

## 2023-08-29 NOTE — PROCEDURE: LIQUID NITROGEN
Detail Level: Detailed
Render Post-Care Instructions In Note?: no
Show Applicator Variable?: Yes
Consent: The patient's consent was obtained including but not limited to risks of crusting, scabbing, blistering, scarring, darker or lighter pigmentary change, recurrence, incomplete removal and infection.
Number Of Freeze-Thaw Cycles: 1 freeze-thaw cycle
Duration Of Freeze Thaw-Cycle (Seconds): 1
Application Tool (Optional): Liquid Nitrogen Sprayer
Post-Care Instructions: I reviewed with the patient in detail post-care instructions. Patient is to wear sunprotection, and avoid picking at any of the treated lesions. Pt may apply Vaseline to crusted or scabbing areas.

## 2023-08-29 NOTE — HPI: EVALUATION OF SKIN LESION(S)
Hpi Title: Evaluation of Skin Lesions
Additional History: Pt concerned about pigmented lesion on previous mohs site

## 2023-09-20 ENCOUNTER — APPOINTMENT (RX ONLY)
Dept: URBAN - METROPOLITAN AREA CLINIC 94 | Facility: CLINIC | Age: 73
Setting detail: DERMATOLOGY
End: 2023-09-20

## 2023-09-20 DIAGNOSIS — L57.0 ACTINIC KERATOSIS: ICD-10-CM

## 2023-09-20 DIAGNOSIS — L21.8 OTHER SEBORRHEIC DERMATITIS: ICD-10-CM

## 2023-09-20 PROCEDURE — ? COUNSELING

## 2023-09-20 PROCEDURE — 99213 OFFICE O/P EST LOW 20 MIN: CPT | Mod: 25

## 2023-09-20 PROCEDURE — 17000 DESTRUCT PREMALG LESION: CPT

## 2023-09-20 PROCEDURE — ? PRESCRIPTION

## 2023-09-20 PROCEDURE — 17003 DESTRUCT PREMALG LES 2-14: CPT

## 2023-09-20 PROCEDURE — ? PRESCRIPTION MEDICATION MANAGEMENT

## 2023-09-20 PROCEDURE — ? LIQUID NITROGEN

## 2023-09-20 RX ORDER — KETOCONAZOLE 20 MG/G
CREAM TOPICAL BID
Qty: 30 | Refills: 5 | Status: ERX | COMMUNITY
Start: 2023-09-20

## 2023-09-20 RX ADMIN — KETOCONAZOLE: 20 CREAM TOPICAL at 00:00

## 2023-09-20 ASSESSMENT — LOCATION SIMPLE DESCRIPTION DERM
LOCATION SIMPLE: LEFT FOREHEAD
LOCATION SIMPLE: NOSE
LOCATION SIMPLE: RIGHT FOREHEAD
LOCATION SIMPLE: LEFT CHEEK
LOCATION SIMPLE: RIGHT LIP
LOCATION SIMPLE: SUPERIOR FOREHEAD
LOCATION SIMPLE: INFERIOR FOREHEAD
LOCATION SIMPLE: RIGHT SCALP

## 2023-09-20 ASSESSMENT — LOCATION ZONE DERM
LOCATION ZONE: NOSE
LOCATION ZONE: SCALP
LOCATION ZONE: FACE
LOCATION ZONE: LIP

## 2023-09-20 ASSESSMENT — LOCATION DETAILED DESCRIPTION DERM
LOCATION DETAILED: RIGHT UPPER CUTANEOUS LIP
LOCATION DETAILED: RIGHT MEDIAL FRONTAL SCALP
LOCATION DETAILED: RIGHT SUPERIOR FOREHEAD
LOCATION DETAILED: INFERIOR MID FOREHEAD
LOCATION DETAILED: LEFT INFERIOR LATERAL FOREHEAD
LOCATION DETAILED: LEFT INFERIOR MEDIAL MALAR CHEEK
LOCATION DETAILED: NASAL SUPRATIP
LOCATION DETAILED: NASAL DORSUM
LOCATION DETAILED: LEFT FOREHEAD
LOCATION DETAILED: RIGHT FOREHEAD
LOCATION DETAILED: SUPERIOR MID FOREHEAD

## 2023-09-20 NOTE — PROCEDURE: COUNSELING
Patient Specific Counseling (Will Not Stick From Patient To Patient): -------------------------------\\nBIOPSY PROVEN LEFT FRONTAL SCALP ACCESSION ID# [id number]
Detail Level: Detailed

## 2023-09-20 NOTE — PROCEDURE: LIQUID NITROGEN
Duration Of Freeze Thaw-Cycle (Seconds): 1
Show Aperture Variable?: Yes
Number Of Freeze-Thaw Cycles: 1 freeze-thaw cycle
Application Tool (Optional): Liquid Nitrogen Sprayer
Consent: The patient's consent was obtained including but not limited to risks of crusting, scabbing, blistering, scarring, darker or lighter pigmentary change, recurrence, incomplete removal and infection.
Post-Care Instructions: I reviewed with the patient in detail post-care instructions. Patient is to wear sunprotection, and avoid picking at any of the treated lesions. Pt may apply Vaseline to crusted or scabbing areas.
Render Post-Care Instructions In Note?: no
Detail Level: Detailed

## 2023-09-20 NOTE — PROCEDURE: PRESCRIPTION MEDICATION MANAGEMENT
Continue Regimen: ketoconazole 2 % topical cream twice daily to face as needed\\nQuantity: 30.0 g  Days Supply: 30\\nSig: Apply to rash on face 2 x daily
Detail Level: Zone
Render In Strict Bullet Format?: No

## 2023-09-24 ENCOUNTER — NON-APPOINTMENT (OUTPATIENT)
Age: 73
End: 2023-09-24

## 2023-09-25 ENCOUNTER — APPOINTMENT (OUTPATIENT)
Dept: ELECTROPHYSIOLOGY | Facility: CLINIC | Age: 73
End: 2023-09-25
Payer: COMMERCIAL

## 2023-09-25 PROCEDURE — G2066: CPT

## 2023-09-25 PROCEDURE — 93298 REM INTERROG DEV EVAL SCRMS: CPT

## 2023-10-20 ENCOUNTER — APPOINTMENT (OUTPATIENT)
Dept: CARDIOLOGY | Facility: CLINIC | Age: 73
End: 2023-10-20

## 2023-10-27 ENCOUNTER — APPOINTMENT (RX ONLY)
Dept: URBAN - METROPOLITAN AREA CLINIC 94 | Facility: CLINIC | Age: 73
Setting detail: DERMATOLOGY
End: 2023-10-27

## 2023-10-27 DIAGNOSIS — Z85.828 PERSONAL HISTORY OF OTHER MALIGNANT NEOPLASM OF SKIN: ICD-10-CM

## 2023-10-27 DIAGNOSIS — L21.8 OTHER SEBORRHEIC DERMATITIS: ICD-10-CM

## 2023-10-27 DIAGNOSIS — L57.0 ACTINIC KERATOSIS: ICD-10-CM

## 2023-10-27 PROCEDURE — 99213 OFFICE O/P EST LOW 20 MIN: CPT | Mod: 25

## 2023-10-27 PROCEDURE — 17003 DESTRUCT PREMALG LES 2-14: CPT

## 2023-10-27 PROCEDURE — ? ADDITIONAL NOTES

## 2023-10-27 PROCEDURE — ? LIQUID NITROGEN

## 2023-10-27 PROCEDURE — ? COUNSELING

## 2023-10-27 PROCEDURE — 17000 DESTRUCT PREMALG LESION: CPT

## 2023-10-27 PROCEDURE — ? PRESCRIPTION MEDICATION MANAGEMENT

## 2023-10-27 ASSESSMENT — LOCATION ZONE DERM
LOCATION ZONE: NOSE
LOCATION ZONE: LIP
LOCATION ZONE: FACE
LOCATION ZONE: SCALP

## 2023-10-27 ASSESSMENT — LOCATION SIMPLE DESCRIPTION DERM
LOCATION SIMPLE: NOSE
LOCATION SIMPLE: RIGHT FOREHEAD
LOCATION SIMPLE: RIGHT TEMPLE
LOCATION SIMPLE: INFERIOR FOREHEAD
LOCATION SIMPLE: LEFT FOREHEAD
LOCATION SIMPLE: LEFT SCALP
LOCATION SIMPLE: LEFT CHEEK
LOCATION SIMPLE: LEFT NOSE
LOCATION SIMPLE: RIGHT LIP

## 2023-10-27 ASSESSMENT — LOCATION DETAILED DESCRIPTION DERM
LOCATION DETAILED: LEFT CENTRAL FRONTAL SCALP
LOCATION DETAILED: LEFT SUPERIOR FOREHEAD
LOCATION DETAILED: RIGHT CENTRAL TEMPLE
LOCATION DETAILED: INFERIOR MID FOREHEAD
LOCATION DETAILED: RIGHT UPPER CUTANEOUS LIP
LOCATION DETAILED: NASAL ROOT
LOCATION DETAILED: LEFT INFERIOR MEDIAL MALAR CHEEK
LOCATION DETAILED: LEFT SUPERIOR MEDIAL MALAR CHEEK
LOCATION DETAILED: RIGHT SUPERIOR FOREHEAD
LOCATION DETAILED: LEFT NASAL SIDEWALL
LOCATION DETAILED: LEFT MEDIAL FRONTAL SCALP
LOCATION DETAILED: NASAL INFRATIP
LOCATION DETAILED: RIGHT MEDIAL FOREHEAD

## 2023-10-27 NOTE — PROCEDURE: LIQUID NITROGEN
Duration Of Freeze Thaw-Cycle (Seconds): 1
Post-Care Instructions: I reviewed with the patient in detail post-care instructions. Patient is to wear sunprotection, and avoid picking at any of the treated lesions. Pt may apply Vaseline to crusted or scabbing areas.
Number Of Freeze-Thaw Cycles: 1 freeze-thaw cycle
Render Post-Care Instructions In Note?: no
Detail Level: Detailed
Consent: The patient's consent was obtained including but not limited to risks of crusting, scabbing, blistering, scarring, darker or lighter pigmentary change, recurrence, incomplete removal and infection.
Show Aperture Variable?: Yes
Application Tool (Optional): Liquid Nitrogen Sprayer

## 2023-10-27 NOTE — PROCEDURE: ADDITIONAL NOTES
Additional Notes: Discussed PDT treatment.  Patient will think about it for the future
Detail Level: Simple
Render Risk Assessment In Note?: no

## 2023-10-30 ENCOUNTER — NON-APPOINTMENT (OUTPATIENT)
Age: 73
End: 2023-10-30

## 2023-10-30 ENCOUNTER — APPOINTMENT (OUTPATIENT)
Dept: ELECTROPHYSIOLOGY | Facility: CLINIC | Age: 73
End: 2023-10-30
Payer: COMMERCIAL

## 2023-10-31 PROCEDURE — 93298 REM INTERROG DEV EVAL SCRMS: CPT

## 2023-10-31 PROCEDURE — G2066: CPT

## 2023-11-21 ENCOUNTER — APPOINTMENT (OUTPATIENT)
Dept: INTERNAL MEDICINE | Facility: CLINIC | Age: 73
End: 2023-11-21
Payer: COMMERCIAL

## 2023-11-21 VITALS
HEIGHT: 71 IN | TEMPERATURE: 97.2 F | DIASTOLIC BLOOD PRESSURE: 70 MMHG | SYSTOLIC BLOOD PRESSURE: 108 MMHG | WEIGHT: 164 LBS | HEART RATE: 49 BPM | BODY MASS INDEX: 22.96 KG/M2 | OXYGEN SATURATION: 96 %

## 2023-11-21 DIAGNOSIS — K51.90 ULCERATIVE COLITIS, UNSPECIFIED, W/OUT COMPLICATIONS: ICD-10-CM

## 2023-11-21 DIAGNOSIS — Q21.12 PATENT FORAMEN OVALE: ICD-10-CM

## 2023-11-21 DIAGNOSIS — Z83.49 FAMILY HISTORY OF OTHER ENDOCRINE, NUTRITIONAL AND METABOLIC DISEASES: ICD-10-CM

## 2023-11-21 DIAGNOSIS — Z87.19 PERSONAL HISTORY OF OTHER DISEASES OF THE DIGESTIVE SYSTEM: ICD-10-CM

## 2023-11-21 DIAGNOSIS — K64.8 OTHER HEMORRHOIDS: ICD-10-CM

## 2023-11-21 PROCEDURE — 99397 PER PM REEVAL EST PAT 65+ YR: CPT | Mod: 25

## 2023-11-21 PROCEDURE — 36415 COLL VENOUS BLD VENIPUNCTURE: CPT

## 2023-11-21 PROCEDURE — 99387 INIT PM E/M NEW PAT 65+ YRS: CPT | Mod: 25

## 2023-11-21 RX ORDER — ZOLPIDEM TARTRATE 5 MG/1
5 TABLET ORAL
Qty: 30 | Refills: 0 | Status: DISCONTINUED | COMMUNITY
Start: 2021-06-07 | End: 2023-11-21

## 2023-11-21 RX ORDER — MESALAMINE 1.2 G/1
1.2 TABLET, DELAYED RELEASE ORAL DAILY
Qty: 180 | Refills: 1 | Status: ACTIVE | COMMUNITY

## 2023-11-21 RX ORDER — ROSUVASTATIN CALCIUM 20 MG/1
20 TABLET, FILM COATED ORAL
Qty: 90 | Refills: 1 | Status: ACTIVE | COMMUNITY
Start: 2023-01-30 | End: 1900-01-01

## 2023-11-24 PROBLEM — K64.8 INTERNAL HEMORRHOID: Status: RESOLVED | Noted: 2023-11-24 | Resolved: 2023-11-24

## 2023-11-24 LAB
25(OH)D3 SERPL-MCNC: 64.6 NG/ML
ALBUMIN SERPL ELPH-MCNC: 4.8 G/DL
ALP BLD-CCNC: 98 U/L
ALT SERPL-CCNC: 15 U/L
ANION GAP SERPL CALC-SCNC: 16 MMOL/L
AST SERPL-CCNC: 19 U/L
BASOPHILS # BLD AUTO: 0.05 K/UL
BASOPHILS NFR BLD AUTO: 0.8 %
BILIRUB SERPL-MCNC: 0.3 MG/DL
BUN SERPL-MCNC: 19 MG/DL
CALCIUM SERPL-MCNC: 9.5 MG/DL
CHLORIDE SERPL-SCNC: 106 MMOL/L
CHOLEST SERPL-MCNC: 151 MG/DL
CO2 SERPL-SCNC: 23 MMOL/L
CREAT SERPL-MCNC: 1.18 MG/DL
EGFR: 65 ML/MIN/1.73M2
EOSINOPHIL # BLD AUTO: 0.18 K/UL
EOSINOPHIL NFR BLD AUTO: 2.9 %
ESTIMATED AVERAGE GLUCOSE: 111 MG/DL
GLUCOSE SERPL-MCNC: 85 MG/DL
HBA1C MFR BLD HPLC: 5.5 %
HCT VFR BLD CALC: 50.8 %
HCV AB SER QL: NONREACTIVE
HCV S/CO RATIO: 0.08 S/CO
HDLC SERPL-MCNC: 50 MG/DL
HGB BLD-MCNC: 16.5 G/DL
HIV1+2 AB SPEC QL IA.RAPID: NONREACTIVE
IMM GRANULOCYTES NFR BLD AUTO: 0.2 %
LDLC SERPL CALC-MCNC: 87 MG/DL
LYMPHOCYTES # BLD AUTO: 1.75 K/UL
LYMPHOCYTES NFR BLD AUTO: 28.3 %
MAN DIFF?: NORMAL
MCHC RBC-ENTMCNC: 32.5 GM/DL
MCHC RBC-ENTMCNC: 33.7 PG
MCV RBC AUTO: 103.7 FL
MONOCYTES # BLD AUTO: 0.68 K/UL
MONOCYTES NFR BLD AUTO: 11 %
NEUTROPHILS # BLD AUTO: 3.51 K/UL
NEUTROPHILS NFR BLD AUTO: 56.8 %
NONHDLC SERPL-MCNC: 101 MG/DL
PLATELET # BLD AUTO: 184 K/UL
POTASSIUM SERPL-SCNC: 4.8 MMOL/L
PROT SERPL-MCNC: 7 G/DL
PSA FREE FLD-MCNC: 8 %
PSA FREE SERPL-MCNC: 0.02 NG/ML
PSA SERPL-MCNC: 0.23 NG/ML
RBC # BLD: 4.9 M/UL
RBC # FLD: 13 %
SODIUM SERPL-SCNC: 144 MMOL/L
TRIGL SERPL-MCNC: 70 MG/DL
VIT B12 SERPL-MCNC: 1422 PG/ML
WBC # FLD AUTO: 6.18 K/UL

## 2023-12-04 ENCOUNTER — APPOINTMENT (OUTPATIENT)
Dept: ELECTROPHYSIOLOGY | Facility: CLINIC | Age: 73
End: 2023-12-04
Payer: COMMERCIAL

## 2023-12-04 ENCOUNTER — NON-APPOINTMENT (OUTPATIENT)
Age: 73
End: 2023-12-04

## 2023-12-05 PROCEDURE — G2066: CPT

## 2023-12-05 PROCEDURE — 93298 REM INTERROG DEV EVAL SCRMS: CPT | Mod: NC

## 2024-01-08 ENCOUNTER — NON-APPOINTMENT (OUTPATIENT)
Age: 74
End: 2024-01-08

## 2024-01-08 ENCOUNTER — APPOINTMENT (OUTPATIENT)
Dept: ELECTROPHYSIOLOGY | Facility: CLINIC | Age: 74
End: 2024-01-08
Payer: COMMERCIAL

## 2024-01-09 PROCEDURE — 93298 REM INTERROG DEV EVAL SCRMS: CPT

## 2024-01-29 ENCOUNTER — APPOINTMENT (RX ONLY)
Dept: URBAN - METROPOLITAN AREA CLINIC 94 | Facility: CLINIC | Age: 74
Setting detail: DERMATOLOGY
End: 2024-01-29

## 2024-01-29 DIAGNOSIS — Z85.828 PERSONAL HISTORY OF OTHER MALIGNANT NEOPLASM OF SKIN: ICD-10-CM

## 2024-01-29 DIAGNOSIS — L82.1 OTHER SEBORRHEIC KERATOSIS: ICD-10-CM

## 2024-01-29 DIAGNOSIS — L57.0 ACTINIC KERATOSIS: ICD-10-CM

## 2024-01-29 DIAGNOSIS — D49.2 NEOPLASM OF UNSPECIFIED BEHAVIOR OF BONE, SOFT TISSUE, AND SKIN: ICD-10-CM

## 2024-01-29 PROCEDURE — 99212 OFFICE O/P EST SF 10 MIN: CPT | Mod: 25

## 2024-01-29 PROCEDURE — ? BIOPSY BY SHAVE METHOD

## 2024-01-29 PROCEDURE — 11102 TANGNTL BX SKIN SINGLE LES: CPT

## 2024-01-29 PROCEDURE — 17003 DESTRUCT PREMALG LES 2-14: CPT

## 2024-01-29 PROCEDURE — ? LIQUID NITROGEN

## 2024-01-29 PROCEDURE — 11103 TANGNTL BX SKIN EA SEP/ADDL: CPT

## 2024-01-29 PROCEDURE — ? COUNSELING

## 2024-01-29 PROCEDURE — 17000 DESTRUCT PREMALG LESION: CPT | Mod: 59

## 2024-01-29 ASSESSMENT — LOCATION ZONE DERM
LOCATION ZONE: FACE
LOCATION ZONE: SCALP
LOCATION ZONE: TRUNK
LOCATION ZONE: EYELID
LOCATION ZONE: NOSE
LOCATION ZONE: ARM

## 2024-01-29 ASSESSMENT — LOCATION SIMPLE DESCRIPTION DERM
LOCATION SIMPLE: RIGHT EYELID
LOCATION SIMPLE: LEFT SCALP
LOCATION SIMPLE: LEFT FOREHEAD
LOCATION SIMPLE: CHEST
LOCATION SIMPLE: RIGHT FOREARM
LOCATION SIMPLE: LEFT FOREARM
LOCATION SIMPLE: NOSE
LOCATION SIMPLE: LEFT UPPER ARM

## 2024-01-29 ASSESSMENT — LOCATION DETAILED DESCRIPTION DERM
LOCATION DETAILED: LEFT MEDIAL FOREHEAD
LOCATION DETAILED: LEFT NASAL DORSUM
LOCATION DETAILED: LEFT DISTAL LATERAL VENTRAL FOREARM
LOCATION DETAILED: LEFT LATERAL DISTAL UPPER ARM
LOCATION DETAILED: LEFT MEDIAL FRONTAL SCALP
LOCATION DETAILED: RIGHT MEDIAL INFERIOR CHEST
LOCATION DETAILED: RIGHT PROXIMAL DORSAL FOREARM
LOCATION DETAILED: RIGHT LATERAL CANTHUS

## 2024-01-29 NOTE — PROCEDURE: LIQUID NITROGEN
Post-Care Instructions: Patient provided with verbal post-care instructions. Patient is to wear sun protection. Patient may apply Vaseline to crusted or scabbing areas as they heal.
Duration Of Freeze Thaw-Cycle (Seconds): 0
Render Note In Bullet Format When Appropriate: No
Show Applicator Variable?: Yes
Detail Level: Simple
Consent: The patient's consent was obtained and risks, benefits, and alternatives were reviewed.

## 2024-01-29 NOTE — PROCEDURE: BIOPSY BY SHAVE METHOD
Detail Level: Detailed
Depth Of Biopsy: dermis
Was A Bandage Applied: Yes
Size Of Lesion In Cm: 0.2
X Size Of Lesion In Cm: 0
Biopsy Type: H and E
Biopsy Method: double edge Personna blade
Anesthesia Type: 1% lidocaine without epinephrine
Anesthesia Volume In Cc: 0.5
Hemostasis: Electrocautery and Aluminum Chloride
Wound Care: Petrolatum
Dressing: bandage
Destruction After The Procedure: No
Type Of Destruction Used: Curettage
Curettage Text: The wound bed was treated with curettage after the biopsy was performed.
Cryotherapy Text: The wound bed was treated with cryotherapy after the biopsy was performed.
Electrodesiccation Text: The wound bed was treated with electrodesiccation after the biopsy was performed.
Electrodesiccation And Curettage Text: The wound bed was treated with electrodesiccation and curettage after the biopsy was performed.
Silver Nitrate Text: The wound bed was treated with silver nitrate after the biopsy was performed.
Lab: [id number]
Path Notes (To The Dermatopathologist): Small specimen
Consent was obtained and risks, benefits, and alternatives were reviewed.
Post-Care Instructions: Patient is to keep the biopsy covered with Vaseline/Aquaphor and cover with bandage daily until healed. Call the office if any concerns about healing process.
Notification Instructions: Patient will be notified of biopsy results.
Billing Type: Third-Party Bill
Information: Selecting Yes will display possible errors in your note based on the variables you have selected. This validation is only offered as a suggestion for you. PLEASE NOTE THAT THE VALIDATION TEXT WILL BE REMOVED WHEN YOU FINALIZE YOUR NOTE. IF YOU WANT TO FAX A PRELIMINARY NOTE YOU WILL NEED TO TOGGLE THIS TO 'NO' IF YOU DO NOT WANT IT IN YOUR FAXED NOTE.
Size Of Lesion In Cm: 0.3

## 2024-02-12 ENCOUNTER — APPOINTMENT (OUTPATIENT)
Dept: ELECTROPHYSIOLOGY | Facility: CLINIC | Age: 74
End: 2024-02-12
Payer: COMMERCIAL

## 2024-02-12 ENCOUNTER — NON-APPOINTMENT (OUTPATIENT)
Age: 74
End: 2024-02-12

## 2024-02-13 ENCOUNTER — APPOINTMENT (RX ONLY)
Dept: URBAN - METROPOLITAN AREA CLINIC 94 | Facility: CLINIC | Age: 74
Setting detail: DERMATOLOGY
End: 2024-02-13

## 2024-02-13 DIAGNOSIS — Z01.818 ENCOUNTER FOR OTHER PREPROCEDURAL EXAMINATION: ICD-10-CM

## 2024-02-13 PROCEDURE — 93298 REM INTERROG DEV EVAL SCRMS: CPT

## 2024-02-13 PROCEDURE — ? ADDITIONAL NOTES

## 2024-02-13 NOTE — PROCEDURE: ADDITIONAL NOTES
Render Risk Assessment In Note?: no
Additional Notes: Performing Provider: DR. HOOPER\\nRepairing Provider (if applicable):\\nProcedure: EXC\\nSurgical Location: LT LAT DIST UPPER ARM \\nDiagnosis (per pathology report): LENTIGO MALIGNA\\nSize of lesion (size provided on pathology report):\\n\\nSee a physician for any heart conditions (If yes, who and for what): N\\nArtificial Heart Valves:N\\nMitral valve prolapse:N\\nHeart Murmur:N\\nPacemaker:N\\nDefibrillator:N\\nArtificial joints (if yes, indicate location and year):N\\nDoes the patient pre-op medicate with antibiotics (if yes, what medication):N\\n**Pharmacy: N\\nHistory of renal disease or on dialysis:N\\nHistory of liver disease:N\\nHistory of bleeding disorder:N\\nLow platelet count (if the patient can provider):N\\nWill patient discontinue blood thinners, including NSAIDS (Advil, Motrin, ibuprofen & fish oil) (discontinue only by provider order):N\\nImmunosuppressed:N\\nPatient verbalizes understanding of pre-operative instructions:Y\\n\\nNotes (if applicable):
Detail Level: Simple

## 2024-02-13 NOTE — PROCEDURE: ADDITIONAL NOTES
Detail Level: Simple
Additional Notes: Performing Provider: DR. PETERSON\\nRepairing Provider (if applicable):\\nProcedure: MOHS\\nSurgical Location: LT NASAL DORSUM\\nDiagnosis (per pathology report): BCC\\nSize of lesion (size provided on pathology report):\\n\\nSee a physician for any heart conditions (If yes, who and for what): N\\nArtificial Heart Valves:N\\nMitral valve prolapse:N\\nHeart Murmur:N\\nPacemaker:N\\nDefibrillator:N\\nArtificial joints (if yes, indicate location and year):N\\nDoes the patient pre-op medicate with antibiotics (if yes, what medication):N\\n**Pharmacy: N\\nHistory of renal disease or on dialysis:N\\nHistory of liver disease:N\\nHistory of bleeding disorder:N\\nLow platelet count (if the patient can provider):N\\nWill patient discontinue blood thinners, including NSAIDS (Advil, Motrin, ibuprofen & fish oil) (discontinue only by provider order):N\\nImmunosuppressed:N\\nPatient verbalizes understanding of pre-operative instructions:Y\\n\\nNotes (if applicable):
Render Risk Assessment In Note?: no

## 2024-02-19 ENCOUNTER — APPOINTMENT (RX ONLY)
Dept: URBAN - METROPOLITAN AREA CLINIC 94 | Facility: CLINIC | Age: 74
Setting detail: DERMATOLOGY
End: 2024-02-19

## 2024-02-19 PROBLEM — C43.62 MALIGNANT MELANOMA OF LEFT UPPER LIMB, INCLUDING SHOULDER: Status: ACTIVE | Noted: 2024-02-19

## 2024-02-19 PROCEDURE — 11603 EXC TR-EXT MAL+MARG 2.1-3 CM: CPT

## 2024-02-19 PROCEDURE — ? EXCISION

## 2024-02-19 PROCEDURE — 13121 CMPLX RPR S/A/L 2.6-7.5 CM: CPT

## 2024-02-19 NOTE — PROCEDURE: EXCISION
Body Location Override (Optional - Billing Will Still Be Based On Selected Body Map Location If Applicable): left lateral distal upper arm
Surgeon (Optional): Aysha Rodriguez
Biopsy Photograph Reviewed: Yes
Previous Accession (Optional): [id number]
Size Of Lesion In Cm: 0.9
X Size Of Lesion In Cm (Optional): 0
Size Of Margin In Cm: 1
Anesthesia Volume In Cc: 3
Was An Eye Clamp Used?: No
Eye Clamp Note Details: An eye clamp was used during the procedure.
Excision Method: Fusiform
Saucerization Depth: dermis and superficial adipose tissue
Repair Type: Complex
Intermediate / Complex Repair - Final Wound Length In Cm: 6.6
Suturegard Retention Suture: 2-0 Nylon
Retention Suture Bite Size: 3 mm
Length To Time In Minutes Device Was In Place: 10
Width Of Defect Perpendicular To Closure In Cm (Required): 2.9
Undermining Type: Entire Wound
Debridement Text: The wound edges were debrided prior to proceeding with the closure to facilitate wound healing.
Helical Rim Text: The closure involved the helical rim.
Vermilion Border Text: The closure involved the vermilion border.
Nostril Rim Text: The closure involved the nostril rim.
Retention Suture Text: Retention sutures were placed to support the closure and prevent dehiscence.
Suture Removal: 14 days
Lab: [id number]
Lab Facility: [id number]
Graft Donor Site Bandage (Optional-Leave Blank If You Don't Want In Note): Steri-strips and a pressure bandage were applied to the donor site.
Epidermal Closure Graft Donor Site (Optional): simple interrupted
Billing Type: Third-Party Bill
Excision Depth: fascia
Scalpel Size: 15 blade
Anesthesia Type: 1% lidocaine with epinephrine
Additional Anesthesia Volume In Cc: 6
Hemostasis: Electrocautery
Estimated Blood Loss (Cc): minimal
Detail Level: Detailed
Deep Sutures: 4-0 Monocryl
Epidermal Sutures: 4-0 Ethilon
Epidermal Closure: running
Wound Care: Mupirocin
Dressing: dry sterile dressing
Suturegard Intro: Intraoperative tissue expansion was performed, utilizing the SUTUREGARD device, in order to reduce wound tension.
Suturegard Body: The suture ends were repeatedly re-tightened and re-clamped to achieve the desired tissue expansion.
Hemigard Intro: Due to skin fragility and wound tension, it was decided to use HEMIGARD adhesive retention suture devices to permit a linear closure. The skin was cleaned and dried for a 6cm distance away from the wound. Excessive hair, if present, was removed to allow for adhesion.
Hemigard Postcare Instructions: The HEMIGARD strips are to remain completely dry for at least 5-7 days.
Positioning (Leave Blank If You Do Not Want): The patient was placed in a comfortable position exposing the surgical site.
Pre-Excision Curettage Text (Leave Blank If You Do Not Want): Prior to drawing the surgical margin the visible lesion was removed with electrodesiccation and curettage to clearly define the lesion size.
Complex Repair Preamble Text (Leave Blank If You Do Not Want): Extensive wide undermining was performed.
Intermediate Repair Preamble Text (Leave Blank If You Do Not Want): Undermining was performed with blunt dissection.
Curvilinear Excision Additional Text (Leave Blank If You Do Not Want): The margin was drawn around the clinically apparent lesion.  A curvilinear shape was then drawn on the skin incorporating the lesion and margins.  Incisions were then made along these lines to the appropriate tissue plane and the lesion was extirpated.
Fusiform Excision Additional Text (Leave Blank If You Do Not Want): The margin was drawn around the clinically apparent lesion.  A fusiform shape was then drawn on the skin incorporating the lesion and margins.  Incisions were then made along these lines to the appropriate tissue plane and the lesion was extirpated.
Elliptical Excision Additional Text (Leave Blank If You Do Not Want): The margin was drawn around the clinically apparent lesion.  An elliptical shape was then drawn on the skin incorporating the lesion and margins.  Incisions were then made along these lines to the appropriate tissue plane and the lesion was extirpated.
Saucerization Excision Additional Text (Leave Blank If You Do Not Want): The margin was drawn around the clinically apparent lesion.  Incisions were then made along these lines, in a tangential fashion, to the appropriate tissue plane and the lesion was extirpated.
Slit Excision Additional Text (Leave Blank If You Do Not Want): A linear line was drawn on the skin overlying the lesion. An incision was made slowly until the lesion was visualized.  Once visualized, the lesion was removed with blunt dissection.
Excisional Biopsy Additional Text (Leave Blank If You Do Not Want): The margin was drawn around the clinically apparent lesion. An elliptical shape was then drawn on the skin incorporating the lesion and margins.  Incisions were then made along these lines to the appropriate tissue plane and the lesion was extirpated.
Perilesional Excision Additional Text (Leave Blank If You Do Not Want): The margin was drawn around the clinically apparent lesion. Incisions were then made along these lines to the appropriate tissue plane and the lesion was extirpated.
Repair Performed By Another Provider Text (Leave Blank If You Do Not Want): After the tissue was excised the defect was repaired by another provider.
No Repair - Repaired With Adjacent Surgical Defect Text (Leave Blank If You Do Not Want): After the excision the defect was repaired concurrently with another surgical defect which was in close approximation.
Adjacent Tissue Transfer Text: The defect edges were debeveled with a #15 scalpel blade.  Given the location of the defect and the proximity to free margins an adjacent tissue transfer was deemed most appropriate.  Using a sterile surgical marker, an appropriate flap was drawn incorporating the defect and placing the expected incisions within the relaxed skin tension lines where possible.    The area thus outlined was incised deep to adipose tissue with a #15 scalpel blade.  The skin margins were undermined to an appropriate distance in all directions utilizing iris scissors.
Advancement Flap (Single) Text: The defect edges were debeveled with a #15 scalpel blade.  Given the location of the defect and the proximity to free margins a single advancement flap was deemed most appropriate.  Using a sterile surgical marker, an appropriate advancement flap was drawn incorporating the defect and placing the expected incisions within the relaxed skin tension lines where possible.    The area thus outlined was incised deep to adipose tissue with a #15 scalpel blade.  The skin margins were undermined to an appropriate distance in all directions utilizing iris scissors.
Advancement Flap (Double) Text: The defect edges were debeveled with a #15 scalpel blade.  Given the location of the defect and the proximity to free margins a double advancement flap was deemed most appropriate.  Using a sterile surgical marker, the appropriate advancement flaps were drawn incorporating the defect and placing the expected incisions within the relaxed skin tension lines where possible.    The area thus outlined was incised deep to adipose tissue with a #15 scalpel blade.  The skin margins were undermined to an appropriate distance in all directions utilizing iris scissors.
Burow's Advancement Flap Text: The defect edges were debeveled with a #15 scalpel blade.  Given the location of the defect and the proximity to free margins a Burow's advancement flap was deemed most appropriate.  Using a sterile surgical marker, the appropriate advancement flap was drawn incorporating the defect and placing the expected incisions within the relaxed skin tension lines where possible.    The area thus outlined was incised deep to adipose tissue with a #15 scalpel blade.  The skin margins were undermined to an appropriate distance in all directions utilizing iris scissors.
Chonodrocutaneous Helical Advancement Flap Text: The defect edges were debeveled with a #15 scalpel blade.  Given the location of the defect and the proximity to free margins a chondrocutaneous helical advancement flap was deemed most appropriate.  Using a sterile surgical marker, the appropriate advancement flap was drawn incorporating the defect and placing the expected incisions within the relaxed skin tension lines where possible.    The area thus outlined was incised deep to adipose tissue with a #15 scalpel blade.  The skin margins were undermined to an appropriate distance in all directions utilizing iris scissors.
Crescentic Advancement Flap Text: The defect edges were debeveled with a #15 scalpel blade.  Given the location of the defect and the proximity to free margins a crescentic advancement flap was deemed most appropriate.  Using a sterile surgical marker, the appropriate advancement flap was drawn incorporating the defect and placing the expected incisions within the relaxed skin tension lines where possible.    The area thus outlined was incised deep to adipose tissue with a #15 scalpel blade.  The skin margins were undermined to an appropriate distance in all directions utilizing iris scissors.
A-T Advancement Flap Text: The defect edges were debeveled with a #15 scalpel blade.  Given the location of the defect, shape of the defect and the proximity to free margins an A-T advancement flap was deemed most appropriate.  Using a sterile surgical marker, an appropriate advancement flap was drawn incorporating the defect and placing the expected incisions within the relaxed skin tension lines where possible.    The area thus outlined was incised deep to adipose tissue with a #15 scalpel blade.  The skin margins were undermined to an appropriate distance in all directions utilizing iris scissors.
O-T Advancement Flap Text: The defect edges were debeveled with a #15 scalpel blade.  Given the location of the defect, shape of the defect and the proximity to free margins an O-T advancement flap was deemed most appropriate.  Using a sterile surgical marker, an appropriate advancement flap was drawn incorporating the defect and placing the expected incisions within the relaxed skin tension lines where possible.    The area thus outlined was incised deep to adipose tissue with a #15 scalpel blade.  The skin margins were undermined to an appropriate distance in all directions utilizing iris scissors.
O-L Flap Text: The defect edges were debeveled with a #15 scalpel blade.  Given the location of the defect, shape of the defect and the proximity to free margins an O-L flap was deemed most appropriate.  Using a sterile surgical marker, an appropriate advancement flap was drawn incorporating the defect and placing the expected incisions within the relaxed skin tension lines where possible.    The area thus outlined was incised deep to adipose tissue with a #15 scalpel blade.  The skin margins were undermined to an appropriate distance in all directions utilizing iris scissors.
O-Z Flap Text: The defect edges were debeveled with a #15 scalpel blade.  Given the location of the defect, shape of the defect and the proximity to free margins an O-Z flap was deemed most appropriate.  Using a sterile surgical marker, an appropriate transposition flap was drawn incorporating the defect and placing the expected incisions within the relaxed skin tension lines where possible. The area thus outlined was incised deep to adipose tissue with a #15 scalpel blade.  The skin margins were undermined to an appropriate distance in all directions utilizing iris scissors.
Double O-Z Flap Text: The defect edges were debeveled with a #15 scalpel blade.  Given the location of the defect, shape of the defect and the proximity to free margins a Double O-Z flap was deemed most appropriate.  Using a sterile surgical marker, an appropriate transposition flap was drawn incorporating the defect and placing the expected incisions within the relaxed skin tension lines where possible. The area thus outlined was incised deep to adipose tissue with a #15 scalpel blade.  The skin margins were undermined to an appropriate distance in all directions utilizing iris scissors.
V-Y Flap Text: The defect edges were debeveled with a #15 scalpel blade.  Given the location of the defect, shape of the defect and the proximity to free margins a V-Y flap was deemed most appropriate.  Using a sterile surgical marker, an appropriate advancement flap was drawn incorporating the defect and placing the expected incisions within the relaxed skin tension lines where possible.    The area thus outlined was incised deep to adipose tissue with a #15 scalpel blade.  The skin margins were undermined to an appropriate distance in all directions utilizing iris scissors.
Advancement-Rotation Flap Text: The defect edges were debeveled with a #15 scalpel blade.  Given the location of the defect, shape of the defect and the proximity to free margins an advancement-rotation flap was deemed most appropriate.  Using a sterile surgical marker, an appropriate flap was drawn incorporating the defect and placing the expected incisions within the relaxed skin tension lines where possible. The area thus outlined was incised deep to adipose tissue with a #15 scalpel blade.  The skin margins were undermined to an appropriate distance in all directions utilizing iris scissors.
Mercedes Flap Text: The defect edges were debeveled with a #15 scalpel blade.  Given the location of the defect, shape of the defect and the proximity to free margins a Mercedes flap was deemed most appropriate.  Using a sterile surgical marker, an appropriate advancement flap was drawn incorporating the defect and placing the expected incisions within the relaxed skin tension lines where possible. The area thus outlined was incised deep to adipose tissue with a #15 scalpel blade.  The skin margins were undermined to an appropriate distance in all directions utilizing iris scissors.
Modified Advancement Flap Text: The defect edges were debeveled with a #15 scalpel blade.  Given the location of the defect, shape of the defect and the proximity to free margins a modified advancement flap was deemed most appropriate.  Using a sterile surgical marker, an appropriate advancement flap was drawn incorporating the defect and placing the expected incisions within the relaxed skin tension lines where possible.    The area thus outlined was incised deep to adipose tissue with a #15 scalpel blade.  The skin margins were undermined to an appropriate distance in all directions utilizing iris scissors.
Mucosal Advancement Flap Text: Given the location of the defect, shape of the defect and the proximity to free margins a mucosal advancement flap was deemed most appropriate. Incisions were made with a 15 blade scalpel in the appropriate fashion along the cutaneous vermilion border and the mucosal lip. The remaining actinically damaged mucosal tissue was excised.  The mucosal advancement flap was then elevated to the gingival sulcus with care taken to preserve the neurovascular structures and advanced into the primary defect. Care was taken to ensure that precise realignment of the vermilion border was achieved.
Peng Advancement Flap Text: The defect edges were debeveled with a #15 scalpel blade.  Given the location of the defect, shape of the defect and the proximity to free margins a Peng advancement flap was deemed most appropriate.  Using a sterile surgical marker, an appropriate advancement flap was drawn incorporating the defect and placing the expected incisions within the relaxed skin tension lines where possible. The area thus outlined was incised deep to adipose tissue with a #15 scalpel blade.  The skin margins were undermined to an appropriate distance in all directions utilizing iris scissors.
Hatchet Flap Text: The defect edges were debeveled with a #15 scalpel blade.  Given the location of the defect, shape of the defect and the proximity to free margins a hatchet flap was deemed most appropriate.  Using a sterile surgical marker, an appropriate hatchet flap was drawn incorporating the defect and placing the expected incisions within the relaxed skin tension lines where possible.    The area thus outlined was incised deep to adipose tissue with a #15 scalpel blade.  The skin margins were undermined to an appropriate distance in all directions utilizing iris scissors.
Rotation Flap Text: The defect edges were debeveled with a #15 scalpel blade.  Given the location of the defect, shape of the defect and the proximity to free margins a rotation flap was deemed most appropriate.  Using a sterile surgical marker, an appropriate rotation flap was drawn incorporating the defect and placing the expected incisions within the relaxed skin tension lines where possible.    The area thus outlined was incised deep to adipose tissue with a #15 scalpel blade.  The skin margins were undermined to an appropriate distance in all directions utilizing iris scissors.
Bilateral Rotation Flap Text: The defect edges were debeveled with a #15 scalpel blade. Given the location of the defect, shape of the defect and the proximity to free margins a bilateral rotation flap was deemed most appropriate. Using a sterile surgical marker, an appropriate rotation flap was drawn incorporating the defect and placing the expected incisions within the relaxed skin tension lines where possible. The area thus outlined was incised deep to adipose tissue with a #15 scalpel blade. The skin margins were undermined to an appropriate distance in all directions utilizing iris scissors. Following this, the designed flap was carried over into the primary defect and sutured into place.
Spiral Flap Text: The defect edges were debeveled with a #15 scalpel blade.  Given the location of the defect, shape of the defect and the proximity to free margins a spiral flap was deemed most appropriate.  Using a sterile surgical marker, an appropriate rotation flap was drawn incorporating the defect and placing the expected incisions within the relaxed skin tension lines where possible. The area thus outlined was incised deep to adipose tissue with a #15 scalpel blade.  The skin margins were undermined to an appropriate distance in all directions utilizing iris scissors.
Staged Advancement Flap Text: The defect edges were debeveled with a #15 scalpel blade.  Given the location of the defect, shape of the defect and the proximity to free margins a staged advancement flap was deemed most appropriate.  Using a sterile surgical marker, an appropriate advancement flap was drawn incorporating the defect and placing the expected incisions within the relaxed skin tension lines where possible. The area thus outlined was incised deep to adipose tissue with a #15 scalpel blade.  The skin margins were undermined to an appropriate distance in all directions utilizing iris scissors.
Star Wedge Flap Text: The defect edges were debeveled with a #15 scalpel blade.  Given the location of the defect, shape of the defect and the proximity to free margins a star wedge flap was deemed most appropriate.  Using a sterile surgical marker, an appropriate rotation flap was drawn incorporating the defect and placing the expected incisions within the relaxed skin tension lines where possible. The area thus outlined was incised deep to adipose tissue with a #15 scalpel blade.  The skin margins were undermined to an appropriate distance in all directions utilizing iris scissors.
Transposition Flap Text: The defect edges were debeveled with a #15 scalpel blade.  Given the location of the defect and the proximity to free margins a transposition flap was deemed most appropriate.  Using a sterile surgical marker, an appropriate transposition flap was drawn incorporating the defect.    The area thus outlined was incised deep to adipose tissue with a #15 scalpel blade.  The skin margins were undermined to an appropriate distance in all directions utilizing iris scissors.
Muscle Hinge Flap Text: The defect edges were debeveled with a #15 scalpel blade.  Given the size, depth and location of the defect and the proximity to free margins a muscle hinge flap was deemed most appropriate.  Using a sterile surgical marker, an appropriate hinge flap was drawn incorporating the defect. The area thus outlined was incised with a #15 scalpel blade.  The skin margins were undermined to an appropriate distance in all directions utilizing iris scissors.
Mustarde Flap Text: The defect edges were debeveled with a #15 scalpel blade.  Given the size, depth and location of the defect and the proximity to free margins a Mustarde flap was deemed most appropriate.  Using a sterile surgical marker, an appropriate flap was drawn incorporating the defect. The area thus outlined was incised with a #15 scalpel blade.  The skin margins were undermined to an appropriate distance in all directions utilizing iris scissors.
Nasal Turnover Hinge Flap Text: The defect edges were debeveled with a #15 scalpel blade.  Given the size, depth, location of the defect and the defect being full thickness a nasal turnover hinge flap was deemed most appropriate.  Using a sterile surgical marker, an appropriate hinge flap was drawn incorporating the defect. The area thus outlined was incised with a #15 scalpel blade. The flap was designed to recreate the nasal mucosal lining and the alar rim. The skin margins were undermined to an appropriate distance in all directions utilizing iris scissors.
Nasalis-Muscle-Based Myocutaneous Island Pedicle Flap Text: Using a #15 blade, an incision was made around the donor flap to the level of the nasalis muscle. Wide lateral undermining was then performed in both the subcutaneous plane above the nasalis muscle, and in a submuscular plane just above periosteum. This allowed the formation of a free nasalis muscle axial pedicle (based on the angular artery) which was still attached to the actual cutaneous flap, increasing its mobility and vascular viability. Hemostasis was obtained with pinpoint electrocoagulation. The flap was mobilized into position and the pivotal anchor points positioned and stabilized with buried interrupted sutures. Subcutaneous and dermal tissues were closed in a multilayered fashion with sutures. Tissue redundancies were excised, and the epidermal edges were apposed without significant tension and sutured with sutures.
Orbicularis Oris Muscle Flap Text: The defect edges were debeveled with a #15 scalpel blade.  Given that the defect affected the competency of the oral sphincter an orbicularis oris muscle flap was deemed most appropriate to restore this competency and normal muscle function.  Using a sterile surgical marker, an appropriate flap was drawn incorporating the defect. The area thus outlined was incised with a #15 scalpel blade.
Melolabial Transposition Flap Text: The defect edges were debeveled with a #15 scalpel blade.  Given the location of the defect and the proximity to free margins a melolabial flap was deemed most appropriate.  Using a sterile surgical marker, an appropriate melolabial transposition flap was drawn incorporating the defect.    The area thus outlined was incised deep to adipose tissue with a #15 scalpel blade.  The skin margins were undermined to an appropriate distance in all directions utilizing iris scissors.
Rhombic Flap Text: The defect edges were debeveled with a #15 scalpel blade.  Given the location of the defect and the proximity to free margins a rhombic flap was deemed most appropriate.  Using a sterile surgical marker, an appropriate rhombic flap was drawn incorporating the defect.    The area thus outlined was incised deep to adipose tissue with a #15 scalpel blade.  The skin margins were undermined to an appropriate distance in all directions utilizing iris scissors.
Rhomboid Transposition Flap Text: The defect edges were debeveled with a #15 scalpel blade.  Given the location of the defect and the proximity to free margins a rhomboid transposition flap was deemed most appropriate.  Using a sterile surgical marker, an appropriate rhomboid flap was drawn incorporating the defect.    The area thus outlined was incised deep to adipose tissue with a #15 scalpel blade.  The skin margins were undermined to an appropriate distance in all directions utilizing iris scissors.
Bi-Rhombic Flap Text: The defect edges were debeveled with a #15 scalpel blade.  Given the location of the defect and the proximity to free margins a bi-rhombic flap was deemed most appropriate.  Using a sterile surgical marker, an appropriate rhombic flap was drawn incorporating the defect. The area thus outlined was incised deep to adipose tissue with a #15 scalpel blade.  The skin margins were undermined to an appropriate distance in all directions utilizing iris scissors.
Helical Rim Advancement Flap Text: The defect edges were debeveled with a #15 blade scalpel.  Given the location of the defect and the proximity to free margins (helical rim) a double helical rim advancement flap was deemed most appropriate.  Using a sterile surgical marker, the appropriate advancement flaps were drawn incorporating the defect and placing the expected incisions between the helical rim and antihelix where possible.  The area thus outlined was incised through and through with a #15 scalpel blade.  With a skin hook and iris scissors, the flaps were gently and sharply undermined and freed up.
Bilateral Helical Rim Advancement Flap Text: The defect edges were debeveled with a #15 blade scalpel.  Given the location of the defect and the proximity to free margins (helical rim) a bilateral helical rim advancement flap was deemed most appropriate.  Using a sterile surgical marker, the appropriate advancement flaps were drawn incorporating the defect and placing the expected incisions between the helical rim and antihelix where possible.  The area thus outlined was incised through and through with a #15 scalpel blade.  With a skin hook and iris scissors, the flaps were gently and sharply undermined and freed up.
Ear Star Wedge Flap Text: The defect edges were debeveled with a #15 blade scalpel.  Given the location of the defect and the proximity to free margins (helical rim) an ear star wedge flap was deemed most appropriate.  Using a sterile surgical marker, the appropriate flap was drawn incorporating the defect and placing the expected incisions between the helical rim and antihelix where possible.  The area thus outlined was incised through and through with a #15 scalpel blade.
Banner Transposition Flap Text: The defect edges were debeveled with a #15 scalpel blade.  Given the location of the defect and the proximity to free margins a Banner transposition flap was deemed most appropriate.  Using a sterile surgical marker, an appropriate flap drawn around the defect. The area thus outlined was incised deep to adipose tissue with a #15 scalpel blade.  The skin margins were undermined to an appropriate distance in all directions utilizing iris scissors.
Bilobed Flap Text: The defect edges were debeveled with a #15 scalpel blade.  Given the location of the defect and the proximity to free margins a bilobe flap was deemed most appropriate.  Using a sterile surgical marker, an appropriate bilobe flap drawn around the defect.    The area thus outlined was incised deep to adipose tissue with a #15 scalpel blade.  The skin margins were undermined to an appropriate distance in all directions utilizing iris scissors.
Bilobed Transposition Flap Text: The defect edges were debeveled with a #15 scalpel blade.  Given the location of the defect and the proximity to free margins a bilobed transposition flap was deemed most appropriate.  Using a sterile surgical marker, an appropriate bilobe flap drawn around the defect.    The area thus outlined was incised deep to adipose tissue with a #15 scalpel blade.  The skin margins were undermined to an appropriate distance in all directions utilizing iris scissors.
Trilobed Flap Text: The defect edges were debeveled with a #15 scalpel blade.  Given the location of the defect and the proximity to free margins a trilobed flap was deemed most appropriate.  Using a sterile surgical marker, an appropriate trilobed flap drawn around the defect.    The area thus outlined was incised deep to adipose tissue with a #15 scalpel blade.  The skin margins were undermined to an appropriate distance in all directions utilizing iris scissors.
Dorsal Nasal Flap Text: The defect edges were debeveled with a #15 scalpel blade.  Given the location of the defect and the proximity to free margins a dorsal nasal flap was deemed most appropriate.  Using a sterile surgical marker, an appropriate dorsal nasal flap was drawn around the defect.    The area thus outlined was incised deep to adipose tissue with a #15 scalpel blade.  The skin margins were undermined to an appropriate distance in all directions utilizing iris scissors.
Island Pedicle Flap Text: The defect edges were debeveled with a #15 scalpel blade.  Given the location of the defect, shape of the defect and the proximity to free margins an island pedicle advancement flap was deemed most appropriate.  Using a sterile surgical marker, an appropriate advancement flap was drawn incorporating the defect, outlining the appropriate donor tissue and placing the expected incisions within the relaxed skin tension lines where possible.    The area thus outlined was incised deep to adipose tissue with a #15 scalpel blade.  The skin margins were undermined to an appropriate distance in all directions around the primary defect and laterally outward around the island pedicle utilizing iris scissors.  There was minimal undermining beneath the pedicle flap.
Island Pedicle Flap With Canthal Suspension Text: The defect edges were debeveled with a #15 scalpel blade.  Given the location of the defect, shape of the defect and the proximity to free margins an island pedicle advancement flap was deemed most appropriate.  Using a sterile surgical marker, an appropriate advancement flap was drawn incorporating the defect, outlining the appropriate donor tissue and placing the expected incisions within the relaxed skin tension lines where possible. The area thus outlined was incised deep to adipose tissue with a #15 scalpel blade.  The skin margins were undermined to an appropriate distance in all directions around the primary defect and laterally outward around the island pedicle utilizing iris scissors.  There was minimal undermining beneath the pedicle flap. A suspension suture was placed in the canthal tendon to prevent tension and prevent ectropion.
Alar Island Pedicle Flap Text: The defect edges were debeveled with a #15 scalpel blade.  Given the location of the defect, shape of the defect and the proximity to the alar rim an island pedicle advancement flap was deemed most appropriate.  Using a sterile surgical marker, an appropriate advancement flap was drawn incorporating the defect, outlining the appropriate donor tissue and placing the expected incisions within the nasal ala running parallel to the alar rim. The area thus outlined was incised with a #15 scalpel blade.  The skin margins were undermined minimally to an appropriate distance in all directions around the primary defect and laterally outward around the island pedicle utilizing iris scissors.  There was minimal undermining beneath the pedicle flap.
Double Island Pedicle Flap Text: The defect edges were debeveled with a #15 scalpel blade.  Given the location of the defect, shape of the defect and the proximity to free margins a double island pedicle advancement flap was deemed most appropriate.  Using a sterile surgical marker, an appropriate advancement flap was drawn incorporating the defect, outlining the appropriate donor tissue and placing the expected incisions within the relaxed skin tension lines where possible.    The area thus outlined was incised deep to adipose tissue with a #15 scalpel blade.  The skin margins were undermined to an appropriate distance in all directions around the primary defect and laterally outward around the island pedicle utilizing iris scissors.  There was minimal undermining beneath the pedicle flap.
Island Pedicle Flap-Requiring Vessel Identification Text: The defect edges were debeveled with a #15 scalpel blade.  Given the location of the defect, shape of the defect and the proximity to free margins an island pedicle advancement flap was deemed most appropriate.  Using a sterile surgical marker, an appropriate advancement flap was drawn, based on the axial vessel mentioned above, incorporating the defect, outlining the appropriate donor tissue and placing the expected incisions within the relaxed skin tension lines where possible.    The area thus outlined was incised deep to adipose tissue with a #15 scalpel blade.  The skin margins were undermined to an appropriate distance in all directions around the primary defect and laterally outward around the island pedicle utilizing iris scissors.  There was minimal undermining beneath the pedicle flap.
Keystone Flap Text: The defect edges were debeveled with a #15 scalpel blade.  Given the location of the defect, shape of the defect a keystone flap was deemed most appropriate.  Using a sterile surgical marker, an appropriate keystone flap was drawn incorporating the defect, outlining the appropriate donor tissue and placing the expected incisions within the relaxed skin tension lines where possible. The area thus outlined was incised deep to adipose tissue with a #15 scalpel blade.  The skin margins were undermined to an appropriate distance in all directions around the primary defect and laterally outward around the flap utilizing iris scissors.
O-T Plasty Text: The defect edges were debeveled with a #15 scalpel blade.  Given the location of the defect, shape of the defect and the proximity to free margins an O-T plasty was deemed most appropriate.  Using a sterile surgical marker, an appropriate O-T plasty was drawn incorporating the defect and placing the expected incisions within the relaxed skin tension lines where possible.    The area thus outlined was incised deep to adipose tissue with a #15 scalpel blade.  The skin margins were undermined to an appropriate distance in all directions utilizing iris scissors.
O-Z Plasty Text: The defect edges were debeveled with a #15 scalpel blade.  Given the location of the defect, shape of the defect and the proximity to free margins an O-Z plasty (double transposition flap) was deemed most appropriate.  Using a sterile surgical marker, the appropriate transposition flaps were drawn incorporating the defect and placing the expected incisions within the relaxed skin tension lines where possible.    The area thus outlined was incised deep to adipose tissue with a #15 scalpel blade.  The skin margins were undermined to an appropriate distance in all directions utilizing iris scissors.  Hemostasis was achieved with electrocautery.  The flaps were then transposed into place, one clockwise and the other counterclockwise, and anchored with interrupted buried subcutaneous sutures.
Double O-Z Plasty Text: The defect edges were debeveled with a #15 scalpel blade.  Given the location of the defect, shape of the defect and the proximity to free margins a Double O-Z plasty (double transposition flap) was deemed most appropriate.  Using a sterile surgical marker, the appropriate transposition flaps were drawn incorporating the defect and placing the expected incisions within the relaxed skin tension lines where possible. The area thus outlined was incised deep to adipose tissue with a #15 scalpel blade.  The skin margins were undermined to an appropriate distance in all directions utilizing iris scissors.  Hemostasis was achieved with electrocautery.  The flaps were then transposed into place, one clockwise and the other counterclockwise, and anchored with interrupted buried subcutaneous sutures.
V-Y Plasty Text: The defect edges were debeveled with a #15 scalpel blade.  Given the location of the defect, shape of the defect and the proximity to free margins an V-Y advancement flap was deemed most appropriate.  Using a sterile surgical marker, an appropriate advancement flap was drawn incorporating the defect and placing the expected incisions within the relaxed skin tension lines where possible.    The area thus outlined was incised deep to adipose tissue with a #15 scalpel blade.  The skin margins were undermined to an appropriate distance in all directions utilizing iris scissors.
H Plasty Text: Given the location of the defect, shape of the defect and the proximity to free margins a H-plasty was deemed most appropriate for repair.  Using a sterile surgical marker, the appropriate advancement arms of the H-plasty were drawn incorporating the defect and placing the expected incisions within the relaxed skin tension lines where possible. The area thus outlined was incised deep to adipose tissue with a #15 scalpel blade. The skin margins were undermined to an appropriate distance in all directions utilizing iris scissors.  The opposing advancement arms were then advanced into place in opposite direction and anchored with interrupted buried subcutaneous sutures.
W Plasty Text: The lesion was extirpated to the level of the fat with a #15 scalpel blade.  Given the location of the defect, shape of the defect and the proximity to free margins a W-plasty was deemed most appropriate for repair.  Using a sterile surgical marker, the appropriate transposition arms of the W-plasty were drawn incorporating the defect and placing the expected incisions within the relaxed skin tension lines where possible.    The area thus outlined was incised deep to adipose tissue with a #15 scalpel blade.  The skin margins were undermined to an appropriate distance in all directions utilizing iris scissors.  The opposing transposition arms were then transposed into place in opposite direction and anchored with interrupted buried subcutaneous sutures.
Z Plasty Text: The lesion was extirpated to the level of the fat with a #15 scalpel blade.  Given the location of the defect, shape of the defect and the proximity to free margins a Z-plasty was deemed most appropriate for repair.  Using a sterile surgical marker, the appropriate transposition arms of the Z-plasty were drawn incorporating the defect and placing the expected incisions within the relaxed skin tension lines where possible.    The area thus outlined was incised deep to adipose tissue with a #15 scalpel blade.  The skin margins were undermined to an appropriate distance in all directions utilizing iris scissors.  The opposing transposition arms were then transposed into place in opposite direction and anchored with interrupted buried subcutaneous sutures.
Double Z Plasty Text: The lesion was extirpated to the level of the fat with a #15 scalpel blade. Given the location of the defect, shape of the defect and the proximity to free margins a double Z-plasty was deemed most appropriate for repair. Using a sterile surgical marker, the appropriate transposition arms of the double Z-plasty were drawn incorporating the defect and placing the expected incisions within the relaxed skin tension lines where possible. The area thus outlined was incised deep to adipose tissue with a #15 scalpel blade. The skin margins were undermined to an appropriate distance in all directions utilizing iris scissors. The opposing transposition arms were then transposed and carried over into place in opposite direction and anchored with interrupted buried subcutaneous sutures.
Zygomaticofacial Flap Text: Given the location of the defect, shape of the defect and the proximity to free margins a zygomaticofacial flap was deemed most appropriate for repair.  Using a sterile surgical marker, the appropriate flap was drawn incorporating the defect and placing the expected incisions within the relaxed skin tension lines where possible. The area thus outlined was incised deep to adipose tissue with a #15 scalpel blade with preservation of a vascular pedicle.  The skin margins were undermined to an appropriate distance in all directions utilizing iris scissors.  The flap was then placed into the defect and anchored with interrupted buried subcutaneous sutures.
Cheek Interpolation Flap Text: A decision was made to reconstruct the defect utilizing an interpolation axial flap and a staged reconstruction.  A telfa template was made of the defect.  This telfa template was then used to outline the Cheek Interpolation flap.  The donor area for the pedicle flap was then injected with anesthesia.  The flap was excised through the skin and subcutaneous tissue down to the layer of the underlying musculature.  The interpolation flap was carefully excised within this deep plane to maintain its blood supply.  The edges of the donor site were undermined.   The donor site was closed in a primary fashion.  The pedicle was then rotated into position and sutured.  Once the tube was sutured into place, adequate blood supply was confirmed with blanching and refill.  The pedicle was then wrapped with xeroform gauze and dressed appropriately with a telfa and gauze bandage to ensure continued blood supply and protect the attached pedicle.
Cheek-To-Nose Interpolation Flap Text: A decision was made to reconstruct the defect utilizing an interpolation axial flap and a staged reconstruction.  A telfa template was made of the defect.  This telfa template was then used to outline the Cheek-To-Nose Interpolation flap.  The donor area for the pedicle flap was then injected with anesthesia.  The flap was excised through the skin and subcutaneous tissue down to the layer of the underlying musculature.  The interpolation flap was carefully excised within this deep plane to maintain its blood supply.  The edges of the donor site were undermined.   The donor site was closed in a primary fashion.  The pedicle was then rotated into position and sutured.  Once the tube was sutured into place, adequate blood supply was confirmed with blanching and refill.  The pedicle was then wrapped with xeroform gauze and dressed appropriately with a telfa and gauze bandage to ensure continued blood supply and protect the attached pedicle.
Interpolation Flap Text: A decision was made to reconstruct the defect utilizing an interpolation axial flap and a staged reconstruction.  A telfa template was made of the defect.  This telfa template was then used to outline the interpolation flap.  The donor area for the pedicle flap was then injected with anesthesia.  The flap was excised through the skin and subcutaneous tissue down to the layer of the underlying musculature.  The interpolation flap was carefully excised within this deep plane to maintain its blood supply.  The edges of the donor site were undermined.   The donor site was closed in a primary fashion.  The pedicle was then rotated into position and sutured.  Once the tube was sutured into place, adequate blood supply was confirmed with blanching and refill.  The pedicle was then wrapped with xeroform gauze and dressed appropriately with a telfa and gauze bandage to ensure continued blood supply and protect the attached pedicle.
Melolabial Interpolation Flap Text: A decision was made to reconstruct the defect utilizing an interpolation axial flap and a staged reconstruction.  A telfa template was made of the defect.  This telfa template was then used to outline the melolabial interpolation flap.  The donor area for the pedicle flap was then injected with anesthesia.  The flap was excised through the skin and subcutaneous tissue down to the layer of the underlying musculature.  The pedicle flap was carefully excised within this deep plane to maintain its blood supply.  The edges of the donor site were undermined.   The donor site was closed in a primary fashion.  The pedicle was then rotated into position and sutured.  Once the tube was sutured into place, adequate blood supply was confirmed with blanching and refill.  The pedicle was then wrapped with xeroform gauze and dressed appropriately with a telfa and gauze bandage to ensure continued blood supply and protect the attached pedicle.
Mastoid Interpolation Flap Text: A decision was made to reconstruct the defect utilizing an interpolation axial flap and a staged reconstruction.  A telfa template was made of the defect.  This telfa template was then used to outline the mastoid interpolation flap.  The donor area for the pedicle flap was then injected with anesthesia.  The flap was excised through the skin and subcutaneous tissue down to the layer of the underlying musculature.  The pedicle flap was carefully excised within this deep plane to maintain its blood supply.  The edges of the donor site were undermined.   The donor site was closed in a primary fashion.  The pedicle was then rotated into position and sutured.  Once the tube was sutured into place, adequate blood supply was confirmed with blanching and refill.  The pedicle was then wrapped with xeroform gauze and dressed appropriately with a telfa and gauze bandage to ensure continued blood supply and protect the attached pedicle.
Posterior Auricular Interpolation Flap Text: A decision was made to reconstruct the defect utilizing an interpolation axial flap and a staged reconstruction.  A telfa template was made of the defect.  This telfa template was then used to outline the posterior auricular interpolation flap.  The donor area for the pedicle flap was then injected with anesthesia.  The flap was excised through the skin and subcutaneous tissue down to the layer of the underlying musculature.  The pedicle flap was carefully excised within this deep plane to maintain its blood supply.  The edges of the donor site were undermined.   The donor site was closed in a primary fashion.  The pedicle was then rotated into position and sutured.  Once the tube was sutured into place, adequate blood supply was confirmed with blanching and refill.  The pedicle was then wrapped with xeroform gauze and dressed appropriately with a telfa and gauze bandage to ensure continued blood supply and protect the attached pedicle.
Paramedian Forehead Flap Text: A decision was made to reconstruct the defect utilizing an interpolation axial flap and a staged reconstruction.  A telfa template was made of the defect.  This telfa template was then used to outline the paramedian forehead pedicle flap.  The donor area for the pedicle flap was then injected with anesthesia.  The flap was excised through the skin and subcutaneous tissue down to the layer of the underlying musculature.  The pedicle flap was carefully excised within this deep plane to maintain its blood supply.  The edges of the donor site were undermined.   The donor site was closed in a primary fashion.  The pedicle was then rotated into position and sutured.  Once the tube was sutured into place, adequate blood supply was confirmed with blanching and refill.  The pedicle was then wrapped with xeroform gauze and dressed appropriately with a telfa and gauze bandage to ensure continued blood supply and protect the attached pedicle.
Abbe Flap (Upper To Lower Lip) Text: The defect of the lower lip was assessed and measured.  Given the location and size of the defect, an Abbe flap was deemed most appropriate.  Using a sterile surgical marker, an appropriate Abbe flap was measured and drawn on the upper lip. Local anesthesia was then infiltrated.  A scalpel was then used to incise the upper lip through and through the skin, vermilion, muscle and mucosa, leaving the flap pedicled on the opposite side.  The flap was then rotated and transferred to the lower lip defect.  The flap was then sutured into place with a three layer technique, closing the orbicularis oris muscle layer with subcutaneous buried sutures, followed by a mucosal layer and an epidermal layer.
Abbe Flap (Lower To Upper Lip) Text: The defect of the upper lip was assessed and measured.  Given the location and size of the defect, an Abbe flap was deemed most appropriate.  Using a sterile surgical marker, an appropriate Abbe flap was measured and drawn on the lower lip. Local anesthesia was then infiltrated. A scalpel was then used to incise the upper lip through and through the skin, vermilion, muscle and mucosa, leaving the flap pedicled on the opposite side.  The flap was then rotated and transferred to the lower lip defect.  The flap was then sutured into place with a three layer technique, closing the orbicularis oris muscle layer with subcutaneous buried sutures, followed by a mucosal layer and an epidermal layer.
Estlander Flap (Upper To Lower Lip) Text: The defect of the lower lip was assessed and measured.  Given the location and size of the defect, an Estlander flap was deemed most appropriate.  Using a sterile surgical marker, an appropriate Estlander flap was measured and drawn on the upper lip. Local anesthesia was then infiltrated. A scalpel was then used to incise the lateral aspect of the flap, through skin, muscle and mucosa, leaving the flap pedicled medially.  The flap was then rotated and positioned to fill the lower lip defect.  The flap was then sutured into place with a three layer technique, closing the orbicularis oris muscle layer with subcutaneous buried sutures, followed by a mucosal layer and an epidermal layer.
Lip Wedge Excision Repair Text: Given the location of the defect and the proximity to free margins a full thickness wedge repair was deemed most appropriate.  Using a sterile surgical marker, the appropriate repair was drawn incorporating the defect and placing the expected incisions perpendicular to the vermilion border.  The vermilion border was also meticulously outlined to ensure appropriate reapproximation during the repair.  The area thus outlined was incised through and through with a #15 scalpel blade.  The muscularis and dermis were reaproximated with deep sutures following hemostasis. Care was taken to realign the vermilion border before proceeding with the superficial closure.  Once the vermilion was realigned the superfical and mucosal closure was finished.
Ftsg Text: The defect edges were debeveled with a #15 scalpel blade.  Given the location of the defect, shape of the defect and the proximity to free margins a full thickness skin graft was deemed most appropriate.  Using a sterile surgical marker, the primary defect shape was transferred to the donor site. The area thus outlined was incised deep to adipose tissue with a #15 scalpel blade.  The harvested graft was then trimmed of adipose tissue until only dermis and epidermis was left.  The skin margins of the secondary defect were undermined to an appropriate distance in all directions utilizing iris scissors.  The secondary defect was closed with interrupted buried subcutaneous sutures.  The skin edges were then re-apposed with running  sutures.  The skin graft was then placed in the primary defect and oriented appropriately.
Split-Thickness Skin Graft Text: The defect edges were debeveled with a #15 scalpel blade.  Given the location of the defect, shape of the defect and the proximity to free margins a split thickness skin graft was deemed most appropriate.  Using a sterile surgical marker, the primary defect shape was transferred to the donor site. The split thickness graft was then harvested.  The skin graft was then placed in the primary defect and oriented appropriately.
Pinch Graft Text: The defect edges were debeveled with a #15 scalpel blade. Given the location of the defect, shape of the defect and the proximity to free margins a pinch graft was deemed most appropriate. Using a sterile surgical marker, the primary defect shape was transferred to the donor site. The area thus outlined was incised deep to adipose tissue with a #15 scalpel blade.  The harvested graft was then trimmed of adipose tissue until only dermis and epidermis was left. The skin margins of the secondary defect were undermined to an appropriate distance in all directions utilizing iris scissors.  The secondary defect was closed with interrupted buried subcutaneous sutures.  The skin edges were then re-apposed with running  sutures.  The skin graft was then placed in the primary defect and oriented appropriately.
Burow's Graft Text: The defect edges were debeveled with a #15 scalpel blade.  Given the location of the defect, shape of the defect, the proximity to free margins and the presence of a standing cone deformity a Burow's skin graft was deemed most appropriate. The standing cone was removed and this tissue was then trimmed to the shape of the primary defect. The adipose tissue was also removed until only dermis and epidermis were left.  The skin margins of the secondary defect were undermined to an appropriate distance in all directions utilizing iris scissors.  The secondary defect was closed with interrupted buried subcutaneous sutures.  The skin edges were then re-apposed with running  sutures.  The skin graft was then placed in the primary defect and oriented appropriately.
Cartilage Graft Text: The defect edges were debeveled with a #15 scalpel blade.  Given the location of the defect, shape of the defect, the fact the defect involved a full thickness cartilage defect a cartilage graft was deemed most appropriate.  An appropriate donor site was identified, cleansed, and anesthetized. The cartilage graft was then harvested and transferred to the recipient site, oriented appropriately and then sutured into place.  The secondary defect was then repaired using a primary closure.
Composite Graft Text: The defect edges were debeveled with a #15 scalpel blade.  Given the location of the defect, shape of the defect, the proximity to free margins and the fact the defect was full thickness a composite graft was deemed most appropriate.  The defect was outline and then transferred to the donor site.  A full thickness graft was then excised from the donor site. The graft was then placed in the primary defect, oriented appropriately and then sutured into place.  The secondary defect was then repaired using a primary closure.
Epidermal Autograft Text: The defect edges were debeveled with a #15 scalpel blade.  Given the location of the defect, shape of the defect and the proximity to free margins an epidermal autograft was deemed most appropriate.  Using a sterile surgical marker, the primary defect shape was transferred to the donor site. The epidermal graft was then harvested.  The skin graft was then placed in the primary defect and oriented appropriately.
Dermal Autograft Text: The defect edges were debeveled with a #15 scalpel blade.  Given the location of the defect, shape of the defect and the proximity to free margins a dermal autograft was deemed most appropriate.  Using a sterile surgical marker, the primary defect shape was transferred to the donor site. The area thus outlined was incised deep to adipose tissue with a #15 scalpel blade.  The harvested graft was then trimmed of adipose and epidermal tissue until only dermis was left.  The skin graft was then placed in the primary defect and oriented appropriately.
Skin Substitute Text: The defect edges were debeveled with a #15 scalpel blade.  Given the location of the defect, shape of the defect and the proximity to free margins a skin substitute graft was deemed most appropriate.  The graft material was trimmed to fit the size of the defect. The graft was then placed in the primary defect and oriented appropriately.
Tissue Cultured Epidermal Autograft Text: The defect edges were debeveled with a #15 scalpel blade.  Given the location of the defect, shape of the defect and the proximity to free margins a tissue cultured epidermal autograft was deemed most appropriate.  The graft was then trimmed to fit the size of the defect.  The graft was then placed in the primary defect and oriented appropriately.
Xenograft Text: The defect edges were debeveled with a #15 scalpel blade.  Given the location of the defect, shape of the defect and the proximity to free margins a xenograft was deemed most appropriate.  The graft was then trimmed to fit the size of the defect.  The graft was then placed in the primary defect and oriented appropriately.
Purse String (Intermediate) Text: Given the location of the defect and the characteristics of the surrounding skin a purse string intermediate closure was deemed most appropriate.  Undermining was performed circumferentially around the surgical defect.  A purse string suture was then placed and tightened.
Purse String (Simple) Text: Given the location of the defect and the characteristics of the surrounding skin a purse string simple closure was deemed most appropriate.  Undermining was performed circumferentially around the surgical defect.  A purse string suture was then placed and tightened.
Partial Purse String (Intermediate) Text: Given the location of the defect and the characteristics of the surrounding skin an intermediate purse string closure was deemed most appropriate.  Undermining was performed circumferentially around the surgical defect.  A purse string suture was then placed and tightened. Wound tension of the circular defect prevented complete closure of the wound.
Partial Purse String (Simple) Text: Given the location of the defect and the characteristics of the surrounding skin a simple purse string closure was deemed most appropriate.  Undermining was performed circumferentially around the surgical defect.  A purse string suture was then placed and tightened. Wound tension of the circular defect prevented complete closure of the wound.
Complex Repair And Single Advancement Flap Text: The defect edges were debeveled with a #15 scalpel blade.  The primary defect was closed partially with a complex linear closure.  Given the location of the remaining defect, shape of the defect and the proximity to free margins a single advancement flap was deemed most appropriate for complete closure of the defect.  Using a sterile surgical marker, an appropriate advancement flap was drawn incorporating the defect and placing the expected incisions within the relaxed skin tension lines where possible.    The area thus outlined was incised deep to adipose tissue with a #15 scalpel blade.  The skin margins were undermined to an appropriate distance in all directions utilizing iris scissors.
Complex Repair And Double Advancement Flap Text: The defect edges were debeveled with a #15 scalpel blade.  The primary defect was closed partially with a complex linear closure.  Given the location of the remaining defect, shape of the defect and the proximity to free margins a double advancement flap was deemed most appropriate for complete closure of the defect.  Using a sterile surgical marker, an appropriate advancement flap was drawn incorporating the defect and placing the expected incisions within the relaxed skin tension lines where possible.    The area thus outlined was incised deep to adipose tissue with a #15 scalpel blade.  The skin margins were undermined to an appropriate distance in all directions utilizing iris scissors.
Complex Repair And Modified Advancement Flap Text: The defect edges were debeveled with a #15 scalpel blade.  The primary defect was closed partially with a complex linear closure.  Given the location of the remaining defect, shape of the defect and the proximity to free margins a modified advancement flap was deemed most appropriate for complete closure of the defect.  Using a sterile surgical marker, an appropriate advancement flap was drawn incorporating the defect and placing the expected incisions within the relaxed skin tension lines where possible.    The area thus outlined was incised deep to adipose tissue with a #15 scalpel blade.  The skin margins were undermined to an appropriate distance in all directions utilizing iris scissors.
Complex Repair And A-T Advancement Flap Text: The defect edges were debeveled with a #15 scalpel blade.  The primary defect was closed partially with a complex linear closure.  Given the location of the remaining defect, shape of the defect and the proximity to free margins an A-T advancement flap was deemed most appropriate for complete closure of the defect.  Using a sterile surgical marker, an appropriate advancement flap was drawn incorporating the defect and placing the expected incisions within the relaxed skin tension lines where possible.    The area thus outlined was incised deep to adipose tissue with a #15 scalpel blade.  The skin margins were undermined to an appropriate distance in all directions utilizing iris scissors.
Complex Repair And O-T Advancement Flap Text: The defect edges were debeveled with a #15 scalpel blade.  The primary defect was closed partially with a complex linear closure.  Given the location of the remaining defect, shape of the defect and the proximity to free margins an O-T advancement flap was deemed most appropriate for complete closure of the defect.  Using a sterile surgical marker, an appropriate advancement flap was drawn incorporating the defect and placing the expected incisions within the relaxed skin tension lines where possible.    The area thus outlined was incised deep to adipose tissue with a #15 scalpel blade.  The skin margins were undermined to an appropriate distance in all directions utilizing iris scissors.
Complex Repair And O-L Flap Text: The defect edges were debeveled with a #15 scalpel blade.  The primary defect was closed partially with a complex linear closure.  Given the location of the remaining defect, shape of the defect and the proximity to free margins an O-L flap was deemed most appropriate for complete closure of the defect.  Using a sterile surgical marker, an appropriate flap was drawn incorporating the defect and placing the expected incisions within the relaxed skin tension lines where possible.    The area thus outlined was incised deep to adipose tissue with a #15 scalpel blade.  The skin margins were undermined to an appropriate distance in all directions utilizing iris scissors.
Complex Repair And Bilobe Flap Text: The defect edges were debeveled with a #15 scalpel blade.  The primary defect was closed partially with a complex linear closure.  Given the location of the remaining defect, shape of the defect and the proximity to free margins a bilobe flap was deemed most appropriate for complete closure of the defect.  Using a sterile surgical marker, an appropriate advancement flap was drawn incorporating the defect and placing the expected incisions within the relaxed skin tension lines where possible.    The area thus outlined was incised deep to adipose tissue with a #15 scalpel blade.  The skin margins were undermined to an appropriate distance in all directions utilizing iris scissors.
Complex Repair And Melolabial Flap Text: The defect edges were debeveled with a #15 scalpel blade.  The primary defect was closed partially with a complex linear closure.  Given the location of the remaining defect, shape of the defect and the proximity to free margins a melolabial flap was deemed most appropriate for complete closure of the defect.  Using a sterile surgical marker, an appropriate advancement flap was drawn incorporating the defect and placing the expected incisions within the relaxed skin tension lines where possible.    The area thus outlined was incised deep to adipose tissue with a #15 scalpel blade.  The skin margins were undermined to an appropriate distance in all directions utilizing iris scissors.
Complex Repair And Rotation Flap Text: The defect edges were debeveled with a #15 scalpel blade.  The primary defect was closed partially with a complex linear closure.  Given the location of the remaining defect, shape of the defect and the proximity to free margins a rotation flap was deemed most appropriate for complete closure of the defect.  Using a sterile surgical marker, an appropriate advancement flap was drawn incorporating the defect and placing the expected incisions within the relaxed skin tension lines where possible.    The area thus outlined was incised deep to adipose tissue with a #15 scalpel blade.  The skin margins were undermined to an appropriate distance in all directions utilizing iris scissors.
Complex Repair And Rhombic Flap Text: The defect edges were debeveled with a #15 scalpel blade.  The primary defect was closed partially with a complex linear closure.  Given the location of the remaining defect, shape of the defect and the proximity to free margins a rhombic flap was deemed most appropriate for complete closure of the defect.  Using a sterile surgical marker, an appropriate advancement flap was drawn incorporating the defect and placing the expected incisions within the relaxed skin tension lines where possible.    The area thus outlined was incised deep to adipose tissue with a #15 scalpel blade.  The skin margins were undermined to an appropriate distance in all directions utilizing iris scissors.
Complex Repair And Transposition Flap Text: The defect edges were debeveled with a #15 scalpel blade.  The primary defect was closed partially with a complex linear closure.  Given the location of the remaining defect, shape of the defect and the proximity to free margins a transposition flap was deemed most appropriate for complete closure of the defect.  Using a sterile surgical marker, an appropriate advancement flap was drawn incorporating the defect and placing the expected incisions within the relaxed skin tension lines where possible.    The area thus outlined was incised deep to adipose tissue with a #15 scalpel blade.  The skin margins were undermined to an appropriate distance in all directions utilizing iris scissors.
Complex Repair And V-Y Plasty Text: The defect edges were debeveled with a #15 scalpel blade.  The primary defect was closed partially with a complex linear closure.  Given the location of the remaining defect, shape of the defect and the proximity to free margins a V-Y plasty was deemed most appropriate for complete closure of the defect.  Using a sterile surgical marker, an appropriate advancement flap was drawn incorporating the defect and placing the expected incisions within the relaxed skin tension lines where possible.    The area thus outlined was incised deep to adipose tissue with a #15 scalpel blade.  The skin margins were undermined to an appropriate distance in all directions utilizing iris scissors.
Complex Repair And M Plasty Text: The defect edges were debeveled with a #15 scalpel blade.  The primary defect was closed partially with a complex linear closure.  Given the location of the remaining defect, shape of the defect and the proximity to free margins an M plasty was deemed most appropriate for complete closure of the defect.  Using a sterile surgical marker, an appropriate advancement flap was drawn incorporating the defect and placing the expected incisions within the relaxed skin tension lines where possible.    The area thus outlined was incised deep to adipose tissue with a #15 scalpel blade.  The skin margins were undermined to an appropriate distance in all directions utilizing iris scissors.
Complex Repair And Double M Plasty Text: The defect edges were debeveled with a #15 scalpel blade.  The primary defect was closed partially with a complex linear closure.  Given the location of the remaining defect, shape of the defect and the proximity to free margins a double M plasty was deemed most appropriate for complete closure of the defect.  Using a sterile surgical marker, an appropriate advancement flap was drawn incorporating the defect and placing the expected incisions within the relaxed skin tension lines where possible.    The area thus outlined was incised deep to adipose tissue with a #15 scalpel blade.  The skin margins were undermined to an appropriate distance in all directions utilizing iris scissors.
Complex Repair And W Plasty Text: The defect edges were debeveled with a #15 scalpel blade.  The primary defect was closed partially with a complex linear closure.  Given the location of the remaining defect, shape of the defect and the proximity to free margins a W plasty was deemed most appropriate for complete closure of the defect.  Using a sterile surgical marker, an appropriate advancement flap was drawn incorporating the defect and placing the expected incisions within the relaxed skin tension lines where possible.    The area thus outlined was incised deep to adipose tissue with a #15 scalpel blade.  The skin margins were undermined to an appropriate distance in all directions utilizing iris scissors.
Complex Repair And Z Plasty Text: The defect edges were debeveled with a #15 scalpel blade.  The primary defect was closed partially with a complex linear closure.  Given the location of the remaining defect, shape of the defect and the proximity to free margins a Z plasty was deemed most appropriate for complete closure of the defect.  Using a sterile surgical marker, an appropriate advancement flap was drawn incorporating the defect and placing the expected incisions within the relaxed skin tension lines where possible.    The area thus outlined was incised deep to adipose tissue with a #15 scalpel blade.  The skin margins were undermined to an appropriate distance in all directions utilizing iris scissors.
Complex Repair And Dorsal Nasal Flap Text: The defect edges were debeveled with a #15 scalpel blade.  The primary defect was closed partially with a complex linear closure.  Given the location of the remaining defect, shape of the defect and the proximity to free margins a dorsal nasal flap was deemed most appropriate for complete closure of the defect.  Using a sterile surgical marker, an appropriate flap was drawn incorporating the defect and placing the expected incisions within the relaxed skin tension lines where possible.    The area thus outlined was incised deep to adipose tissue with a #15 scalpel blade.  The skin margins were undermined to an appropriate distance in all directions utilizing iris scissors.
Complex Repair And Ftsg Text: The defect edges were debeveled with a #15 scalpel blade.  The primary defect was closed partially with a complex linear closure.  Given the location of the defect, shape of the defect and the proximity to free margins a full thickness skin graft was deemed most appropriate to repair the remaining defect.  The graft was trimmed to fit the size of the remaining defect.  The graft was then placed in the primary defect, oriented appropriately, and sutured into place.
Complex Repair And Burow's Graft Text: The defect edges were debeveled with a #15 scalpel blade.  The primary defect was closed partially with a complex linear closure.  Given the location of the defect, shape of the defect, the proximity to free margins and the presence of a standing cone deformity a Burow's graft was deemed most appropriate to repair the remaining defect.  The graft was trimmed to fit the size of the remaining defect.  The graft was then placed in the primary defect, oriented appropriately, and sutured into place.
Complex Repair And Split-Thickness Skin Graft Text: The defect edges were debeveled with a #15 scalpel blade.  The primary defect was closed partially with a complex linear closure.  Given the location of the defect, shape of the defect and the proximity to free margins a split thickness skin graft was deemed most appropriate to repair the remaining defect.  The graft was trimmed to fit the size of the remaining defect.  The graft was then placed in the primary defect, oriented appropriately, and sutured into place.
Complex Repair And Epidermal Autograft Text: The defect edges were debeveled with a #15 scalpel blade.  The primary defect was closed partially with a complex linear closure.  Given the location of the defect, shape of the defect and the proximity to free margins an epidermal autograft was deemed most appropriate to repair the remaining defect.  The graft was trimmed to fit the size of the remaining defect.  The graft was then placed in the primary defect, oriented appropriately, and sutured into place.
Complex Repair And Dermal Autograft Text: The defect edges were debeveled with a #15 scalpel blade.  The primary defect was closed partially with a complex linear closure.  Given the location of the defect, shape of the defect and the proximity to free margins an dermal autograft was deemed most appropriate to repair the remaining defect.  The graft was trimmed to fit the size of the remaining defect.  The graft was then placed in the primary defect, oriented appropriately, and sutured into place.
Complex Repair And Tissue Cultured Epidermal Autograft Text: The defect edges were debeveled with a #15 scalpel blade.  The primary defect was closed partially with a complex linear closure.  Given the location of the defect, shape of the defect and the proximity to free margins an tissue cultured epidermal autograft was deemed most appropriate to repair the remaining defect.  The graft was trimmed to fit the size of the remaining defect.  The graft was then placed in the primary defect, oriented appropriately, and sutured into place.
Complex Repair And Xenograft Text: The defect edges were debeveled with a #15 scalpel blade.  The primary defect was closed partially with a complex linear closure.  Given the location of the defect, shape of the defect and the proximity to free margins a xenograft was deemed most appropriate to repair the remaining defect.  The graft was trimmed to fit the size of the remaining defect.  The graft was then placed in the primary defect, oriented appropriately, and sutured into place.
Complex Repair And Skin Substitute Graft Text: The defect edges were debeveled with a #15 scalpel blade.  The primary defect was closed partially with a complex linear closure.  Given the location of the remaining defect, shape of the defect and the proximity to free margins a skin substitute graft was deemed most appropriate to repair the remaining defect.  The graft was trimmed to fit the size of the remaining defect.  The graft was then placed in the primary defect, oriented appropriately, and sutured into place.
Path Notes (To The Dermatopathologist): Please check margins. NOTCHED ANTERIOR
Consent was obtained from the patient. The risks and benefits to therapy were discussed in detail.
Post-Care Instructions: I reviewed with the patient in detail post-care instructions. Patient is not to engage in any heavy lifting, exercise, or swimming for the next 14 days. Should the patient develop any fevers, chills, bleeding, severe pain patient will contact the office immediately.
Home Suture Removal Text: Patient was provided a home suture removal kit and will remove their sutures at home.  If they have any questions or difficulties they will call the office.
Where Do You Want The Question To Include Opioid Counseling Located?: Case Summary Tab
Information: Selecting Yes will display possible errors in your note based on the variables you have selected. This validation is only offered as a suggestion for you. PLEASE NOTE THAT THE VALIDATION TEXT WILL BE REMOVED WHEN YOU FINALIZE YOUR NOTE. IF YOU WANT TO FAX A PRELIMINARY NOTE YOU WILL NEED TO TOGGLE THIS TO 'NO' IF YOU DO NOT WANT IT IN YOUR FAXED NOTE.

## 2024-02-26 ENCOUNTER — APPOINTMENT (RX ONLY)
Dept: URBAN - METROPOLITAN AREA CLINIC 102 | Facility: CLINIC | Age: 74
Setting detail: DERMATOLOGY
End: 2024-02-26

## 2024-02-26 PROBLEM — C44.311 BASAL CELL CARCINOMA OF SKIN OF NOSE: Status: ACTIVE | Noted: 2024-02-26

## 2024-02-26 PROCEDURE — ? CONSULTATION FOR ELECTRON BEAM THERAPY

## 2024-02-26 PROCEDURE — 99204 OFFICE O/P NEW MOD 45 MIN: CPT | Mod: 24

## 2024-02-26 PROCEDURE — ? PATIENT SPECIFIC COUNSELING

## 2024-02-26 NOTE — PROCEDURE: CONSULTATION FOR ELECTRON BEAM THERAPY
Previous Chemotherapy History: No
Other Plan: We discussed a 4-5 week treatment plan. The patient selected a 5 week regimen, he will temporarily stop taking his baby aspirin and fish oils pills to decrease risk for nasal bleeding.
Size Of Lesion: 1
Referring Provider: Aysha Rodriguez MD
X Size Of Lesion In Cm (Optional): 0.6
Detail Level: Detailed

## 2024-02-26 NOTE — HPI: BIOPSY PROVEN BCC
How Severe Is Your Bcc?: moderate
When Was The Bcc Biopsied? (Optional): on 1/29/24
Accession # (Optional): [id number]
Additional History: Pt is taking baby aspirin and fish oil pills daily.

## 2024-03-04 ENCOUNTER — APPOINTMENT (RX ONLY)
Dept: URBAN - METROPOLITAN AREA CLINIC 94 | Facility: CLINIC | Age: 74
Setting detail: DERMATOLOGY
End: 2024-03-04

## 2024-03-04 DIAGNOSIS — Z48.02 ENCOUNTER FOR REMOVAL OF SUTURES: ICD-10-CM

## 2024-03-04 PROCEDURE — ? SUTURE REMOVAL (GLOBAL PERIOD)

## 2024-03-04 ASSESSMENT — LOCATION ZONE DERM: LOCATION ZONE: ARM

## 2024-03-04 ASSESSMENT — LOCATION SIMPLE DESCRIPTION DERM: LOCATION SIMPLE: LEFT UPPER ARM

## 2024-03-04 ASSESSMENT — LOCATION DETAILED DESCRIPTION DERM: LOCATION DETAILED: LEFT ANTERIOR DISTAL UPPER ARM

## 2024-03-04 NOTE — PROCEDURE: SUTURE REMOVAL (GLOBAL PERIOD)
Body Location Override (Optional - Billing Will Still Be Based On Selected Body Map Location If Applicable): left lateral distal upper arm
Detail Level: Detailed
Add 12504 Cpt? (Important Note: In 2017 The Use Of 92449 Is Being Tracked By Cms To Determine Future Global Period Reimbursement For Global Periods): no

## 2024-03-07 ENCOUNTER — APPOINTMENT (RX ONLY)
Dept: URBAN - METROPOLITAN AREA CLINIC 102 | Facility: CLINIC | Age: 74
Setting detail: DERMATOLOGY
End: 2024-03-07

## 2024-03-07 PROBLEM — C44.311 BASAL CELL CARCINOMA OF SKIN OF NOSE: Status: ACTIVE | Noted: 2024-03-07

## 2024-03-07 PROCEDURE — ? CLINICAL TREATMENT PLAN FOR RADIATION THERAPY

## 2024-03-07 PROCEDURE — 77290 THER RAD SIMULAJ FIELD CPLX: CPT

## 2024-03-07 PROCEDURE — ? INITIAL COMPLEX SIMULATION

## 2024-03-07 NOTE — PROCEDURE: INITIAL COMPLEX SIMULATION
Intro Statement For Treatment Plan (Will Not Render If Left Blank): Records, reports, pathology, and physical exam have been completed, interpreted and reviewed to assist in defining the course of radiation therapy treatment. Parameters interpreted include tumor histology, size, location, extent of disease and prior medical history. These factors will integrate into radiation field design. A complex electron beam simulation is necessary to accomplish a reproducible beam arrangement, field size and target volume with which to treat the tumor. Beam modifying devices will be designed and utilized as necessary to optimize the treatment and to best spare normal tissues. Complex blocking will be performed to minimize radiation scatter (penumbra), shape to target volume, and to best protect adjacent and underlying normal tissues. Fields will be verified on the patient prior to radiation delivery.
Bolus Thickness In Cm: 0.5
Acetate Template Statement (Will Not Render If Left Blank): An acetate template will be used to fabricate a custom lead shielding device to allow for lead on skin collimation. This type of shielding will best limit beam penumbra. Additional shielding will also be added to protect adjacent strutures.
Pathology: Use Selected EMA Impression
Closing Statement (Will Not Render If Left Blank): The patient tolerated the simulation well and appears to have had all of their questions answered to their satisfaction. The patient will return for field verification, simple simulation before radiation is delivered to confirm patient positioning and block shaping and positioning.
Detail Level: Detailed
Dosing Schedule: 5 days a week
Daily Dose (Include Units): 250cGy
Treatment Length (Include Units): 5 weeks
Intro Statement (Will Not Render If Left Blank): I then designed a radiation field to include the gross lesion (GTV) with additional margin that accounted for both potential subclinical microscopic extension (CTV), as well as for the beam characteristics of superficial electrons (PTV). Care was taken in designing the field near adjacent normal tissue structures. The target volume was drawn on the skin surface with a permanent marker and then the design with reference marks was carefully traced onto an acetate template. Digital photographs were taken.
Position: supine
Isodose: 90
Custom Bolus Type: custom mixed, molded, and shaped
Send Clinical Treatment Planning Code To Pm?: No - Documentation Only
Cummulative Dose (Include Units): 5500cGy
Use Custom Eyeshields: Yes
Energy In Mev: 6
Eye Shield Statement (Will Not Render If Left Blank): External eye shields will be placed daily to limit scatter dose to the lenses of the eyes. Customized eye shielding is safer than reusable eye shielding.  We therefore will custom design and fabricate bilateral eye shields made of shaped lead and covered by wax, for each of our patients. These will be used only during the course of treatment and then destroyed and constitute a complex fabrication process and device.

## 2024-03-07 NOTE — PROCEDURE: CLINICAL TREATMENT PLAN FOR RADIATION THERAPY
Energy In Mev: 6
Detail Level: Detailed
Position: supine
Which Code: 36789
Daily Dose (Include Units): 250cGy
Cummulative Dose (Include Units): 5500cGy
Send Cpt Codes To Pm?: No
Treatment Length (Include Units): 5 weeks
Pathology: Use Selected EMA Impression
Dosing Schedule: 5 days a week
Intro Statement (Will Not Render If Left Blank): Records, reports, pathology, and physical exam have been completed, interpreted and reviewed to assist in defining the course of radiation therapy treatment. Parameters interpreted include tumor histology, size, location, extent of disease and prior medical history. These factors will integrate into radiation field design. A complex electron beam simulation is necessary to accomplish a reproducible beam arrangement, field size and target volume with which to treat the tumor. Beam modifying devices will be designed and utilized as necessary to optimize the treatment and to best spare normal tissues. Complex blocking will be performed to minimize radiation scatter (penumbra), shape to target volume, and to best protect adjacent and underlying normal tissues. Fields will be verified on the patient prior to radiation delivery.

## 2024-03-11 ENCOUNTER — APPOINTMENT (RX ONLY)
Dept: URBAN - METROPOLITAN AREA CLINIC 94 | Facility: CLINIC | Age: 74
Setting detail: DERMATOLOGY
End: 2024-03-11

## 2024-03-11 ENCOUNTER — APPOINTMENT (RX ONLY)
Dept: URBAN - METROPOLITAN AREA CLINIC 102 | Facility: CLINIC | Age: 74
Setting detail: DERMATOLOGY
End: 2024-03-11

## 2024-03-11 DIAGNOSIS — Z48.817 ENCOUNTER FOR SURGICAL AFTERCARE FOLLOWING SURGERY ON THE SKIN AND SUBCUTANEOUS TISSUE: ICD-10-CM

## 2024-03-11 PROBLEM — C44.311 BASAL CELL CARCINOMA OF SKIN OF NOSE: Status: ACTIVE | Noted: 2024-03-11

## 2024-03-11 PROCEDURE — ? SIMPLE SIMULATION - BLOCK CHECK

## 2024-03-11 PROCEDURE — ? ELECTRON BEAM THERAPY

## 2024-03-11 PROCEDURE — ? COUNSELING

## 2024-03-11 PROCEDURE — 77280 THER RAD SIMULAJ FIELD SMPL: CPT

## 2024-03-11 ASSESSMENT — LOCATION DETAILED DESCRIPTION DERM: LOCATION DETAILED: LEFT DISTAL LATERAL POSTERIOR UPPER ARM

## 2024-03-11 ASSESSMENT — LOCATION SIMPLE DESCRIPTION DERM: LOCATION SIMPLE: LEFT UPPER ARM

## 2024-03-11 ASSESSMENT — LOCATION ZONE DERM: LOCATION ZONE: ARM

## 2024-03-11 NOTE — PROCEDURE: ELECTRON BEAM THERAPY
Ebt Cpt Code (Please Add Code Details Below): 
Assessment: Appropriate reaction
Radiation Units: cGy
Date Of Fraction 1: 03/11/2024
Additional Change To Daily Dosage Administered Mid Treatment?: No
Date Of Fraction 3: 03/13/2024
Mild, Moderate, Brisk Erythema Or Dry Desquamation Counseling: The patient was instructed in meticulous wound care and counseled on potential and expected side effects of treatment and reasonable expectations for the time to heal. They appeared to have all of their questions answered to their satisfaction. They were recommended to rinse the treatment site with mild soap and water (recommended Dove, Neutrogena or Cetaphil). After rinsing the treatment site twice a day they are to apply a pea-sized amount of Aquaphor healing ointment twice daily. Aquaphor samples were provided. The patient understands to call with any questions, concerns or difficulties. They were informed of the potentital for infection and counseled on common signs and symptoms of infection including skin redness extending outside of the treatment area, enlargement or skin breakdown, significant warmth, pain, fever or chills or sweats. They voiced an understanding to contact us immediately if any of these symptoms develop. Their follow up appointment was scheduled. They were also instructed to follow-up with dermatology for skin cancer surveillance.
Total Planned Fractions: 22
Date Of Fraction 2: 03/12/2024
Date Of Fraction 4: 03/14/2024
Detail Level: Detailed
Daily Dosage Administered: 250
Early, Moist Desquamation Counseling: The patient was instructed in meticulous wound care and counseled on potential and expected side effects of treatment and reasonable expectations for the time to heal. They appeared to have all of their questions answered to their satisfaction. They were recommended to cleanse the treatment site with dilute hydrogen peroxide and water (using 50:50 ratio). They were told how to apply the solution gently with a cotton ball twice daily. After cleaning, they were instructed to pat the area dry with a soft cloth and then apply a small amount of Silvadene 1% cream twice daily. They were told to return to the clinic in 7 days for re-evaluation. The patient understands to call with any questions, concerns or difficulties. They were informed of the potentital for infection and counseled on common signs and symptoms of infection including skin redness extending outside of the treatment area, enlargement or skin breakdown, significant warmth, pain, fever or chills or sweats. They voiced an understanding to contact us immediately if any of these symptoms develop. Their follow up appointment was scheduled. They were also instructed to follow-up with dermatology for skin cancer surveillance.
Date Of Fraction 5: 03/15/2024
Location Override (Location Will Render As Ema Body Location If Left Blank): left nasal dorsum
Dimensions-X Axis In Cm: 0
Total Rx Dosage: 5500

## 2024-03-11 NOTE — PROCEDURE: SIMPLE SIMULATION - BLOCK CHECK
Detail Level: Simple
Custom Bolus Type: custom mixed, molded, and shaped
Position: supine
Bolus Thickness In Cm: Next 0.5
Closing Statement (Will Not Render If Left Blank): Working with the physician, the therapist adjusted the machine gantry, table, and collimator and adjusted patient positioning to allow an appositional electron beam. SSD measurements were verified using the projected optical distance indicator light and room lasers. The field was positioned at 100cm SSD to the top of the bolus material. The machine parameters were recorded. The treatment light field was photographed projected onto the patient's skin. Further digital photographs were taken and will be used as a reference. The patient tolerated the simple simulation well and appears to have had all of their questions answered to their satisfaction.

## 2024-03-14 ENCOUNTER — APPOINTMENT (RX ONLY)
Dept: URBAN - METROPOLITAN AREA CLINIC 102 | Facility: CLINIC | Age: 74
Setting detail: DERMATOLOGY
End: 2024-03-14

## 2024-03-14 PROBLEM — C44.311 BASAL CELL CARCINOMA OF SKIN OF NOSE: Status: ACTIVE | Noted: 2024-03-14

## 2024-03-14 PROCEDURE — 77290 THER RAD SIMULAJ FIELD CPLX: CPT

## 2024-03-14 PROCEDURE — ? COMPLEX SIMULATION - REDUCED FIELD

## 2024-03-14 NOTE — PROCEDURE: COMPLEX SIMULATION - REDUCED FIELD
Custom Bolus Type: custom mixed, molded, and shaped
Detail Level: Simple
Bolus Thickness In Cm: .8
Acetate Template Statement (Will Not Render If Left Blank): The acetate template will be used to fabricate a custom lead on skin shielding device to allow for lead on skin collimation. This type of shielding will best limit beam penumbra and define the field.
Energy In Mev: 6
Position: supine
Isodose: 90
Pathology: Use Selected EMA Impression
Closing Statement (Will Not Render If Left Blank): The patient tolerated the complex electron beam simulation well and will continue on radiotherapy using the modified field. The patient will return for a field verification simulation before radiation is delivered to confirm patient positioning and block fabrication parameters.
Intro Statement (Will Not Render If Left Blank): A new radiation electron beam field was designed to include the gross lesion (GTV) with additional margin that accounted for both potential subclinical microscopic extension (CTV), as well as for the beam characteristics of superficial electrons (PTV). This field took into account the changes in the volume of the tumor that have occurred during radiation therapy. Care was taken in designing the field near adjacent normal tissue structures. The target volume was drawn on the skin surface with a permanent marker and then the design with reference marks was carefully traced onto an acetate template. Digital photographs were taken.

## 2024-03-18 ENCOUNTER — NON-APPOINTMENT (OUTPATIENT)
Age: 74
End: 2024-03-18

## 2024-03-18 ENCOUNTER — APPOINTMENT (RX ONLY)
Dept: URBAN - METROPOLITAN AREA CLINIC 102 | Facility: CLINIC | Age: 74
Setting detail: DERMATOLOGY
End: 2024-03-18

## 2024-03-18 ENCOUNTER — APPOINTMENT (OUTPATIENT)
Dept: ELECTROPHYSIOLOGY | Facility: CLINIC | Age: 74
End: 2024-03-18
Payer: COMMERCIAL

## 2024-03-18 PROBLEM — C44.311 BASAL CELL CARCINOMA OF SKIN OF NOSE: Status: ACTIVE | Noted: 2024-03-18

## 2024-03-18 PROCEDURE — ? ELECTRON BEAM THERAPY

## 2024-03-18 PROCEDURE — 93298 REM INTERROG DEV EVAL SCRMS: CPT

## 2024-03-18 NOTE — PROCEDURE: ELECTRON BEAM THERAPY
Ebt Cpt Code (Please Add Code Details Below): 
Assessment: Appropriate reaction
Date Of Fraction 9: 03/21/2024
Radiation Units: cGy
Date Of Fraction 1: 03/11/2024
Additional Change To Daily Dosage Administered Mid Treatment?: No
Date Of Fraction 10: 03/22/2024
Date Of Fraction 3: 03/13/2024
Mild, Moderate, Brisk Erythema Or Dry Desquamation Counseling: The patient was instructed in meticulous wound care and counseled on potential and expected side effects of treatment and reasonable expectations for the time to heal. They appeared to have all of their questions answered to their satisfaction. They were recommended to rinse the treatment site with mild soap and water (recommended Dove, Neutrogena or Cetaphil). After rinsing the treatment site twice a day they are to apply a pea-sized amount of Aquaphor healing ointment twice daily. Aquaphor samples were provided. The patient understands to call with any questions, concerns or difficulties. They were informed of the potentital for infection and counseled on common signs and symptoms of infection including skin redness extending outside of the treatment area, enlargement or skin breakdown, significant warmth, pain, fever or chills or sweats. They voiced an understanding to contact us immediately if any of these symptoms develop. Their follow up appointment was scheduled. They were also instructed to follow-up with dermatology for skin cancer surveillance.
Total Planned Fractions: 22
Date Of Fraction 2: 03/12/2024
Date Of Fraction 4: 03/14/2024
Detail Level: Detailed
Daily Dosage Administered: 250
Early, Moist Desquamation Counseling: The patient was instructed in meticulous wound care and counseled on potential and expected side effects of treatment and reasonable expectations for the time to heal. They appeared to have all of their questions answered to their satisfaction. They were recommended to cleanse the treatment site with dilute hydrogen peroxide and water (using 50:50 ratio). They were told how to apply the solution gently with a cotton ball twice daily. After cleaning, they were instructed to pat the area dry with a soft cloth and then apply a small amount of Silvadene 1% cream twice daily. They were told to return to the clinic in 7 days for re-evaluation. The patient understands to call with any questions, concerns or difficulties. They were informed of the potentital for infection and counseled on common signs and symptoms of infection including skin redness extending outside of the treatment area, enlargement or skin breakdown, significant warmth, pain, fever or chills or sweats. They voiced an understanding to contact us immediately if any of these symptoms develop. Their follow up appointment was scheduled. They were also instructed to follow-up with dermatology for skin cancer surveillance.
Date Of Fraction 5: 03/15/2024
Location Override (Location Will Render As Ema Body Location If Left Blank): left nasal dorsum
Date Of Fraction 6: 03/18/2024
Date Of Fraction 7: 03/19/2024
Dimensions-X Axis In Cm: 0
Date Of Fraction 8: 03/20/2024
Total Rx Dosage: 5500

## 2024-03-21 ENCOUNTER — APPOINTMENT (RX ONLY)
Dept: URBAN - METROPOLITAN AREA CLINIC 102 | Facility: CLINIC | Age: 74
Setting detail: DERMATOLOGY
End: 2024-03-21

## 2024-03-21 PROBLEM — C44.311 BASAL CELL CARCINOMA OF SKIN OF NOSE: Status: ACTIVE | Noted: 2024-03-21

## 2024-03-21 PROCEDURE — ? SIMPLE SIMULATION - BLOCK CHECK

## 2024-03-21 PROCEDURE — 77280 THER RAD SIMULAJ FIELD SMPL: CPT

## 2024-03-21 NOTE — PROCEDURE: SIMPLE SIMULATION - BLOCK CHECK
Closing Statement (Will Not Render If Left Blank): Working with the physician, the therapist adjusted the machine gantry, table, and collimator and adjusted patient positioning to allow an appositional electron beam. SSD measurements were verified using the projected optical distance indicator light and room lasers. The field was positioned at 100cm SSD to the top of the bolus material. The machine parameters were recorded. The treatment light field was photographed projected onto the patient's skin. Further digital photographs were taken and will be used as a reference. The patient tolerated the simple simulation well and appears to have had all of their questions answered to their satisfaction.
Position: supine
Bolus Thickness In Cm: Next 0.5
Custom Bolus Type: custom mixed, molded, and shaped
Detail Level: Simple

## 2024-03-25 ENCOUNTER — APPOINTMENT (RX ONLY)
Dept: URBAN - METROPOLITAN AREA CLINIC 102 | Facility: CLINIC | Age: 74
Setting detail: DERMATOLOGY
End: 2024-03-25

## 2024-03-25 PROBLEM — C44.311 BASAL CELL CARCINOMA OF SKIN OF NOSE: Status: ACTIVE | Noted: 2024-03-25

## 2024-03-25 PROCEDURE — ? ELECTRON BEAM THERAPY

## 2024-03-25 NOTE — PROCEDURE: ELECTRON BEAM THERAPY
Ebt Cpt Code (Please Add Code Details Below): 
Assessment: Appropriate reaction
Date Of Fraction 9: 03/21/2024
Radiation Units: cGy
Date Of Fraction 11: 03/25/2024
Date Of Fraction 1: 03/11/2024
Additional Change To Daily Dosage Administered Mid Treatment?: No
Date Of Fraction 10: 03/22/2024
Date Of Fraction 12: 03/26/2024
Date Of Fraction 3: 03/13/2024
Mild, Moderate, Brisk Erythema Or Dry Desquamation Counseling: The patient was instructed in meticulous wound care and counseled on potential and expected side effects of treatment and reasonable expectations for the time to heal. They appeared to have all of their questions answered to their satisfaction. They were recommended to rinse the treatment site with mild soap and water (recommended Dove, Neutrogena or Cetaphil). After rinsing the treatment site twice a day they are to apply a pea-sized amount of Aquaphor healing ointment twice daily. Aquaphor samples were provided. The patient understands to call with any questions, concerns or difficulties. They were informed of the potentital for infection and counseled on common signs and symptoms of infection including skin redness extending outside of the treatment area, enlargement or skin breakdown, significant warmth, pain, fever or chills or sweats. They voiced an understanding to contact us immediately if any of these symptoms develop. Their follow up appointment was scheduled. They were also instructed to follow-up with dermatology for skin cancer surveillance.
Total Planned Fractions: 22
Date Of Fraction 2: 03/12/2024
Date Of Fraction 4: 03/14/2024
Detail Level: Detailed
Date Of Fraction 13: 03/27/2024
Daily Dosage Administered: 250
Early, Moist Desquamation Counseling: The patient was instructed in meticulous wound care and counseled on potential and expected side effects of treatment and reasonable expectations for the time to heal. They appeared to have all of their questions answered to their satisfaction. They were recommended to cleanse the treatment site with dilute hydrogen peroxide and water (using 50:50 ratio). They were told how to apply the solution gently with a cotton ball twice daily. After cleaning, they were instructed to pat the area dry with a soft cloth and then apply a small amount of Silvadene 1% cream twice daily. They were told to return to the clinic in 7 days for re-evaluation. The patient understands to call with any questions, concerns or difficulties. They were informed of the potentital for infection and counseled on common signs and symptoms of infection including skin redness extending outside of the treatment area, enlargement or skin breakdown, significant warmth, pain, fever or chills or sweats. They voiced an understanding to contact us immediately if any of these symptoms develop. Their follow up appointment was scheduled. They were also instructed to follow-up with dermatology for skin cancer surveillance.
Date Of Fraction 5: 03/15/2024
Location Override (Location Will Render As Ema Body Location If Left Blank): left nasal dorsum
Date Of Fraction 14: 03/28/2024
Date Of Fraction 15: 03/29/2024
Date Of Fraction 6: 03/18/2024
Date Of Fraction 7: 03/19/2024
Dimensions-X Axis In Cm: 0
Date Of Fraction 8: 03/20/2024
Total Rx Dosage: 5500

## 2024-04-01 ENCOUNTER — APPOINTMENT (RX ONLY)
Dept: URBAN - METROPOLITAN AREA CLINIC 102 | Facility: CLINIC | Age: 74
Setting detail: DERMATOLOGY
End: 2024-04-01

## 2024-04-01 PROBLEM — C44.311 BASAL CELL CARCINOMA OF SKIN OF NOSE: Status: ACTIVE | Noted: 2024-04-01

## 2024-04-01 PROCEDURE — ? ELECTRON BEAM THERAPY

## 2024-04-01 NOTE — PROCEDURE: ELECTRON BEAM THERAPY
Ebt Cpt Code (Please Add Code Details Below): 
Assessment: Appropriate reaction
Date Of Fraction 9: 03/21/2024
Radiation Units: cGy
Date Of Fraction 18: 04/03/2024
Date Of Fraction 20: 04/05/2024
Date Of Fraction 11: 03/25/2024
Date Of Fraction 1: 03/11/2024
Additional Change To Daily Dosage Administered Mid Treatment?: No
Date Of Fraction 19: 04/04/2024
Date Of Fraction 10: 03/22/2024
Date Of Fraction 12: 03/26/2024
Date Of Fraction 3: 03/13/2024
Mild, Moderate, Brisk Erythema Or Dry Desquamation Counseling: The patient was instructed in meticulous wound care and counseled on potential and expected side effects of treatment and reasonable expectations for the time to heal. They appeared to have all of their questions answered to their satisfaction. They were recommended to rinse the treatment site with mild soap and water (recommended Dove, Neutrogena or Cetaphil). After rinsing the treatment site twice a day they are to apply a pea-sized amount of Aquaphor healing ointment twice daily. Aquaphor samples were provided. The patient understands to call with any questions, concerns or difficulties. They were informed of the potentital for infection and counseled on common signs and symptoms of infection including skin redness extending outside of the treatment area, enlargement or skin breakdown, significant warmth, pain, fever or chills or sweats. They voiced an understanding to contact us immediately if any of these symptoms develop. Their follow up appointment was scheduled. They were also instructed to follow-up with dermatology for skin cancer surveillance.
Total Planned Fractions: 20
Skin Reaction?: expected skin reaction
Date Of Fraction 2: 03/12/2024
Date Of Fraction 4: 03/14/2024
Detail Level: Detailed
Date Of Fraction 13: 03/27/2024
Daily Dosage Administered: 250
Early, Moist Desquamation Counseling: The patient was instructed in meticulous wound care and counseled on potential and expected side effects of treatment and reasonable expectations for the time to heal. They appeared to have all of their questions answered to their satisfaction. They were recommended to cleanse the treatment site with dilute hydrogen peroxide and water (using 50:50 ratio). They were told how to apply the solution gently with a cotton ball twice daily. After cleaning, they were instructed to pat the area dry with a soft cloth and then apply a small amount of Silvadene 1% cream twice daily. They were told to return to the clinic in 7 days for re-evaluation. The patient understands to call with any questions, concerns or difficulties. They were informed of the potentital for infection and counseled on common signs and symptoms of infection including skin redness extending outside of the treatment area, enlargement or skin breakdown, significant warmth, pain, fever or chills or sweats. They voiced an understanding to contact us immediately if any of these symptoms develop. Their follow up appointment was scheduled. They were also instructed to follow-up with dermatology for skin cancer surveillance.
Date Of Fraction 5: 03/15/2024
Location Override (Location Will Render As Ema Body Location If Left Blank): left nasal dorsum
Date Of Fraction 14: 03/28/2024
Date Of Fraction 15: 03/29/2024
Lesion Regression?: 100
Date Of Fraction 6: 03/18/2024
Date Of Fraction 16: 04/01/2024
Date Of Fraction 7: 03/19/2024
Dimensions-X Axis In Cm: 0
Is Therapy Completed?: Yes
Clinical Recommendations: Skin recommendations for mild, moderate, brisk erythema or dry desquamation
Date Of Fraction 8: 03/20/2024
How Was The Treatment Tolerated?: very well
Total Rx Dosage: 5000
Date Of Fraction 17: 04/02/2024

## 2024-04-02 ENCOUNTER — APPOINTMENT (RX ONLY)
Dept: URBAN - METROPOLITAN AREA CLINIC 94 | Facility: CLINIC | Age: 74
Setting detail: DERMATOLOGY
End: 2024-04-02

## 2024-04-02 DIAGNOSIS — I87.2 VENOUS INSUFFICIENCY (CHRONIC) (PERIPHERAL): ICD-10-CM | Status: INADEQUATELY CONTROLLED

## 2024-04-02 PROCEDURE — ? COUNSELING

## 2024-04-02 PROCEDURE — ? PHOTO-DOCUMENTATION

## 2024-04-02 PROCEDURE — ? CEAP CLASSIFICATION

## 2024-04-02 PROCEDURE — ? VENOUS CLINICAL SEVERITY SCORE

## 2024-04-02 PROCEDURE — 99213 OFFICE O/P EST LOW 20 MIN: CPT

## 2024-04-02 PROCEDURE — ? COMPRESSION STOCKING FITTING

## 2024-04-02 ASSESSMENT — LOCATION ZONE DERM: LOCATION ZONE: LEG

## 2024-04-02 ASSESSMENT — LOCATION DETAILED DESCRIPTION DERM
LOCATION DETAILED: LEFT PROXIMAL PRETIBIAL REGION
LOCATION DETAILED: RIGHT DISTAL PRETIBIAL REGION

## 2024-04-02 ASSESSMENT — LOCATION SIMPLE DESCRIPTION DERM
LOCATION SIMPLE: LEFT PRETIBIAL REGION
LOCATION SIMPLE: RIGHT PRETIBIAL REGION

## 2024-04-02 NOTE — HPI: VEIN EVALUATION
[id number]
Which Leg Is Worse?: Left
Do You Have A Family History Of Bleeding Disorders?: no
How Severe Is/Are Your Symptoms?: moderate
Is This A New Presentation, Or A Follow-Up?: Vein Evaluation
Additional History: 73 year old\\nPMHx: stroke, PFO, ulcerative colitis\\nPSHX: repair PFO, prostatectomy\\n\\nPresents with bilateral lower extremity pain, discomfort, and restless legs primarily at the end of the day and wakes him up at night\\n\\nHe uses medical marjuana. Symptoms frequently wake hm at night. Symptoms significantly worse the last 3-4 months. Vitamin B6, magnesium, medical marjuana and sleeping with a pillow between his legs helped symptoms for several years, but no longer. He goes to the gym 3x weekly. Denies chest or abdomen pain. Admits to mild back pain. No numbness in the arms.
Family History Of Vein Disease (Include Family Member And Type Of Vein Disease):: Mother
Referred By:: In office advertising
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

## 2024-04-02 NOTE — PROCEDURE: COMPRESSION STOCKING FITTING
Toe Type: Open
Detail Level: Simple
Pantihose Type: Part A
Strength: 20-30 mmHg
Additional Instructions: He is given a hand written prescription for custom fit compression stockings to be obtained from a medical supply store as discussed.
Stocking Type: Stockings

## 2024-04-02 NOTE — PROCEDURE: VENOUS CLINICAL SEVERITY SCORE
Include Left Vcss In Note: Yes
Right Leg Varicose Veins: 1- Few, scattered: branch varicose veins
Left Leg Venous Edema: 0- None
Detail Level: Simple
Right Leg Pigmentation: 0- None or focal low intensity (tan)
Right Leg Pain: 2- Daily, moderate activity limitation, occasional analgesics
Right Leg Compression Therapy: 0- None or noncompliant

## 2024-04-16 ENCOUNTER — APPOINTMENT (RX ONLY)
Dept: URBAN - METROPOLITAN AREA CLINIC 102 | Facility: CLINIC | Age: 74
Setting detail: DERMATOLOGY
End: 2024-04-16

## 2024-04-22 ENCOUNTER — APPOINTMENT (OUTPATIENT)
Dept: ELECTROPHYSIOLOGY | Facility: CLINIC | Age: 74
End: 2024-04-22
Payer: COMMERCIAL

## 2024-04-22 ENCOUNTER — NON-APPOINTMENT (OUTPATIENT)
Age: 74
End: 2024-04-22

## 2024-04-22 PROCEDURE — 93298 REM INTERROG DEV EVAL SCRMS: CPT

## 2024-04-25 ENCOUNTER — APPOINTMENT (RX ONLY)
Dept: URBAN - METROPOLITAN AREA CLINIC 94 | Facility: CLINIC | Age: 74
Setting detail: DERMATOLOGY
End: 2024-04-25

## 2024-04-25 DIAGNOSIS — D18.0 HEMANGIOMA: ICD-10-CM

## 2024-04-25 DIAGNOSIS — L82.0 INFLAMED SEBORRHEIC KERATOSIS: ICD-10-CM

## 2024-04-25 DIAGNOSIS — Z85.820 PERSONAL HISTORY OF MALIGNANT MELANOMA OF SKIN: ICD-10-CM

## 2024-04-25 DIAGNOSIS — L85.3 XEROSIS CUTIS: ICD-10-CM

## 2024-04-25 DIAGNOSIS — L82.1 OTHER SEBORRHEIC KERATOSIS: ICD-10-CM

## 2024-04-25 PROBLEM — D18.01 HEMANGIOMA OF SKIN AND SUBCUTANEOUS TISSUE: Status: ACTIVE | Noted: 2024-04-25

## 2024-04-25 PROCEDURE — ? LIQUID NITROGEN

## 2024-04-25 PROCEDURE — ? COUNSELING

## 2024-04-25 PROCEDURE — 17110 DESTRUCTION B9 LES UP TO 14: CPT

## 2024-04-25 PROCEDURE — 99213 OFFICE O/P EST LOW 20 MIN: CPT | Mod: 25

## 2024-04-25 ASSESSMENT — LOCATION DETAILED DESCRIPTION DERM
LOCATION DETAILED: RIGHT DISTAL POSTERIOR UPPER ARM
LOCATION DETAILED: LEFT VENTRAL PROXIMAL FOREARM
LOCATION DETAILED: RIGHT VENTRAL DISTAL FOREARM
LOCATION DETAILED: LEFT PROXIMAL POSTERIOR UPPER ARM
LOCATION DETAILED: LEFT ANTERIOR PROXIMAL UPPER ARM
LOCATION DETAILED: LEFT LATERAL ABDOMEN
LOCATION DETAILED: LEFT ANTERIOR PROXIMAL THIGH
LOCATION DETAILED: LEFT PROXIMAL PRETIBIAL REGION
LOCATION DETAILED: RIGHT ANTERIOR PROXIMAL THIGH
LOCATION DETAILED: RIGHT PROXIMAL PRETIBIAL REGION
LOCATION DETAILED: RIGHT LATERAL INFERIOR CHEST
LOCATION DETAILED: LEFT SUPERIOR MEDIAL MIDBACK
LOCATION DETAILED: RIGHT ANTERIOR DISTAL UPPER ARM
LOCATION DETAILED: EPIGASTRIC SKIN
LOCATION DETAILED: RIGHT PROXIMAL POSTERIOR UPPER ARM

## 2024-04-25 ASSESSMENT — LOCATION SIMPLE DESCRIPTION DERM
LOCATION SIMPLE: RIGHT THIGH
LOCATION SIMPLE: ABDOMEN
LOCATION SIMPLE: RIGHT PRETIBIAL REGION
LOCATION SIMPLE: LEFT LOWER BACK
LOCATION SIMPLE: CHEST
LOCATION SIMPLE: LEFT THIGH
LOCATION SIMPLE: LEFT FOREARM
LOCATION SIMPLE: RIGHT UPPER ARM
LOCATION SIMPLE: RIGHT FOREARM
LOCATION SIMPLE: LEFT PRETIBIAL REGION
LOCATION SIMPLE: LEFT UPPER ARM

## 2024-04-25 ASSESSMENT — LOCATION ZONE DERM
LOCATION ZONE: TRUNK
LOCATION ZONE: LEG
LOCATION ZONE: ARM

## 2024-04-25 NOTE — PROCEDURE: LIQUID NITROGEN
Medical Necessity Clause: This procedure was medically necessary because the lesions that were treated were:
Add 52 Modifier (Optional): no
Show Spray Paint Technique Variable?: Yes
Consent: The patient's consent was obtained including but not limited to risks of crusting, scabbing, blistering, scarring, darker or lighter pigmentary change, recurrence, incomplete removal and infection.
Detail Level: Detailed
Medical Necessity Information: It is in your best interest to select a reason for this procedure from the list below. All of these items fulfill various CMS LCD requirements except the new and changing color options.
Post-Care Instructions: I reviewed with the patient in detail post-care instructions. Patient is to wear sunprotection, and avoid picking at any of the treated lesions. Pt may apply Vaseline to crusted or scabbing areas.
Spray Paint Text: The liquid nitrogen was applied to the skin utilizing a spray paint frosting technique.

## 2024-05-01 ENCOUNTER — APPOINTMENT (RX ONLY)
Dept: URBAN - METROPOLITAN AREA CLINIC 94 | Facility: CLINIC | Age: 74
Setting detail: DERMATOLOGY
End: 2024-05-01

## 2024-05-01 DIAGNOSIS — I87.2 VENOUS INSUFFICIENCY (CHRONIC) (PERIPHERAL): ICD-10-CM

## 2024-05-01 PROCEDURE — ? LOWER EXTREMITY DOPPLER US

## 2024-05-01 PROCEDURE — 93970 EXTREMITY STUDY: CPT

## 2024-05-01 NOTE — PROCEDURE: LOWER EXTREMITY DOPPLER US
Continue Conservative Therapy: Yes
Reflux Options: Use Range
See Attached Documentation Text: Please refer to the attached ultrasound documentation for complete details of the procedure and the venous findings.
Right Intraluminal Thrombus- Yes: The right deep veins were imaged from the level of the common femoral vein to the posterior tibial veins. There was evidence of intraluminal thrombus as noted above.
Recommend Cosmetic Sclerotherapy: No
Left Intraluminal Thrombus- Yes: The left deep veins were imaged from the level of the common femoral vein to the posterior tibial veins. There was evidence of intraluminal thrombus as noted above.
Use - 'see Attached Documentation' Verbiage?: Yes - Will Include Text Below and Will Remove Other Portions of the Note
Right Intraluminal Thrombus- No: The right deep veins were imaged from the level of the common femoral vein to the posterior tibial veins. All deep veins demonstrated compressibility without evidence of intraluminal thrombus.
Continue Conservative Therapy Text: Continue conservative treatment (such as compression stockings, OTC analgesics, and exercise) and consider intervention if no change or worsening symptoms to varicosities.
Comments: See attachment.
Left Intraluminal Thrombus- No: The left deep veins were imaged from the level of the common femoral vein to the posterior tibial veins. All deep veins demonstrated compressibility without evidence of intraluminal thrombus.
Detail Level: Detailed

## 2024-05-07 ENCOUNTER — APPOINTMENT (RX ONLY)
Dept: URBAN - METROPOLITAN AREA CLINIC 94 | Facility: CLINIC | Age: 74
Setting detail: DERMATOLOGY
End: 2024-05-07

## 2024-05-07 DIAGNOSIS — I87.2 VENOUS INSUFFICIENCY (CHRONIC) (PERIPHERAL): ICD-10-CM | Status: INADEQUATELY CONTROLLED

## 2024-05-07 PROCEDURE — ? OTHER

## 2024-05-07 PROCEDURE — 99213 OFFICE O/P EST LOW 20 MIN: CPT

## 2024-05-07 PROCEDURE — ? VEIN TREATMENT PLAN

## 2024-05-07 PROCEDURE — ? COUNSELING

## 2024-05-07 NOTE — PROCEDURE: VEIN TREATMENT PLAN
Symptom Statement (Will Not Render If Left Blank): The patient has signs and symptoms of chronic venous hypertension affecting daily function with US of the lower extremities demonstrating venous insufficiency. The patient has failed conservative treatment including avoiding prolonged standing, leg elevation, analgesis, exercise, weight management and graduated compression stockings (20-30mmHg or higher).
Closing Statement (Will Not Render If Left Blank): We discussed all treatment options. Risks and benefits of each procedure were discussed. These include but are not limited to: deep vein thrombosis, pulmonary embolism, paresthesia, phlebitis, infection, bleeding, and visible scarring. After the risks and benefits were reviewed with the patient and all of their questions were answered they decided to move forward with treatment. A patient education booklet was given and pre/post procedure instructions were reviewed with the patient.
Detail Level: Simple
Intro Statement (Will Not Render If Left Blank): Extensive discussion and education was undertaken during the office visit regarding pathophysiology, chronicity and long term prognosis of venous disease. We also discussed risk factors for venous hypertension, diagnostic testing and treatment. Our treatment discussion included conservative management and interventional procedure options with an in-depth explanation of the procedures, recommendations and expected follow-up. All questions were answered and no further questions were verbalized by the patient at this time.

## 2024-05-07 NOTE — PROCEDURE: OTHER
Note Text (......Xxx Chief Complaint.): This diagnosis correlates with the
Render Risk Assessment In Note?: no
Detail Level: Zone
Other (Free Text): - Advised to wear stockings on a regular basis throughout the day and especially during travel. Advised to obtain several pairs of stockings. He is given another rx for custom fit stockings. \\n\\n- Pt is requested to bring in his most recent echocardiogram results, as patient reports that he will be following up with his cardiologist Dr. Lozano, in [facility] \\n\\n- Discussed phlebectomy for bulging veins and cosmetic sclerotherapy as a future tx option if desired. \\n\\n- Pt is leaving for [place] today and will return in the winter. Will f/u upon return. If longer than 6 months ultrasound will need to be repeated.  Patient advised to follow up with a vascular surgeon in [place] lower extremity symptoms should worsen or flare up

## 2024-05-23 ENCOUNTER — NON-APPOINTMENT (OUTPATIENT)
Age: 74
End: 2024-05-23

## 2024-05-23 ENCOUNTER — APPOINTMENT (OUTPATIENT)
Dept: ELECTROPHYSIOLOGY | Facility: CLINIC | Age: 74
End: 2024-05-23
Payer: COMMERCIAL

## 2024-05-23 PROCEDURE — 93298 REM INTERROG DEV EVAL SCRMS: CPT

## 2024-05-30 ENCOUNTER — APPOINTMENT (OUTPATIENT)
Dept: CARDIOLOGY | Facility: CLINIC | Age: 74
End: 2024-05-30

## 2024-06-05 ENCOUNTER — APPOINTMENT (OUTPATIENT)
Dept: CARDIOLOGY | Facility: CLINIC | Age: 74
End: 2024-06-05
Payer: COMMERCIAL

## 2024-06-05 ENCOUNTER — NON-APPOINTMENT (OUTPATIENT)
Age: 74
End: 2024-06-05

## 2024-06-05 VITALS
HEART RATE: 57 BPM | DIASTOLIC BLOOD PRESSURE: 80 MMHG | OXYGEN SATURATION: 97 % | WEIGHT: 164 LBS | BODY MASS INDEX: 22.87 KG/M2 | SYSTOLIC BLOOD PRESSURE: 122 MMHG

## 2024-06-05 DIAGNOSIS — N18.30 CHRONIC KIDNEY DISEASE, STAGE 3 UNSPECIFIED: ICD-10-CM

## 2024-06-05 DIAGNOSIS — Z87.74 PERSONAL HISTORY OF (CORRECTED) CONGENITAL MALFORMATIONS OF HEART AND CIRCULATORY SYSTEM: ICD-10-CM

## 2024-06-05 DIAGNOSIS — Z00.00 ENCOUNTER FOR GENERAL ADULT MEDICAL EXAMINATION W/OUT ABNORMAL FINDINGS: ICD-10-CM

## 2024-06-05 DIAGNOSIS — I63.9 CEREBRAL INFARCTION, UNSPECIFIED: ICD-10-CM

## 2024-06-05 PROCEDURE — G2211 COMPLEX E/M VISIT ADD ON: CPT

## 2024-06-05 PROCEDURE — 99214 OFFICE O/P EST MOD 30 MIN: CPT

## 2024-06-05 PROCEDURE — 93000 ELECTROCARDIOGRAM COMPLETE: CPT

## 2024-06-06 LAB
ALBUMIN SERPL ELPH-MCNC: 4.6 G/DL
ALP BLD-CCNC: 92 U/L
ALT SERPL-CCNC: 20 U/L
ANION GAP SERPL CALC-SCNC: 11 MMOL/L
AST SERPL-CCNC: 21 U/L
BILIRUB SERPL-MCNC: 0.4 MG/DL
BUN SERPL-MCNC: 26 MG/DL
CALCIUM SERPL-MCNC: 9.5 MG/DL
CHLORIDE SERPL-SCNC: 106 MMOL/L
CHOLEST SERPL-MCNC: 155 MG/DL
CO2 SERPL-SCNC: 22 MMOL/L
CREAT SERPL-MCNC: 1.22 MG/DL
EGFR: 62 ML/MIN/1.73M2
ESTIMATED AVERAGE GLUCOSE: 117 MG/DL
GLUCOSE SERPL-MCNC: 79 MG/DL
HBA1C MFR BLD HPLC: 5.7 %
HCT VFR BLD CALC: 42.3 %
HDLC SERPL-MCNC: 55 MG/DL
HGB BLD-MCNC: 14.5 G/DL
LDLC SERPL CALC-MCNC: 87 MG/DL
MCHC RBC-ENTMCNC: 34.2 PG
MCHC RBC-ENTMCNC: 34.3 GM/DL
MCV RBC AUTO: 99.8 FL
NONHDLC SERPL-MCNC: 100 MG/DL
PLATELET # BLD AUTO: 180 K/UL
POTASSIUM SERPL-SCNC: 4.6 MMOL/L
PROT SERPL-MCNC: 6.8 G/DL
PSA FREE FLD-MCNC: NORMAL %
PSA FREE SERPL-MCNC: <0.01 NG/ML
PSA SERPL-MCNC: 0.31 NG/ML
RBC # BLD: 4.24 M/UL
RBC # FLD: 13.9 %
SODIUM SERPL-SCNC: 138 MMOL/L
T4 SERPL-MCNC: 7.3 UG/DL
TRIGL SERPL-MCNC: 63 MG/DL
TSH SERPL-ACNC: 3.78 UIU/ML
WBC # FLD AUTO: 6.04 K/UL

## 2024-06-07 ENCOUNTER — APPOINTMENT (OUTPATIENT)
Dept: CARDIOLOGY | Facility: CLINIC | Age: 74
End: 2024-06-07
Payer: COMMERCIAL

## 2024-06-07 VITALS
HEART RATE: 55 BPM | WEIGHT: 164 LBS | DIASTOLIC BLOOD PRESSURE: 70 MMHG | SYSTOLIC BLOOD PRESSURE: 118 MMHG | BODY MASS INDEX: 22.87 KG/M2 | OXYGEN SATURATION: 98 %

## 2024-06-07 DIAGNOSIS — Z13.6 ENCOUNTER FOR SCREENING FOR CARDIOVASCULAR DISORDERS: ICD-10-CM

## 2024-06-07 DIAGNOSIS — I87.2 VENOUS INSUFFICIENCY (CHRONIC) (PERIPHERAL): ICD-10-CM

## 2024-06-07 PROCEDURE — G2211 COMPLEX E/M VISIT ADD ON: CPT

## 2024-06-07 PROCEDURE — 99215 OFFICE O/P EST HI 40 MIN: CPT

## 2024-06-09 PROBLEM — Z13.6 ENCOUNTER FOR SCREENING FOR VASCULAR DISEASE: Status: ACTIVE | Noted: 2024-06-09

## 2024-06-09 NOTE — HISTORY OF PRESENT ILLNESS
[FreeTextEntry1] : Patient presents today for follow-up. He is in his usual state of health. His erectile dysfunction had resolved without intervention. More recently, however, he noted an elevated PSA and 4K score. He is now status post robotic prostatectomy. He has urinary continence but needs Viagra for sexual function. He has resumed exercise after prostatectomy. He is exercising at approximately 80% of his prior intensity. He favors bike riding for aerobic exercise.  Patient is up to date with colonoscopy, having had one in 2017. He follows with Dr. Dinero.  June 201 - Patient returns today in his usual state of health. This is his first visit since 2019. He spent June-September in [place], but he spent the rest of the pandemic in [place], [place]. He and his wife drove back and forth.  He continues to exercise regularly, including riding his bike and doing some resistance training at the gym (more so in [place] than in [place]). He also plays golf. He received both doses of Covid-19 vaccine in [place] (January and February).  June 2022 - Patient returns today for follow-up in his usual state of health. He spent November -May in [place], [place]. He continues to exercise regularly, including riding his bike and doing some resistance training at the gym. He also plays golf. He received both doses of Covid-19 vaccine in [place] (January and February 2021) and a Moderna booster sometime after that. He is considering a second booster.   August 2022 - Patient returns today for follow-up. He is having worsening difficulty with falling asleep. He  tries to maintain consistent sleep hygiene, including reading before going to bed. He attributes the worsening of the problem to a recent trip to [place] that threw him off. He had been using Benadryl and a medical marijuana gummy, and that was working, until it stopped. Several weeks ago, he started taking Ambien 5 mg PRN, and he has found that very effective. He continues to exercise regularly, usually in the morning. He does not have any caffeine intake.   9/20/2022. Dennis Cogan returns today for follow up after a recent CVA. He is accompanied by his wife, Patricia. On Friday afternoon he was taken to [facility] for complaints of right sided weakness and aphasia which resolved after 10-20 minutes. HIs speech returned to normal first, followed by his left leg and then his left arm and hand.  In the ER a Stroke Code was activated. CT of the head was without acute findings and bedside NIH was 0. Subsequent MRI of the brain revealed a small focus of diffusion restriction and increased T2 signal within the left motor cortex strongly suspicious for an area of acute or subacute infarction.  He was sent to the Clinical Decision Unit before being discharged with aspirin, clopidogrel and high intensity statin therapy. Today he reports feeling well in general, but is a bit shaken about the event. He was seen by Dr. Romero last night who performed ultrasounds of his head and neck which are reported as normal. On Thursday he will be evaluated by Sergey Michele MD for possible PFO closure.   10/18/2022. Dennis Cogan returns today for routine scheduled follow up. He is accompanied by his wife, Patricia. Since last visit he has been evaluated by Dr. Sergey Kauffman who performed a KIRTI in anticipation of PFO closure. He has also been evaluated by Dr. Uriel Knight who implanted a loop recorder on 9/29/2022 following a recent cryptogenic stroke. Today he is feeling well. He remains hesitant to resume his prior exercise in light of his recent events. He denies any chest discomfort, shortness of breath, palpitations, lightheadedness or syncope and reports no issues with bruising or bleeding on dual antiplatelet therapy.  January 2023 - Patient returns today for follow-up in his usual state of health. He remains on dual antiplatelet therapy after his PFO was closed on 11/16/2022 by Watson Kauffman MD. He continues to have upper GI symptoms for which he is planning to have EGD and colonoscopy in March 2023.  May 2023 - Patient returns today for follow-up in his usual state of health. Continues to play [employer] ball several times per week and he feels well while doing so. He reports some restless leg syndrome. EGD and colonoscopy scheduled with Dr. Brent Dinero on 6/2/2023.  July 2023 - Patient returns today for follow-up.  He reports that he recently travelled into the city by himself and was nervous being in the city alone again. He had transient palpitations while travelling.   PMD: BINH Valencia [phone] GI: Brent Dinero MD [phone] Urologist: Jere Duncna MD [phone] and Carlin Mckeon MD [phone]

## 2024-06-09 NOTE — CARDIOLOGY SUMMARY
[de-identified] : 4/28/2017, sinus bradycardia at 51 bpm, diffuse ST-T abnormality (nonspecific) \par  9/20/2022, sinus bradycardia at 55 bpm, normal axis, normal intervals and morphologies, normal R-wave progression.  [de-identified] : 5/23/2017, mild LV remodeling (increased wall thickness with normal LV mass), normal LV function, no pulmonary hypertension, normal LA size, mild mitral regurgitation LVEF 60-65%.\par  \par  9/17/2022. TTE.\par  Conclusions: \par  1. Normal left ventricular internal dimensions and wall\par  thicknesses.\par  2. Normal left ventricular systolic function. No segmental\par  wall motion abnormalities.\par  3. Normal right ventricular size and function.\par  4. Agitated saline injection and color flow Doppler\par  demonstrates evidence of a patent foramen ovale.\par  ------------------------------------------------------------------------\par  Confirmed on  9/17/2022 - 12:24:57 by Kenyon Sheikh M.D.\par  \par  PROCEDURE: Transesophageal (KIRTI) was performed.  Informed\par  consent was first obtained for KIRTI. The patient was sedated\par  - see anesthesia record.  The procedure was monitored with\par  automatic blood pressure monitoring, ECG tracings and pulse\par  oximetry. The transesophageal probe was placed in the\par  esophagus posterior to the heart without complications.  \par  INDICATION: Atrial septal defect (Q21.1), Other\par  cerebrovascular disease (I67.89)\par  ------------------------------------------------------------------------\par  Dimensions:    Normal Values:\par  LA:            2.0 - 4.0 cm\par  Ao:            2.0 - 3.8 cm\par  SEPTUM:        0.6 - 1.2 cm\par  PWT:           0.6 - 1.1 cm\par  LVIDd:         3.0 - 5.6 cm\par  LVIDs:         1.8 - 4.0 cm\par  EF (Carey Method): 70 %\par  ------------------------------------------------------------------------\par  Observations:\par  Mitral Valve: Normal mitral valve. Mild mitral\par  regurgitation. \par  Aortic Valve/Aorta: Normal trileaflet aortic valve. Minimal\par  aortic regurgitation.  Peak left ventricular outflow tract\par  gradient equals 6 mm Hg, mean gradient is equal to 3 mm Hg,\par  LVOT velocity time integral equals 27 cm.\par  Simple atheroma noted in aortic arch/descending aorta.\par  Left Atrium: Normal left atrium.   No left atrial or left\par  atrial appendage thrombus.\par  Left Ventricle: Normal left ventricular systolic function.\par  No segmental wall motion abnormalities. Normal left\par  ventricular internal dimensions and wall thicknesses.\par  Right Heart: Normal right atrium. Normal right ventricular\par  size and function. Normal tricuspid valve. Normal pulmonic\par  valve.\par  Pericardium/Pleura: Normal pericardium with no pericardial\par  effusion.\par  Hemodynamic: Estimated right atrial pressure is 8 mm Hg.\par  Agitated saline injection and color flow Doppler\par  demonstrates evidence of a patent foramen ovale.  PFO\par  liftoff height 0.32 cm.\par  ------------------------------------------------------------------------\par  Conclusions: \par  1. Normal left ventricular internal dimensions and wall\par  thicknesses.\par  2. Normal left ventricular systolic function. No segmental\par  wall motion abnormalities.\par  3. Normal right ventricular size and function.\par  4. Agitated saline injection and color flow Doppler\par  demonstrates evidence of a patent foramen ovale.  PFO\par  liftoff height 0.32 cm.\par  ------------------------------------------------------------------------\par  Confirmed on  10/11/2022 - 14:27:59 by Kenyon Sheikh M.D.\par  ARLENE\par  \par  5/11/2023 - interatrial closure device on septum, no flow seen, normal LV systolic function, LVEF 65-70%

## 2024-06-09 NOTE — DISCUSSION/SUMMARY
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   Continue low dose aspirin and high intensity statin therapy for patient with recent stroke.  Most recent LDL 59 mg/dL (January 2023). Goal LDL now < 70 mg/dL. Continue rosuvastatin. Continue ASA without interruption.   Follow up with Dr. Uriel Knight for continued monitoring of ILR.  He understands that should silent AF be detected, his management would require anticoagulation with NOAC. Most recent remote interrogation showed no events.   He has been encouraged that it is safe to resume his previous level of exercise.  Labs today.

## 2024-06-09 NOTE — REASON FOR VISIT
[FreeTextEntry3] : BINH Valencia [phone] [FreeTextEntry1] : 6/5/2024 Ry returns today for routine annual follow up. While in [place] he saw a vascular doctor regarding pains in his lower legs. He had some testing done and was advised to wear compression stockings. Although interventions were discussed, he deferred and is now planning to see Alton Castellanos MD here on Friday. Otherwise he is feeling well and he has no specific complaints. He goes to the gym 3 times a week where he does resistance training and lifts weights. He and his wife are also training for an upcoming bicycle tour where they plan to spend 6 days cycling from [place] to [place]. Since last visit he had a melanoma removed from the back of his left upper arm. He also had a basal cell carcinoma of the face which was treated with electronic beam therapy in lieu of Moh's procedure. There have been no interval changes to his medications and he takes all as directed except for on Sundays, where he takes a day off from all medications.

## 2024-06-26 ENCOUNTER — APPOINTMENT (OUTPATIENT)
Dept: ELECTROPHYSIOLOGY | Facility: CLINIC | Age: 74
End: 2024-06-26
Payer: COMMERCIAL

## 2024-06-26 ENCOUNTER — NON-APPOINTMENT (OUTPATIENT)
Age: 74
End: 2024-06-26

## 2024-06-26 PROCEDURE — 93298 REM INTERROG DEV EVAL SCRMS: CPT

## 2024-07-31 ENCOUNTER — APPOINTMENT (OUTPATIENT)
Dept: ELECTROPHYSIOLOGY | Facility: CLINIC | Age: 74
End: 2024-07-31
Payer: COMMERCIAL

## 2024-07-31 ENCOUNTER — NON-APPOINTMENT (OUTPATIENT)
Age: 74
End: 2024-07-31

## 2024-07-31 PROCEDURE — 93298 REM INTERROG DEV EVAL SCRMS: CPT

## 2024-09-04 ENCOUNTER — NON-APPOINTMENT (OUTPATIENT)
Age: 74
End: 2024-09-04

## 2024-09-04 ENCOUNTER — APPOINTMENT (OUTPATIENT)
Dept: ELECTROPHYSIOLOGY | Facility: CLINIC | Age: 74
End: 2024-09-04
Payer: COMMERCIAL

## 2024-09-04 PROCEDURE — 93298 REM INTERROG DEV EVAL SCRMS: CPT

## 2024-10-07 ENCOUNTER — NON-APPOINTMENT (OUTPATIENT)
Age: 74
End: 2024-10-07

## 2024-10-07 ENCOUNTER — APPOINTMENT (OUTPATIENT)
Dept: ELECTROPHYSIOLOGY | Facility: CLINIC | Age: 74
End: 2024-10-07
Payer: COMMERCIAL

## 2024-10-07 PROCEDURE — 93298 REM INTERROG DEV EVAL SCRMS: CPT

## 2024-11-08 ENCOUNTER — NON-APPOINTMENT (OUTPATIENT)
Age: 74
End: 2024-11-08

## 2024-11-08 ENCOUNTER — APPOINTMENT (OUTPATIENT)
Dept: ELECTROPHYSIOLOGY | Facility: CLINIC | Age: 74
End: 2024-11-08

## 2024-11-08 PROCEDURE — 93298 REM INTERROG DEV EVAL SCRMS: CPT

## 2024-11-13 ENCOUNTER — APPOINTMENT (RX ONLY)
Dept: URBAN - METROPOLITAN AREA CLINIC 102 | Facility: CLINIC | Age: 74
Setting detail: DERMATOLOGY
End: 2024-11-13

## 2024-11-13 DIAGNOSIS — D49.2 NEOPLASM OF UNSPECIFIED BEHAVIOR OF BONE, SOFT TISSUE, AND SKIN: ICD-10-CM

## 2024-11-13 DIAGNOSIS — L57.0 ACTINIC KERATOSIS: ICD-10-CM

## 2024-11-13 PROCEDURE — ? COUNSELING

## 2024-11-13 PROCEDURE — 17000 DESTRUCT PREMALG LESION: CPT

## 2024-11-13 PROCEDURE — 99212 OFFICE O/P EST SF 10 MIN: CPT | Mod: 25

## 2024-11-13 PROCEDURE — ? LIQUID NITROGEN

## 2024-11-13 PROCEDURE — 17003 DESTRUCT PREMALG LES 2-14: CPT

## 2024-11-13 ASSESSMENT — LOCATION DETAILED DESCRIPTION DERM
LOCATION DETAILED: LEFT CENTRAL PARIETAL SCALP
LOCATION DETAILED: LEFT SUPERIOR PARIETAL SCALP
LOCATION DETAILED: RIGHT SUPERIOR PARIETAL SCALP
LOCATION DETAILED: RIGHT INFERIOR LATERAL FOREHEAD
LOCATION DETAILED: RIGHT SUPERIOR MEDIAL FOREHEAD

## 2024-11-13 ASSESSMENT — LOCATION ZONE DERM
LOCATION ZONE: SCALP
LOCATION ZONE: FACE

## 2024-11-13 ASSESSMENT — LOCATION SIMPLE DESCRIPTION DERM
LOCATION SIMPLE: RIGHT FOREHEAD
LOCATION SIMPLE: SCALP

## 2024-11-13 NOTE — PROCEDURE: COUNSELING
Detail Level: Simple
Patient Specific Counseling (Will Not Stick From Patient To Patient): Patient originally had biopsy of left superior occipital scalp that showed HAK, was then treated with LN2, recently biopsied again by [facility]. Awaiting results. 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 Instructed to continue applying Mupirocin bid

## 2024-11-13 NOTE — PROCEDURE: LIQUID NITROGEN
Duration Of Freeze Thaw-Cycle (Seconds): 0
Post-Care Instructions: Patient provided with verbal post-care instructions. Patient is to wear sun protection. Patient may apply Vaseline to crusted or scabbing areas as they heal.
Detail Level: Simple
Render Post-Care Instructions In Note?: no
Show Aperture Variable?: Yes
Consent: The patient's consent was obtained and risks, benefits, and alternatives were reviewed.

## 2024-12-05 ENCOUNTER — APPOINTMENT (OUTPATIENT)
Dept: CARDIOLOGY | Facility: CLINIC | Age: 74
End: 2024-12-05

## 2024-12-05 VITALS
OXYGEN SATURATION: 97 % | DIASTOLIC BLOOD PRESSURE: 72 MMHG | HEART RATE: 51 BPM | SYSTOLIC BLOOD PRESSURE: 111 MMHG | BODY MASS INDEX: 22.73 KG/M2 | WEIGHT: 163 LBS

## 2024-12-05 DIAGNOSIS — I63.9 CEREBRAL INFARCTION, UNSPECIFIED: ICD-10-CM

## 2024-12-05 PROCEDURE — G2211 COMPLEX E/M VISIT ADD ON: CPT | Mod: NC

## 2024-12-05 PROCEDURE — 99214 OFFICE O/P EST MOD 30 MIN: CPT

## 2024-12-11 ENCOUNTER — NON-APPOINTMENT (OUTPATIENT)
Age: 74
End: 2024-12-11

## 2024-12-11 ENCOUNTER — APPOINTMENT (OUTPATIENT)
Dept: ELECTROPHYSIOLOGY | Facility: CLINIC | Age: 74
End: 2024-12-11
Payer: COMMERCIAL

## 2024-12-11 PROCEDURE — 93298 REM INTERROG DEV EVAL SCRMS: CPT

## 2024-12-12 ENCOUNTER — APPOINTMENT (OUTPATIENT)
Dept: CARDIOLOGY | Facility: CLINIC | Age: 74
End: 2024-12-12

## 2025-01-03 ENCOUNTER — NON-APPOINTMENT (OUTPATIENT)
Age: 75
End: 2025-01-03

## 2025-01-03 ENCOUNTER — APPOINTMENT (OUTPATIENT)
Dept: CARDIOLOGY | Facility: CLINIC | Age: 75
End: 2025-01-03
Payer: COMMERCIAL

## 2025-01-03 VITALS
DIASTOLIC BLOOD PRESSURE: 68 MMHG | OXYGEN SATURATION: 96 % | BODY MASS INDEX: 23.24 KG/M2 | HEIGHT: 71 IN | HEART RATE: 56 BPM | SYSTOLIC BLOOD PRESSURE: 104 MMHG | WEIGHT: 166 LBS

## 2025-01-03 DIAGNOSIS — Q21.12 PATENT FORAMEN OVALE: ICD-10-CM

## 2025-01-03 DIAGNOSIS — I87.2 VENOUS INSUFFICIENCY (CHRONIC) (PERIPHERAL): ICD-10-CM

## 2025-01-03 DIAGNOSIS — R94.31 ABNORMAL ELECTROCARDIOGRAM [ECG] [EKG]: ICD-10-CM

## 2025-01-03 DIAGNOSIS — G45.9 TRANSIENT CEREBRAL ISCHEMIC ATTACK, UNSPECIFIED: ICD-10-CM

## 2025-01-03 DIAGNOSIS — Z13.6 ENCOUNTER FOR SCREENING FOR CARDIOVASCULAR DISORDERS: ICD-10-CM

## 2025-01-03 DIAGNOSIS — Z87.74 PERSONAL HISTORY OF (CORRECTED) CONGENITAL MALFORMATIONS OF HEART AND CIRCULATORY SYSTEM: ICD-10-CM

## 2025-01-03 PROCEDURE — 93000 ELECTROCARDIOGRAM COMPLETE: CPT

## 2025-01-03 PROCEDURE — 99214 OFFICE O/P EST MOD 30 MIN: CPT | Mod: 25

## 2025-01-04 PROBLEM — G45.9 TRANSIENT CEREBRAL ISCHEMIC ATTACK: Status: ACTIVE | Noted: 2022-09-20

## 2025-01-09 NOTE — PACU DISCHARGE NOTE - AIRWAY PATENCY:
Satisfactory Detail Level: Detailed Was A Bandage Applied: Yes Punch Size In Mm: 3 Size Of Lesion In Cm (Optional): 0 Depth Of Punch Biopsy: dermis Biopsy Type: H and E Anesthesia Type: 1% lidocaine with epinephrine Anesthesia Volume In Cc: 0.5 Hemostasis: None Epidermal Sutures: none, closed by secondary intention Wound Care: Petrolatum Dressing: bandage Suture Removal: 14 days Patient Will Remove Sutures At Home?: No Lab: [id number] Lab Facility:  Consent: Written consent was obtained and risks were reviewed including but not limited to scarring, infection, bleeding, scabbing, incomplete removal, nerve damage and allergy to anesthesia. Post-Care Instructions: I reviewed with the patient in detail post-care instructions. Patient is to keep the biopsy site dry overnight, and then apply bacitracin twice daily until healed. Patient may apply hydrogen peroxide soaks to remove any crusting. Home Suture Removal Text: Patient was provided a home suture removal kit and will remove their sutures at home.  If they have any questions or difficulties they will call the office. Notification Instructions: Patient will be notified of biopsy results. However, patient instructed to call the office if not contacted within 2 weeks. Billing Type: Third-Party Bill Information: Selecting Yes will display possible errors in your note based on the variables you have selected. This validation is only offered as a suggestion for you. PLEASE NOTE THAT THE VALIDATION TEXT WILL BE REMOVED WHEN YOU FINALIZE YOUR NOTE. IF YOU WANT TO FAX A PRELIMINARY NOTE YOU WILL NEED TO TOGGLE THIS TO 'NO' IF YOU DO NOT WANT IT IN YOUR FAXED NOTE.

## 2025-01-15 ENCOUNTER — APPOINTMENT (OUTPATIENT)
Dept: ELECTROPHYSIOLOGY | Facility: CLINIC | Age: 75
End: 2025-01-15
Payer: COMMERCIAL

## 2025-01-15 ENCOUNTER — NON-APPOINTMENT (OUTPATIENT)
Age: 75
End: 2025-01-15

## 2025-01-15 PROCEDURE — 93298 REM INTERROG DEV EVAL SCRMS: CPT

## 2025-02-07 ENCOUNTER — RX RENEWAL (OUTPATIENT)
Age: 75
End: 2025-02-07

## 2025-02-19 ENCOUNTER — NON-APPOINTMENT (OUTPATIENT)
Age: 75
End: 2025-02-19

## 2025-02-19 ENCOUNTER — APPOINTMENT (OUTPATIENT)
Dept: ELECTROPHYSIOLOGY | Facility: CLINIC | Age: 75
End: 2025-02-19
Payer: COMMERCIAL

## 2025-02-19 PROCEDURE — 93298 REM INTERROG DEV EVAL SCRMS: CPT

## 2025-02-27 ENCOUNTER — TRANSCRIPTION ENCOUNTER (OUTPATIENT)
Age: 75
End: 2025-02-27

## 2025-02-28 ENCOUNTER — TRANSCRIPTION ENCOUNTER (OUTPATIENT)
Age: 75
End: 2025-02-28

## 2025-03-03 ENCOUNTER — RX ONLY (RX ONLY)
Age: 75
End: 2025-03-03

## 2025-03-03 RX ORDER — KETOCONAZOLE 20 MG/G
CREAM TOPICAL BID
Qty: 30 | Refills: 5 | Status: ERX | COMMUNITY
Start: 2025-03-03

## 2025-03-07 ENCOUNTER — APPOINTMENT (OUTPATIENT)
Facility: CLINIC | Age: 75
End: 2025-03-07
Payer: COMMERCIAL

## 2025-03-07 ENCOUNTER — NON-APPOINTMENT (OUTPATIENT)
Age: 75
End: 2025-03-07

## 2025-03-07 VITALS
WEIGHT: 164 LBS | SYSTOLIC BLOOD PRESSURE: 106 MMHG | HEIGHT: 70 IN | DIASTOLIC BLOOD PRESSURE: 70 MMHG | BODY MASS INDEX: 23.48 KG/M2

## 2025-03-07 DIAGNOSIS — C43.4 MALIGNANT MELANOMA OF SCALP AND NECK: ICD-10-CM

## 2025-03-07 PROCEDURE — 99205 OFFICE O/P NEW HI 60 MIN: CPT

## 2025-03-07 RX ADMIN — DIAZEPAM 0 MG: 5 TABLET ORAL at 00:00

## 2025-03-13 ENCOUNTER — APPOINTMENT (OUTPATIENT)
Dept: MRI IMAGING | Facility: CLINIC | Age: 75
End: 2025-03-13
Payer: COMMERCIAL

## 2025-03-13 ENCOUNTER — OUTPATIENT (OUTPATIENT)
Dept: OUTPATIENT SERVICES | Facility: HOSPITAL | Age: 75
LOS: 1 days | End: 2025-03-13
Payer: COMMERCIAL

## 2025-03-13 DIAGNOSIS — C43.4 MALIGNANT MELANOMA OF SCALP AND NECK: ICD-10-CM

## 2025-03-13 DIAGNOSIS — Z90.89 ACQUIRED ABSENCE OF OTHER ORGANS: Chronic | ICD-10-CM

## 2025-03-13 PROCEDURE — 70543 MRI ORBT/FAC/NCK W/O &W/DYE: CPT

## 2025-03-13 PROCEDURE — A9585: CPT

## 2025-03-13 PROCEDURE — 70543 MRI ORBT/FAC/NCK W/O &W/DYE: CPT | Mod: 26

## 2025-03-17 ENCOUNTER — OUTPATIENT (OUTPATIENT)
Dept: OUTPATIENT SERVICES | Facility: HOSPITAL | Age: 75
LOS: 1 days | End: 2025-03-17
Payer: COMMERCIAL

## 2025-03-17 VITALS
RESPIRATION RATE: 16 BRPM | SYSTOLIC BLOOD PRESSURE: 111 MMHG | HEART RATE: 59 BPM | HEIGHT: 70 IN | WEIGHT: 162.7 LBS | DIASTOLIC BLOOD PRESSURE: 65 MMHG | OXYGEN SATURATION: 97 % | TEMPERATURE: 99 F

## 2025-03-17 DIAGNOSIS — C43.4 MALIGNANT MELANOMA OF SCALP AND NECK: ICD-10-CM

## 2025-03-17 DIAGNOSIS — Z87.74 PERSONAL HISTORY OF (CORRECTED) CONGENITAL MALFORMATIONS OF HEART AND CIRCULATORY SYSTEM: Chronic | ICD-10-CM

## 2025-03-17 DIAGNOSIS — Z98.890 OTHER SPECIFIED POSTPROCEDURAL STATES: Chronic | ICD-10-CM

## 2025-03-17 DIAGNOSIS — Z90.79 ACQUIRED ABSENCE OF OTHER GENITAL ORGAN(S): Chronic | ICD-10-CM

## 2025-03-17 DIAGNOSIS — Z90.89 ACQUIRED ABSENCE OF OTHER ORGANS: Chronic | ICD-10-CM

## 2025-03-17 DIAGNOSIS — Z01.818 ENCOUNTER FOR OTHER PREPROCEDURAL EXAMINATION: ICD-10-CM

## 2025-03-17 LAB
ALP SERPL-CCNC: 103 U/L — SIGNIFICANT CHANGE UP (ref 40–120)
ALT FLD-CCNC: 28 U/L — SIGNIFICANT CHANGE UP (ref 10–45)
ANION GAP SERPL CALC-SCNC: 9 MMOL/L — SIGNIFICANT CHANGE UP (ref 5–17)
AST SERPL-CCNC: 22 U/L — SIGNIFICANT CHANGE UP (ref 10–40)
BILIRUB SERPL-MCNC: 0.5 MG/DL — SIGNIFICANT CHANGE UP (ref 0.2–1.2)
BLD GP AB SCN SERPL QL: SIGNIFICANT CHANGE UP
BUN SERPL-MCNC: 26 MG/DL — HIGH (ref 7–23)
CALCIUM SERPL-MCNC: 8.9 MG/DL — SIGNIFICANT CHANGE UP (ref 8.4–10.5)
CHLORIDE SERPL-SCNC: 105 MMOL/L — SIGNIFICANT CHANGE UP (ref 96–108)
CO2 SERPL-SCNC: 24 MMOL/L — SIGNIFICANT CHANGE UP (ref 22–31)
CREAT SERPL-MCNC: 1.09 MG/DL — SIGNIFICANT CHANGE UP (ref 0.5–1.3)
EGFR: 71 ML/MIN/1.73M2 — SIGNIFICANT CHANGE UP
EGFR: 71 ML/MIN/1.73M2 — SIGNIFICANT CHANGE UP
GLUCOSE SERPL-MCNC: 76 MG/DL — SIGNIFICANT CHANGE UP (ref 70–99)
HCT VFR BLD CALC: 44.9 % — SIGNIFICANT CHANGE UP (ref 39–50)
HGB BLD-MCNC: 15 G/DL — SIGNIFICANT CHANGE UP (ref 13–17)
MCHC RBC-ENTMCNC: 33.3 PG — SIGNIFICANT CHANGE UP (ref 27–34)
MCHC RBC-ENTMCNC: 33.4 G/DL — SIGNIFICANT CHANGE UP (ref 32–36)
MCV RBC AUTO: 99.6 FL — SIGNIFICANT CHANGE UP (ref 80–100)
NRBC BLD AUTO-RTO: 0 /100 WBCS — SIGNIFICANT CHANGE UP (ref 0–0)
POTASSIUM SERPL-MCNC: 4.6 MMOL/L — SIGNIFICANT CHANGE UP (ref 3.5–5.3)
POTASSIUM SERPL-SCNC: 4.6 MMOL/L — SIGNIFICANT CHANGE UP (ref 3.5–5.3)
PROT SERPL-MCNC: 7.2 G/DL — SIGNIFICANT CHANGE UP (ref 6–8.3)
RBC # BLD: 4.51 M/UL — SIGNIFICANT CHANGE UP (ref 4.2–5.8)
RBC # FLD: 13 % — SIGNIFICANT CHANGE UP (ref 10.3–14.5)
SODIUM SERPL-SCNC: 138 MMOL/L — SIGNIFICANT CHANGE UP (ref 135–145)
WBC # BLD: 5.43 K/UL — SIGNIFICANT CHANGE UP (ref 3.8–10.5)
WBC # FLD AUTO: 5.43 K/UL — SIGNIFICANT CHANGE UP (ref 3.8–10.5)

## 2025-03-17 PROCEDURE — 80053 COMPREHEN METABOLIC PANEL: CPT

## 2025-03-17 PROCEDURE — 85027 COMPLETE CBC AUTOMATED: CPT

## 2025-03-17 PROCEDURE — G0463: CPT

## 2025-03-17 PROCEDURE — 86900 BLOOD TYPING SEROLOGIC ABO: CPT

## 2025-03-17 PROCEDURE — 86850 RBC ANTIBODY SCREEN: CPT

## 2025-03-17 PROCEDURE — 86901 BLOOD TYPING SEROLOGIC RH(D): CPT

## 2025-03-17 PROCEDURE — 36415 COLL VENOUS BLD VENIPUNCTURE: CPT

## 2025-03-17 RX ORDER — SODIUM CHLORIDE 9 G/1000ML
1000 INJECTION, SOLUTION INTRAVENOUS
Refills: 0 | Status: DISCONTINUED | OUTPATIENT
Start: 2025-04-02 | End: 2025-04-16

## 2025-03-17 NOTE — H&P PST ADULT - HISTORY OF PRESENT ILLNESS
73 yo male presents s/p 2 prior surgeries for excision of scalp melanoma and now for a wider excision. Reports that the area is tender to touch. normal...

## 2025-03-17 NOTE — H&P PST ADULT - PROBLEM SELECTOR PLAN 1
wide excision scalp skin, resection outer calvarium of skull bone, local advancement flap, full thickness skin graft. medical clearance requested. instructed to stop omega 3 and MVI 1 week prior to surgery and check with cardiology as per holding aspirin. NPO after midnight

## 2025-03-17 NOTE — H&P PST ADULT - NSICDXPASTSURGICALHX_GEN_ALL_CORE_FT
PAST SURGICAL HISTORY:  History of bilateral inguinal hernia repair     History of Mohs surgery for squamous cell carcinoma of skin     History of prostatectomy     History of surgical closure of patent foramen ovale (PFO)     S/P tonsillectomy age 5    Status post Mohs surgery for basal cell carcinoma

## 2025-03-17 NOTE — H&P PST ADULT - NSANTHOSAYNRD_GEN_A_CORE
neck 15.5 inches/No. NILDA screening performed.  STOP BANG Legend: 0-2 = LOW Risk; 3-4 = INTERMEDIATE Risk; 5-8 = HIGH Risk

## 2025-03-17 NOTE — H&P PST ADULT - RESPIRATORY
normal/clear to auscultation bilaterally/no wheezes/no rales/no rhonchi/no subcutaneous emphysema/airway patent/breath sounds equal/good air movement/respirations non-labored

## 2025-03-17 NOTE — H&P PST ADULT - NSICDXFAMILYHX_GEN_ALL_CORE_FT
FAMILY HISTORY:  Father  Still living? No  Family history of CHF (congestive heart failure), Age at diagnosis: Age Unknown  Family history of coronary artery disease, Age at diagnosis: Age Unknown    Aunt  Still living? No  Family history of diabetes mellitus, Age at diagnosis: Age Unknown  Family history of lung cancer, Age at diagnosis: Age Unknown  Family history of multiple myeloma, Age at diagnosis: Age Unknown    Uncle  Still living? No  Family history of coronary artery disease, Age at diagnosis: Age Unknown  Family history of coronary artery disease, Age at diagnosis: Age Unknown  Family history of coronary artery disease, Age at diagnosis: Age Unknown

## 2025-03-17 NOTE — H&P PST ADULT - NSICDXPASTMEDICALHX_GEN_ALL_CORE_FT
PAST MEDICAL HISTORY:  Basal cell carcinoma     Chronic venous insufficiency     Constipation     COVID-19 vaccine series completed     History of COVID-19     History of stroke     Hyperlipidemia been on medication for >5 years    Hypothyroidism     IBS (irritable bowel syndrome)     Insomnia     Malignant neoplasm of prostate     Melanoma of scalp     Osteoarthritis     Seasonal allergies     Squamous cell skin cancer     Stage 3 chronic kidney disease     Thyroid nodule

## 2025-03-21 ENCOUNTER — APPOINTMENT (OUTPATIENT)
Facility: CLINIC | Age: 75
End: 2025-03-21
Payer: COMMERCIAL

## 2025-03-21 VITALS — DIASTOLIC BLOOD PRESSURE: 67 MMHG | HEART RATE: 55 BPM | SYSTOLIC BLOOD PRESSURE: 113 MMHG

## 2025-03-21 PROCEDURE — 99215 OFFICE O/P EST HI 40 MIN: CPT

## 2025-03-25 ENCOUNTER — NON-APPOINTMENT (OUTPATIENT)
Age: 75
End: 2025-03-25

## 2025-03-25 ENCOUNTER — APPOINTMENT (OUTPATIENT)
Dept: ELECTROPHYSIOLOGY | Facility: CLINIC | Age: 75
End: 2025-03-25
Payer: COMMERCIAL

## 2025-03-25 PROBLEM — I87.2 VENOUS INSUFFICIENCY (CHRONIC) (PERIPHERAL): Chronic | Status: ACTIVE | Noted: 2025-03-17

## 2025-03-25 PROBLEM — C43.4 MALIGNANT MELANOMA OF SCALP AND NECK: Chronic | Status: ACTIVE | Noted: 2025-03-17

## 2025-03-25 PROBLEM — K59.00 CONSTIPATION, UNSPECIFIED: Chronic | Status: ACTIVE | Noted: 2025-03-17

## 2025-03-25 PROBLEM — G47.00 INSOMNIA, UNSPECIFIED: Chronic | Status: ACTIVE | Noted: 2025-03-17

## 2025-03-25 PROBLEM — C44.92 SQUAMOUS CELL CARCINOMA OF SKIN, UNSPECIFIED: Chronic | Status: ACTIVE | Noted: 2025-03-17

## 2025-03-25 PROBLEM — C44.91 BASAL CELL CARCINOMA OF SKIN, UNSPECIFIED: Chronic | Status: ACTIVE | Noted: 2025-03-17

## 2025-03-25 PROBLEM — E04.1 NONTOXIC SINGLE THYROID NODULE: Chronic | Status: ACTIVE | Noted: 2025-03-17

## 2025-03-25 PROBLEM — Z92.29 PERSONAL HISTORY OF OTHER DRUG THERAPY: Chronic | Status: ACTIVE | Noted: 2025-03-17

## 2025-03-25 PROBLEM — N18.30 CHRONIC KIDNEY DISEASE, STAGE 3 UNSPECIFIED: Chronic | Status: ACTIVE | Noted: 2025-03-17

## 2025-03-25 PROBLEM — J30.2 OTHER SEASONAL ALLERGIC RHINITIS: Chronic | Status: ACTIVE | Noted: 2025-03-17

## 2025-03-25 PROBLEM — M19.90 UNSPECIFIED OSTEOARTHRITIS, UNSPECIFIED SITE: Chronic | Status: ACTIVE | Noted: 2025-03-17

## 2025-03-25 PROBLEM — Z86.16 PERSONAL HISTORY OF COVID-19: Chronic | Status: ACTIVE | Noted: 2025-03-17

## 2025-03-25 PROCEDURE — 93298 REM INTERROG DEV EVAL SCRMS: CPT

## 2025-04-01 RX ORDER — ACETAMINOPHEN AND CODEINE PHOSPHATE 300; 30 MG/1; MG/1
300-30 TABLET ORAL
Qty: 12 | Refills: 0 | Status: ACTIVE | COMMUNITY
Start: 2025-04-01 | End: 1900-01-01

## 2025-04-01 RX ORDER — OMEGA-3-ACID ETHYL ESTERS CAPSULES 1 G/1
1 CAPSULE, LIQUID FILLED ORAL
Refills: 0 | DISCHARGE

## 2025-04-01 RX ORDER — B1/B2/B3/B5/B6/B12/VIT C/FOLIC 500-0.5 MG
1 TABLET ORAL
Refills: 0 | DISCHARGE

## 2025-04-01 RX ORDER — CEPHALEXIN 250 MG/1
250 TABLET ORAL
Qty: 40 | Refills: 0 | Status: ACTIVE | COMMUNITY
Start: 2025-04-01 | End: 1900-01-01

## 2025-04-01 NOTE — ASU PATIENT PROFILE, ADULT - NSICDXPASTSURGICALHX_GEN_ALL_CORE_FT
PAST SURGICAL HISTORY:  History of bilateral inguinal hernia repair     History of Mohs surgery for squamous cell carcinoma of skin     History of prostatectomy     History of surgical closure of patent foramen ovale (PFO)     S/P tonsillectomy age 5    Status post Mohs surgery for basal cell carcinoma      PAST SURGICAL HISTORY:  History of bilateral inguinal hernia repair mesh    History of Mohs surgery for squamous cell carcinoma of skin     History of prostatectomy     History of surgical closure of patent foramen ovale (PFO) s/p stroke    S/P tonsillectomy age 5    Status post Mohs surgery for basal cell carcinoma

## 2025-04-01 NOTE — ASU PATIENT PROFILE, ADULT - FALL HARM RISK - UNIVERSAL INTERVENTIONS
Bed in lowest position, wheels locked, appropriate side rails in place/Call bell, personal items and telephone in reach/Instruct patient to call for assistance before getting out of bed or chair/Non-slip footwear when patient is out of bed/[place] to call system/Physically safe environment - no spills, clutter or unnecessary equipment/Purposeful Proactive Rounding/Room/bathroom lighting operational, light cord in reach

## 2025-04-01 NOTE — ASU PATIENT PROFILE, ADULT - NSICDXPASTMEDICALHX_GEN_ALL_CORE_FT
PAST MEDICAL HISTORY:  Basal cell carcinoma     Chronic venous insufficiency     Constipation     COVID-19 vaccine series completed     History of COVID-19     History of stroke     Hyperlipidemia been on medication for >5 years    Hypothyroidism     IBS (irritable bowel syndrome)     Insomnia     Malignant neoplasm of prostate     Melanoma of scalp     Osteoarthritis     Seasonal allergies     Squamous cell skin cancer     Stage 3 chronic kidney disease     Thyroid nodule      PAST MEDICAL HISTORY:  Basal cell carcinoma     Chronic venous insufficiency     Constipation     COVID-19 vaccine series completed     History of COVID-19     History of stroke no residual    Hyperlipidemia been on medication for >5 years    Hypothyroidism     IBS (irritable bowel syndrome)     Insomnia     Malignant neoplasm of prostate     Melanoma of scalp     Osteoarthritis     Seasonal allergies     Squamous cell skin cancer     Stage 3 chronic kidney disease     Thyroid nodule

## 2025-04-02 ENCOUNTER — TRANSCRIPTION ENCOUNTER (OUTPATIENT)
Age: 75
End: 2025-04-02

## 2025-04-02 ENCOUNTER — APPOINTMENT (OUTPATIENT)
Facility: HOSPITAL | Age: 75
End: 2025-04-02

## 2025-04-02 ENCOUNTER — OUTPATIENT (OUTPATIENT)
Dept: OUTPATIENT SERVICES | Facility: HOSPITAL | Age: 75
LOS: 1 days | End: 2025-04-02
Payer: COMMERCIAL

## 2025-04-02 VITALS
SYSTOLIC BLOOD PRESSURE: 143 MMHG | OXYGEN SATURATION: 97 % | RESPIRATION RATE: 16 BRPM | DIASTOLIC BLOOD PRESSURE: 77 MMHG | HEART RATE: 61 BPM

## 2025-04-02 VITALS
HEART RATE: 51 BPM | WEIGHT: 162.7 LBS | OXYGEN SATURATION: 97 % | RESPIRATION RATE: 16 BRPM | SYSTOLIC BLOOD PRESSURE: 124 MMHG | TEMPERATURE: 98 F | HEIGHT: 70 IN | DIASTOLIC BLOOD PRESSURE: 77 MMHG

## 2025-04-02 DIAGNOSIS — Z98.890 OTHER SPECIFIED POSTPROCEDURAL STATES: Chronic | ICD-10-CM

## 2025-04-02 DIAGNOSIS — C43.4 MALIGNANT MELANOMA OF SCALP AND NECK: ICD-10-CM

## 2025-04-02 DIAGNOSIS — Z87.74 PERSONAL HISTORY OF (CORRECTED) CONGENITAL MALFORMATIONS OF HEART AND CIRCULATORY SYSTEM: Chronic | ICD-10-CM

## 2025-04-02 DIAGNOSIS — Z90.79 ACQUIRED ABSENCE OF OTHER GENITAL ORGAN(S): Chronic | ICD-10-CM

## 2025-04-02 DIAGNOSIS — Z90.89 ACQUIRED ABSENCE OF OTHER ORGANS: Chronic | ICD-10-CM

## 2025-04-02 LAB — ABO RH CONFIRMATION: SIGNIFICANT CHANGE UP

## 2025-04-02 PROCEDURE — 14020 TIS TRNFR S/A/L 10 SQ CM/<: CPT

## 2025-04-02 PROCEDURE — C9399: CPT

## 2025-04-02 PROCEDURE — 88342 IMHCHEM/IMCYTCHM 1ST ANTB: CPT | Mod: 26

## 2025-04-02 PROCEDURE — 15220 FTH/GFT FR S/A/L 20 SQ CM/<: CPT

## 2025-04-02 PROCEDURE — 88305 TISSUE EXAM BY PATHOLOGIST: CPT

## 2025-04-02 PROCEDURE — 14021 TIS TRNFR S/A/L 10.1-30 SQCM: CPT

## 2025-04-02 PROCEDURE — 61500 CRNEC EXC TUM/BONE LES SKULL: CPT

## 2025-04-02 PROCEDURE — 88341 IMHCHEM/IMCYTCHM EA ADD ANTB: CPT

## 2025-04-02 PROCEDURE — 88341 IMHCHEM/IMCYTCHM EA ADD ANTB: CPT | Mod: 26

## 2025-04-02 PROCEDURE — 88342 IMHCHEM/IMCYTCHM 1ST ANTB: CPT

## 2025-04-02 PROCEDURE — 36415 COLL VENOUS BLD VENIPUNCTURE: CPT

## 2025-04-02 PROCEDURE — 21016 RESECT FACE/SCALP TUM 2 CM/>: CPT | Mod: 59

## 2025-04-02 PROCEDURE — 88305 TISSUE EXAM BY PATHOLOGIST: CPT | Mod: 26

## 2025-04-02 RX ORDER — ROSUVASTATIN CALCIUM 20 MG/1
1 TABLET, FILM COATED ORAL
Refills: 0 | DISCHARGE

## 2025-04-02 RX ORDER — OXYCODONE 5 MG/1
5 TABLET ORAL
Qty: 6 | Refills: 0 | Status: ACTIVE | COMMUNITY
Start: 2025-04-02 | End: 1900-01-01

## 2025-04-02 RX ORDER — OXYCODONE HYDROCHLORIDE 30 MG/1
5 TABLET ORAL EVERY 6 HOURS
Refills: 0 | Status: DISCONTINUED | OUTPATIENT
Start: 2025-04-02 | End: 2025-04-02

## 2025-04-02 RX ORDER — ONDANSETRON HCL/PF 4 MG/2 ML
4 VIAL (ML) INJECTION ONCE
Refills: 0 | Status: DISCONTINUED | OUTPATIENT
Start: 2025-04-02 | End: 2025-04-02

## 2025-04-02 RX ORDER — HYDROMORPHONE/SOD CHLOR,ISO/PF 2 MG/10 ML
0.5 SYRINGE (ML) INJECTION
Refills: 0 | Status: DISCONTINUED | OUTPATIENT
Start: 2025-04-02 | End: 2025-04-02

## 2025-04-02 RX ORDER — CALCIUM CARBONATE 750 MG/1
2 TABLET ORAL ONCE
Refills: 0 | Status: COMPLETED | OUTPATIENT
Start: 2025-04-02 | End: 2025-04-02

## 2025-04-02 RX ORDER — HYDROMORPHONE/SOD CHLOR,ISO/PF 2 MG/10 ML
1 SYRINGE (ML) INJECTION ONCE
Refills: 0 | Status: DISCONTINUED | OUTPATIENT
Start: 2025-04-02 | End: 2025-04-02

## 2025-04-02 RX ORDER — SODIUM CHLORIDE 9 G/1000ML
1000 INJECTION, SOLUTION INTRAVENOUS
Refills: 0 | Status: DISCONTINUED | OUTPATIENT
Start: 2025-04-02 | End: 2025-04-02

## 2025-04-02 RX ORDER — POLYETHYLENE GLYCOL 3350 17 G/17G
17 POWDER, FOR SOLUTION ORAL
Refills: 0 | DISCHARGE

## 2025-04-02 RX ADMIN — SODIUM CHLORIDE 50 MILLILITER(S): 9 INJECTION, SOLUTION INTRAVENOUS at 06:35

## 2025-04-02 RX ADMIN — Medication 0.5 MILLIGRAM(S): at 10:35

## 2025-04-02 RX ADMIN — CALCIUM CARBONATE 2 TABLET(S): 750 TABLET ORAL at 11:22

## 2025-04-02 RX ADMIN — Medication 1 MILLIGRAM(S): at 10:01

## 2025-04-02 RX ADMIN — Medication 0.5 MILLIGRAM(S): at 10:14

## 2025-04-02 RX ADMIN — Medication 1 MILLIGRAM(S): at 09:42

## 2025-04-02 NOTE — BRIEF OPERATIVE NOTE - NSICDXBRIEFPROCEDURE_GEN_ALL_CORE_FT
PROCEDURES:  Radical resection of malignant soft tissue tumor of scalp 02-Apr-2025 09:33:16  Amanda Payton  Application, graft, skin, full thickness, to scalp 02-Apr-2025 09:34:47  Amanda Payton  Debridement, wound, scalp, excisional, with bone debridement 02-Apr-2025 09:37:46  Amanda Payton  Craniectomy with excision of skull tumor 02-Apr-2025 09:39:32  Amanda Payton  Adjacent tissue transfer of scalp, 10 sq cm or less 02-Apr-2025 09:40:35  Amanda Payton

## 2025-04-02 NOTE — ASU DISCHARGE PLAN (ADULT/PEDIATRIC) - ASU DC SPECIAL INSTRUCTIONSFT
TAKE MEDICATIONS AS PRESCRIBED    YOU MAY SHOWER IN 48 HOURS    YOU MAY RESTART ASPIRIN TOMORROW    NO HEAVY LIFTING, BENDING OR STRAINING    APPLY BACITRACIN TWICE DAILY TO INCISIONS FOR TWO DAYS AFTER SURGERY    FOLLOW UP WITH DR. SMITH 4/11/25, PLEASE CALL THE OFFICE FOR AN APPOINTMENT TIME

## 2025-04-02 NOTE — ASU DISCHARGE PLAN (ADULT/PEDIATRIC) - NS MD DC FALL RISK RISK
For information on Fall & Injury Prevention, visit: [email or web address] OR  [email or web address] OR  https://www.cdc.gov/steadi/patient.html

## 2025-04-02 NOTE — ASU DISCHARGE PLAN (ADULT/PEDIATRIC) - CARE PROVIDER_API CALL
ANGLE SMITH  [address] [place] [address], [address]  Phone: [phone]  Fax: (   )    -  Follow Up Time:

## 2025-04-02 NOTE — ASU DISCHARGE PLAN (ADULT/PEDIATRIC) - FINANCIAL ASSISTANCE
[facility] provides services at a reduced cost to those who are determined to be eligible through [facility]’s financial assistance program. Information regarding [facility]’s financial assistance program can be found by going to [email or web address] or by calling [phone].

## 2025-04-02 NOTE — BRIEF OPERATIVE NOTE - ELECTIVE PROCEDURE
Patient has no belongings upon admission, EMS notified staff that Patient's sister will bring property at a later time  Yes

## 2025-04-02 NOTE — ASU DISCHARGE PLAN (ADULT/PEDIATRIC) - PROVIDER TOKENS
FREE:[LAST:[LUIS],FIRST:[ANGLE],PHONE:[phone]],FAX:[(   )    -],ADDRESS:[[address] [place] [address]]]

## 2025-04-11 ENCOUNTER — APPOINTMENT (OUTPATIENT)
Facility: CLINIC | Age: 75
End: 2025-04-11
Payer: COMMERCIAL

## 2025-04-11 PROBLEM — Z86.73 PERSONAL HISTORY OF TRANSIENT ISCHEMIC ATTACK (TIA), AND CEREBRAL INFARCTION WITHOUT RESIDUAL DEFICITS: Chronic | Status: ACTIVE | Noted: 2025-03-17

## 2025-04-11 PROCEDURE — 99024 POSTOP FOLLOW-UP VISIT: CPT

## 2025-04-14 LAB — SURGICAL PATHOLOGY STUDY: SIGNIFICANT CHANGE UP

## 2025-04-16 ENCOUNTER — NON-APPOINTMENT (OUTPATIENT)
Age: 75
End: 2025-04-16

## 2025-04-29 ENCOUNTER — NON-APPOINTMENT (OUTPATIENT)
Age: 75
End: 2025-04-29

## 2025-04-29 ENCOUNTER — APPOINTMENT (OUTPATIENT)
Dept: RADIATION ONCOLOGY | Facility: CLINIC | Age: 75
End: 2025-04-29

## 2025-04-29 ENCOUNTER — APPOINTMENT (OUTPATIENT)
Dept: ELECTROPHYSIOLOGY | Facility: CLINIC | Age: 75
End: 2025-04-29
Payer: COMMERCIAL

## 2025-04-29 VITALS
SYSTOLIC BLOOD PRESSURE: 95 MMHG | BODY MASS INDEX: 23.47 KG/M2 | DIASTOLIC BLOOD PRESSURE: 60 MMHG | RESPIRATION RATE: 16 BRPM | WEIGHT: 163.91 LBS | HEIGHT: 70 IN | HEART RATE: 53 BPM | OXYGEN SATURATION: 98 %

## 2025-04-29 DIAGNOSIS — C43.4 MALIGNANT MELANOMA OF SCALP AND NECK: ICD-10-CM

## 2025-04-29 PROCEDURE — 93298 REM INTERROG DEV EVAL SCRMS: CPT

## 2025-04-29 PROCEDURE — 99205 OFFICE O/P NEW HI 60 MIN: CPT | Mod: GC

## 2025-05-01 ENCOUNTER — NON-APPOINTMENT (OUTPATIENT)
Age: 75
End: 2025-05-01

## 2025-05-07 NOTE — DISCUSSION/SUMMARY
LEFT VM AND SENT PATIENT A MESSAGE THROUGH PORTAL.    [FreeTextEntry1] : 72 year old man presenting with a cryptogenic stroke 9/2022.  He has a PFO on TTE.  Workup for occult AF is planned.  Providing this is negative, I would recommend PFO closure.  The rationale for the procedure was discussed with patient and his wife.  The risks, such as MI or stroke are similar to any cardiac procedure.  Prior to a PFO closure, he will need a KIRTI to better visualize the defect.  the patient and his wife are aware and wish to proceed.

## 2025-05-09 ENCOUNTER — APPOINTMENT (OUTPATIENT)
Facility: CLINIC | Age: 75
End: 2025-05-09
Payer: COMMERCIAL

## 2025-05-09 PROCEDURE — 99024 POSTOP FOLLOW-UP VISIT: CPT

## 2025-05-20 ENCOUNTER — APPOINTMENT (OUTPATIENT)
Facility: CLINIC | Age: 75
End: 2025-05-20
Payer: COMMERCIAL

## 2025-05-20 VITALS
WEIGHT: 163 LBS | HEIGHT: 70 IN | BODY MASS INDEX: 23.34 KG/M2 | HEART RATE: 51 BPM | SYSTOLIC BLOOD PRESSURE: 124 MMHG | DIASTOLIC BLOOD PRESSURE: 72 MMHG

## 2025-05-20 PROCEDURE — 99024 POSTOP FOLLOW-UP VISIT: CPT

## 2025-05-21 ENCOUNTER — NON-APPOINTMENT (OUTPATIENT)
Age: 75
End: 2025-05-21

## 2025-05-21 VITALS
WEIGHT: 164.13 LBS | DIASTOLIC BLOOD PRESSURE: 68 MMHG | BODY MASS INDEX: 23.5 KG/M2 | HEIGHT: 70 IN | HEART RATE: 51 BPM | RESPIRATION RATE: 16 BRPM | OXYGEN SATURATION: 99 % | TEMPERATURE: 96.5 F | SYSTOLIC BLOOD PRESSURE: 108 MMHG

## 2025-05-21 PROBLEM — Z51.0 ENCOUNTER FOR ANTINEOPLASTIC RADIATION THERAPY: Status: ACTIVE | Noted: 2025-05-21

## 2025-05-22 ENCOUNTER — APPOINTMENT (OUTPATIENT)
Dept: UROLOGY | Facility: CLINIC | Age: 75
End: 2025-05-22
Payer: COMMERCIAL

## 2025-05-22 VITALS
HEART RATE: 59 BPM | BODY MASS INDEX: 31.8 KG/M2 | HEIGHT: 60 IN | TEMPERATURE: 97.6 F | DIASTOLIC BLOOD PRESSURE: 66 MMHG | RESPIRATION RATE: 17 BRPM | SYSTOLIC BLOOD PRESSURE: 110 MMHG | WEIGHT: 162 LBS

## 2025-05-22 DIAGNOSIS — C61 MALIGNANT NEOPLASM OF PROSTATE: ICD-10-CM

## 2025-05-22 DIAGNOSIS — R97.20 ELEVATED PROSTATE, SPECIFIC ANTIGEN [PSA]: ICD-10-CM

## 2025-05-22 PROCEDURE — 99204 OFFICE O/P NEW MOD 45 MIN: CPT

## 2025-05-27 LAB
PSA FREE FLD-MCNC: 6 %
PSA FREE SERPL-MCNC: 0.03 NG/ML
PSA SERPL-MCNC: 0.43 NG/ML

## 2025-05-28 ENCOUNTER — NON-APPOINTMENT (OUTPATIENT)
Age: 75
End: 2025-05-28

## 2025-05-28 VITALS
SYSTOLIC BLOOD PRESSURE: 111 MMHG | HEIGHT: 70 IN | BODY MASS INDEX: 23.19 KG/M2 | DIASTOLIC BLOOD PRESSURE: 63 MMHG | OXYGEN SATURATION: 99 % | WEIGHT: 162 LBS | TEMPERATURE: 97.6 F | HEART RATE: 60 BPM | RESPIRATION RATE: 17 BRPM

## 2025-05-28 DIAGNOSIS — C43.4 MALIGNANT MELANOMA OF SCALP AND NECK: ICD-10-CM

## 2025-05-28 DIAGNOSIS — Z51.0 ENCOUNTER FOR ANTINEOPLASTIC RADIATION THERAPY: ICD-10-CM

## 2025-06-03 ENCOUNTER — APPOINTMENT (OUTPATIENT)
Dept: ELECTROPHYSIOLOGY | Facility: CLINIC | Age: 75
End: 2025-06-03
Payer: COMMERCIAL

## 2025-06-03 ENCOUNTER — NON-APPOINTMENT (OUTPATIENT)
Age: 75
End: 2025-06-03

## 2025-06-03 PROCEDURE — 93298 REM INTERROG DEV EVAL SCRMS: CPT

## 2025-06-04 ENCOUNTER — NON-APPOINTMENT (OUTPATIENT)
Age: 75
End: 2025-06-04

## 2025-06-04 VITALS
HEART RATE: 57 BPM | BODY MASS INDEX: 23.38 KG/M2 | TEMPERATURE: 96.6 F | HEIGHT: 70 IN | SYSTOLIC BLOOD PRESSURE: 120 MMHG | DIASTOLIC BLOOD PRESSURE: 73 MMHG | RESPIRATION RATE: 17 BRPM | OXYGEN SATURATION: 98 % | WEIGHT: 163.34 LBS

## 2025-06-04 RX ORDER — HYDROCORTISONE 25 MG/G
2.5 CREAM TOPICAL
Qty: 1 | Refills: 2 | Status: ACTIVE | COMMUNITY
Start: 2025-06-04 | End: 1900-01-01

## 2025-06-11 ENCOUNTER — NON-APPOINTMENT (OUTPATIENT)
Age: 75
End: 2025-06-11

## 2025-07-08 ENCOUNTER — APPOINTMENT (OUTPATIENT)
Dept: ELECTROPHYSIOLOGY | Facility: CLINIC | Age: 75
End: 2025-07-08
Payer: COMMERCIAL

## 2025-07-08 ENCOUNTER — APPOINTMENT (OUTPATIENT)
Dept: CARDIOLOGY | Facility: CLINIC | Age: 75
End: 2025-07-08

## 2025-07-08 ENCOUNTER — NON-APPOINTMENT (OUTPATIENT)
Age: 75
End: 2025-07-08

## 2025-07-08 VITALS — OXYGEN SATURATION: 96 % | HEART RATE: 54 BPM | DIASTOLIC BLOOD PRESSURE: 75 MMHG | SYSTOLIC BLOOD PRESSURE: 116 MMHG

## 2025-07-08 VITALS — BODY MASS INDEX: 23.24 KG/M2 | WEIGHT: 162 LBS

## 2025-07-08 PROCEDURE — 93000 ELECTROCARDIOGRAM COMPLETE: CPT

## 2025-07-08 PROCEDURE — 99214 OFFICE O/P EST MOD 30 MIN: CPT

## 2025-07-08 PROCEDURE — 93298 REM INTERROG DEV EVAL SCRMS: CPT

## 2025-07-08 PROCEDURE — G2211 COMPLEX E/M VISIT ADD ON: CPT | Mod: NC

## 2025-07-09 LAB
25(OH)D3 SERPL-MCNC: 70.9 NG/ML
ALBUMIN SERPL ELPH-MCNC: 4.1 G/DL
ALP BLD-CCNC: 100 U/L
ALT SERPL-CCNC: 17 U/L
ANION GAP SERPL CALC-SCNC: 14 MMOL/L
AST SERPL-CCNC: 21 U/L
BASOPHILS # BLD AUTO: 0.03 K/UL
BASOPHILS NFR BLD AUTO: 0.4 %
BILIRUB SERPL-MCNC: 0.2 MG/DL
BUN SERPL-MCNC: 29 MG/DL
CALCIUM SERPL-MCNC: 9.3 MG/DL
CHLORIDE SERPL-SCNC: 107 MMOL/L
CHOLEST SERPL-MCNC: 147 MG/DL
CO2 SERPL-SCNC: 19 MMOL/L
CREAT SERPL-MCNC: 1.18 MG/DL
EGFRCR SERPLBLD CKD-EPI 2021: 64 ML/MIN/1.73M2
EOSINOPHIL # BLD AUTO: 0.13 K/UL
EOSINOPHIL NFR BLD AUTO: 1.7 %
ESTIMATED AVERAGE GLUCOSE: 120 MG/DL
GLUCOSE SERPL-MCNC: 55 MG/DL
HBA1C MFR BLD HPLC: 5.8 %
HCT VFR BLD CALC: 44 %
HDLC SERPL-MCNC: 46 MG/DL
HGB BLD-MCNC: 14.6 G/DL
IMM GRANULOCYTES NFR BLD AUTO: 0.3 %
LDLC SERPL-MCNC: 86 MG/DL
LYMPHOCYTES # BLD AUTO: 1.7 K/UL
LYMPHOCYTES NFR BLD AUTO: 21.6 %
MAN DIFF?: NORMAL
MCHC RBC-ENTMCNC: 33.2 G/DL
MCHC RBC-ENTMCNC: 33.6 PG
MCV RBC AUTO: 101.1 FL
MONOCYTES # BLD AUTO: 0.62 K/UL
MONOCYTES NFR BLD AUTO: 7.9 %
NEUTROPHILS # BLD AUTO: 5.36 K/UL
NEUTROPHILS NFR BLD AUTO: 68.1 %
NONHDLC SERPL-MCNC: 101 MG/DL
PLATELET # BLD AUTO: 214 K/UL
POTASSIUM SERPL-SCNC: 4.9 MMOL/L
PROT SERPL-MCNC: 6.3 G/DL
RBC # BLD: 4.35 M/UL
RBC # FLD: 13.2 %
SODIUM SERPL-SCNC: 140 MMOL/L
T4 SERPL-MCNC: 6.7 UG/DL
TRIGL SERPL-MCNC: 75 MG/DL
TSH SERPL-ACNC: 5.51 UIU/ML
WBC # FLD AUTO: 7.86 K/UL

## 2025-07-11 ENCOUNTER — APPOINTMENT (OUTPATIENT)
Facility: CLINIC | Age: 75
End: 2025-07-11
Payer: COMMERCIAL

## 2025-07-11 VITALS
OXYGEN SATURATION: 98 % | TEMPERATURE: 97.7 F | BODY MASS INDEX: 23.34 KG/M2 | HEIGHT: 70 IN | WEIGHT: 163 LBS | HEART RATE: 55 BPM | SYSTOLIC BLOOD PRESSURE: 118 MMHG | DIASTOLIC BLOOD PRESSURE: 72 MMHG | RESPIRATION RATE: 16 BRPM

## 2025-07-11 PROBLEM — Z92.3 S/P RADIATION THERAPY: Status: RESOLVED | Noted: 2025-07-11 | Resolved: 2025-07-11

## 2025-07-11 PROCEDURE — 99214 OFFICE O/P EST MOD 30 MIN: CPT

## 2025-07-18 ENCOUNTER — NON-APPOINTMENT (OUTPATIENT)
Age: 75
End: 2025-07-18

## 2025-07-18 ENCOUNTER — APPOINTMENT (OUTPATIENT)
Dept: CARDIOLOGY | Facility: CLINIC | Age: 75
End: 2025-07-18

## 2025-07-18 PROBLEM — R07.2 SUBSTERNAL CHEST PAIN: Status: ACTIVE | Noted: 2025-07-18

## 2025-07-18 PROCEDURE — 93000 ELECTROCARDIOGRAM COMPLETE: CPT

## 2025-07-21 ENCOUNTER — NON-APPOINTMENT (OUTPATIENT)
Age: 75
End: 2025-07-21

## 2025-07-21 ENCOUNTER — APPOINTMENT (OUTPATIENT)
Dept: CARDIOLOGY | Facility: CLINIC | Age: 75
End: 2025-07-21
Payer: COMMERCIAL

## 2025-07-21 ENCOUNTER — TRANSCRIPTION ENCOUNTER (OUTPATIENT)
Age: 75
End: 2025-07-21

## 2025-07-21 PROCEDURE — 93306 TTE W/DOPPLER COMPLETE: CPT

## 2025-07-22 ENCOUNTER — APPOINTMENT (OUTPATIENT)
Dept: CARDIOLOGY | Facility: CLINIC | Age: 75
End: 2025-07-22

## 2025-07-23 ENCOUNTER — APPOINTMENT (OUTPATIENT)
Dept: NUCLEAR MEDICINE | Facility: IMAGING CENTER | Age: 75
End: 2025-07-23

## 2025-07-24 ENCOUNTER — APPOINTMENT (OUTPATIENT)
Dept: RADIATION ONCOLOGY | Facility: CLINIC | Age: 75
End: 2025-07-24
Payer: COMMERCIAL

## 2025-07-24 VITALS
HEIGHT: 70 IN | BODY MASS INDEX: 23.23 KG/M2 | SYSTOLIC BLOOD PRESSURE: 113 MMHG | WEIGHT: 162.26 LBS | HEART RATE: 57 BPM | TEMPERATURE: 97.3 F | DIASTOLIC BLOOD PRESSURE: 62 MMHG | OXYGEN SATURATION: 100 % | RESPIRATION RATE: 16 BRPM

## 2025-07-24 PROCEDURE — 99024 POSTOP FOLLOW-UP VISIT: CPT

## 2025-07-24 RX ORDER — EMOLLIENT COMBINATION NO.10
EMULSION (GRAM) TOPICAL
Qty: 0 | Refills: 0 | Status: COMPLETED | OUTPATIENT
Start: 2025-07-24

## 2025-07-24 RX ADMIN — Medication 0: at 00:00

## 2025-07-28 ENCOUNTER — LABORATORY RESULT (OUTPATIENT)
Age: 75
End: 2025-07-28

## 2025-07-30 ENCOUNTER — APPOINTMENT (OUTPATIENT)
Dept: CARDIOLOGY | Facility: CLINIC | Age: 75
End: 2025-07-30
Payer: COMMERCIAL

## 2025-07-30 PROCEDURE — 78452 HT MUSCLE IMAGE SPECT MULT: CPT

## 2025-07-30 PROCEDURE — 93015 CV STRESS TEST SUPVJ I&R: CPT

## 2025-07-30 PROCEDURE — A9500: CPT

## 2025-08-04 ENCOUNTER — APPOINTMENT (OUTPATIENT)
Facility: CLINIC | Age: 75
End: 2025-08-04
Payer: COMMERCIAL

## 2025-08-04 VITALS
SYSTOLIC BLOOD PRESSURE: 119 MMHG | DIASTOLIC BLOOD PRESSURE: 73 MMHG | BODY MASS INDEX: 23.19 KG/M2 | HEIGHT: 70 IN | TEMPERATURE: 97.9 F | HEART RATE: 52 BPM | WEIGHT: 162 LBS

## 2025-08-04 DIAGNOSIS — C44.91 BASAL CELL CARCINOMA OF SKIN, UNSPECIFIED: ICD-10-CM

## 2025-08-04 PROCEDURE — 14000 TIS TRNFR TRUNK 10 SQ CM/<: CPT

## 2025-08-11 ENCOUNTER — APPOINTMENT (OUTPATIENT)
Facility: CLINIC | Age: 75
End: 2025-08-11
Payer: COMMERCIAL

## 2025-08-11 LAB — CORE LAB BIOPSY: NORMAL

## 2025-08-11 PROCEDURE — 99024 POSTOP FOLLOW-UP VISIT: CPT

## 2025-08-12 ENCOUNTER — NON-APPOINTMENT (OUTPATIENT)
Age: 75
End: 2025-08-12

## 2025-08-12 ENCOUNTER — APPOINTMENT (OUTPATIENT)
Dept: ELECTROPHYSIOLOGY | Facility: CLINIC | Age: 75
End: 2025-08-12
Payer: COMMERCIAL

## 2025-08-12 PROCEDURE — 93298 REM INTERROG DEV EVAL SCRMS: CPT

## 2025-08-14 ENCOUNTER — RX RENEWAL (OUTPATIENT)
Age: 75
End: 2025-08-14

## 2025-08-25 ENCOUNTER — APPOINTMENT (OUTPATIENT)
Dept: NUCLEAR MEDICINE | Facility: IMAGING CENTER | Age: 75
End: 2025-08-25
Payer: COMMERCIAL

## 2025-08-25 ENCOUNTER — OUTPATIENT (OUTPATIENT)
Dept: OUTPATIENT SERVICES | Facility: HOSPITAL | Age: 75
LOS: 1 days | End: 2025-08-25
Payer: COMMERCIAL

## 2025-08-25 DIAGNOSIS — Z85.46 PERSONAL HISTORY OF MALIGNANT NEOPLASM OF PROSTATE: ICD-10-CM

## 2025-08-25 DIAGNOSIS — R97.20 ELEVATED PROSTATE SPECIFIC ANTIGEN [PSA]: ICD-10-CM

## 2025-08-25 DIAGNOSIS — Z87.74 PERSONAL HISTORY OF (CORRECTED) CONGENITAL MALFORMATIONS OF HEART AND CIRCULATORY SYSTEM: Chronic | ICD-10-CM

## 2025-08-25 DIAGNOSIS — C61 MALIGNANT NEOPLASM OF PROSTATE: ICD-10-CM

## 2025-08-25 DIAGNOSIS — Z98.890 OTHER SPECIFIED POSTPROCEDURAL STATES: Chronic | ICD-10-CM

## 2025-08-25 DIAGNOSIS — C43.4 MALIGNANT MELANOMA OF SCALP AND NECK: ICD-10-CM

## 2025-08-25 DIAGNOSIS — Z90.79 ACQUIRED ABSENCE OF OTHER GENITAL ORGAN(S): Chronic | ICD-10-CM

## 2025-08-25 PROCEDURE — 78816 PET IMAGE W/CT FULL BODY: CPT | Mod: 26,PS

## 2025-08-25 PROCEDURE — A9552: CPT

## 2025-08-25 PROCEDURE — 78816 PET IMAGE W/CT FULL BODY: CPT

## 2025-08-28 ENCOUNTER — APPOINTMENT (OUTPATIENT)
Dept: INTERVENTIONAL RADIOLOGY/VASCULAR | Facility: CLINIC | Age: 75
End: 2025-08-28

## 2025-08-28 DIAGNOSIS — R91.1 SOLITARY PULMONARY NODULE: ICD-10-CM

## 2025-08-28 DIAGNOSIS — I63.9 CEREBRAL INFARCTION, UNSPECIFIED: ICD-10-CM

## 2025-08-28 PROCEDURE — 99203 OFFICE O/P NEW LOW 30 MIN: CPT | Mod: 3W

## 2025-09-05 ENCOUNTER — NON-APPOINTMENT (OUTPATIENT)
Age: 75
End: 2025-09-05

## 2025-09-05 ENCOUNTER — APPOINTMENT (OUTPATIENT)
Facility: CLINIC | Age: 75
End: 2025-09-05
Payer: COMMERCIAL

## 2025-09-05 VITALS
TEMPERATURE: 97.8 F | DIASTOLIC BLOOD PRESSURE: 69 MMHG | HEART RATE: 50 BPM | HEIGHT: 70 IN | OXYGEN SATURATION: 98 % | SYSTOLIC BLOOD PRESSURE: 117 MMHG | BODY MASS INDEX: 23.34 KG/M2 | WEIGHT: 163 LBS | RESPIRATION RATE: 16 BRPM

## 2025-09-05 PROCEDURE — 99214 OFFICE O/P EST MOD 30 MIN: CPT | Mod: 24

## 2025-09-08 ENCOUNTER — APPOINTMENT (OUTPATIENT)
Dept: RADIATION ONCOLOGY | Facility: CLINIC | Age: 75
End: 2025-09-08
Payer: COMMERCIAL

## 2025-09-08 VITALS
SYSTOLIC BLOOD PRESSURE: 117 MMHG | BODY MASS INDEX: 23.71 KG/M2 | DIASTOLIC BLOOD PRESSURE: 76 MMHG | OXYGEN SATURATION: 96 % | TEMPERATURE: 96.98 F | RESPIRATION RATE: 16 BRPM | WEIGHT: 165.24 LBS | HEART RATE: 50 BPM

## 2025-09-08 PROCEDURE — 99215 OFFICE O/P EST HI 40 MIN: CPT

## 2025-09-10 ENCOUNTER — APPOINTMENT (OUTPATIENT)
Dept: DERMATOLOGY | Facility: CLINIC | Age: 75
End: 2025-09-10

## 2025-09-16 ENCOUNTER — NON-APPOINTMENT (OUTPATIENT)
Age: 75
End: 2025-09-16

## 2025-09-16 ENCOUNTER — APPOINTMENT (OUTPATIENT)
Dept: ELECTROPHYSIOLOGY | Facility: CLINIC | Age: 75
End: 2025-09-16
Payer: COMMERCIAL

## 2025-09-16 PROCEDURE — 93298 REM INTERROG DEV EVAL SCRMS: CPT

## (undated) DEVICE — DRAPE 3/4 SHEET 52X76"

## (undated) DEVICE — DRAPE WARMING SOLUTION 44 X 44"

## (undated) DEVICE — SOL IRR POUR H2O 1500ML

## (undated) DEVICE — DRAPE SPLIT SHEET 77" X 108"

## (undated) DEVICE — DRAPE MAGNETIC INSTRUMENT MEDIUM

## (undated) DEVICE — ELCTR BOVIE PENCIL SMOKE EVACUATION

## (undated) DEVICE — SUT SILK 3-0 18" TIES

## (undated) DEVICE — SUT SILK 2-0 24" TIES

## (undated) DEVICE — DRSG CURITY GAUZE SPONGE 4 X 4" 12-PLY

## (undated) DEVICE — VENODYNE/SCD SLEEVE CALF MEDIUM

## (undated) DEVICE — GLV 7 PROTEXIS (WHITE)

## (undated) DEVICE — PACK HEAD & NECK

## (undated) DEVICE — SUT ETHILON 6-0 18" PS-3

## (undated) DEVICE — SUT VICRYL 4-0 27" RB-1 UNDYED

## (undated) DEVICE — PROTECTOR HEEL / ELBOW FLUFFY

## (undated) DEVICE — NDL HYPO SAFE 22G X 1" (BLACK)

## (undated) DEVICE — DRAPE INSTRUMENT POUCH 6.75" X 11"

## (undated) DEVICE — STAPLER SKIN VISI-STAT 35 WIDE

## (undated) DEVICE — DRAPE TOWEL BLUE 17" X 24"

## (undated) DEVICE — ELCTR GROUNDING PAD ADULT COVIDIEN

## (undated) DEVICE — SUT VICRYL PLUS 2-0 18" TIES UNDYED

## (undated) DEVICE — SUT SILK 2-0 18" FS

## (undated) DEVICE — SYR LUER LOK 5CC

## (undated) DEVICE — CANISTER DISPOSABLE THIN WALL 3000CC

## (undated) DEVICE — NERVE STIMULATOR/LOCATOR HEAD AND NECK MODEL

## (undated) DEVICE — LABELS BLANK W PEN

## (undated) DEVICE — DRSG STERISTRIPS 0.25 X 4"

## (undated) DEVICE — DRSG BENZOIN 0.6CC

## (undated) DEVICE — ELCTR BOVIE TIP BLADE INSULATED 2.75" EDGE

## (undated) DEVICE — PREP BETADINE KIT

## (undated) DEVICE — SUT SILK 2-0 30" SH

## (undated) DEVICE — WARMING BLANKET LOWER ADULT